# Patient Record
Sex: FEMALE | Race: WHITE | NOT HISPANIC OR LATINO | Employment: FULL TIME | URBAN - METROPOLITAN AREA
[De-identification: names, ages, dates, MRNs, and addresses within clinical notes are randomized per-mention and may not be internally consistent; named-entity substitution may affect disease eponyms.]

---

## 2024-07-15 ENCOUNTER — OFFICE VISIT (OUTPATIENT)
Dept: OBSTETRICS AND GYNECOLOGY | Facility: CLINIC | Age: 44
End: 2024-07-15
Attending: OBSTETRICS & GYNECOLOGY
Payer: OTHER GOVERNMENT

## 2024-07-15 DIAGNOSIS — N94.6 DYSMENORRHEA: Primary | ICD-10-CM

## 2024-07-15 PROCEDURE — 99213 OFFICE O/P EST LOW 20 MIN: CPT | Mod: 95,,, | Performed by: OBSTETRICS & GYNECOLOGY

## 2024-07-15 RX ORDER — IBUPROFEN 800 MG/1
800 TABLET ORAL 2 TIMES DAILY WITH MEALS
Qty: 30 TABLET | Refills: 2 | Status: SHIPPED | OUTPATIENT
Start: 2024-07-15 | End: 2024-10-16 | Stop reason: SDUPTHER

## 2024-07-15 RX ORDER — PROGESTERONE 200 MG/1
200 CAPSULE ORAL NIGHTLY
Qty: 15 CAPSULE | Refills: 11 | Status: ON HOLD | OUTPATIENT
Start: 2024-07-15 | End: 2024-10-23 | Stop reason: HOSPADM

## 2024-07-15 NOTE — PROGRESS NOTES
The patient location is: home  The chief complaint leading to consultation is: painful menses    Visit type: audiovisual    Face to Face time with patient: 20   total minutes of total time spent on the encounter, which includes face to face time and non-face to face time preparing to see the patient (eg, review of tests), Obtaining and/or reviewing separately obtained history, Documenting clinical information in the electronic or other health record, Independently interpreting results (not separately reported) and communicating results to the patient/family/caregiver, or Care coordination (not separately reported).         Each patient to whom he or she provides medical services by telemedicine is:  (1) informed of the relationship between the physician and patient and the respective role of any other health care provider with respect to management of the patient; and (2) notified that he or she may decline to receive medical services by telemedicine and may withdraw from such care at any time.    Notes:     CC:painful menses     HPI:  Pain last 2 weeks of cycle.  Taking nexium and famotidine.  Essentially taking ibuprofen x 2 weeks of the month daily.   History of associated diarrhea, usually resolves after cramping resolves.     ROS:  GENERAL: Feeling well overall. Denies fever or chills.   SKIN: Denies rash or lesions.   HEAD: Denies head injury or headache.   NODES: Denies enlarged lymph nodes.   CHEST: Denies chest pain or shortness of breath.   CARDIOVASCULAR: Denies palpitations or left sided chest pain.   ABDOMEN: No abdominal pain, constipation, diarrhea, nausea, vomiting or rectal bleeding.   URINARY: No dysuria, hematuria, or burning on urination.  REPRODUCTIVE: See HPI.   BREASTS: Denies pain, lumps, or nipple discharge.   HEMATOLOGIC: No easy bruisability or excessive bleeding.   MUSCULOSKELETAL: Denies joint pain or swelling.   NEUROLOGIC: Denies syncope or weakness.   PSYCHIATRIC: Denies depression,  anxiety or mood swings.    PE:   APPEARANCE: Well nourished, well developed, White female in no acute distress.      Diagnosis:  1. Dysmenorrhea        Plan:     Orders Placed This Encounter    progesterone (PROMETRIUM) 200 MG capsule    ibuprofen (ADVIL,MOTRIN) 800 MG tablet           Rima Vann DO

## 2024-10-04 ENCOUNTER — OFFICE VISIT (OUTPATIENT)
Dept: OBSTETRICS AND GYNECOLOGY | Facility: CLINIC | Age: 44
End: 2024-10-04
Attending: OBSTETRICS & GYNECOLOGY
Payer: OTHER GOVERNMENT

## 2024-10-04 VITALS
BODY MASS INDEX: 39.57 KG/M2 | DIASTOLIC BLOOD PRESSURE: 77 MMHG | WEIGHT: 237.81 LBS | SYSTOLIC BLOOD PRESSURE: 112 MMHG

## 2024-10-04 DIAGNOSIS — G47.33 OBSTRUCTIVE SLEEP APNEA OF ADULT: ICD-10-CM

## 2024-10-04 DIAGNOSIS — N94.6 DYSMENORRHEA: Primary | ICD-10-CM

## 2024-10-04 DIAGNOSIS — I10 HYPERTENSION, UNSPECIFIED TYPE: ICD-10-CM

## 2024-10-04 DIAGNOSIS — K21.9 GASTROESOPHAGEAL REFLUX DISEASE, UNSPECIFIED WHETHER ESOPHAGITIS PRESENT: ICD-10-CM

## 2024-10-04 PROCEDURE — 99214 OFFICE O/P EST MOD 30 MIN: CPT | Mod: PBBFAC,PO | Performed by: OBSTETRICS & GYNECOLOGY

## 2024-10-04 PROCEDURE — 99214 OFFICE O/P EST MOD 30 MIN: CPT | Mod: S$PBB,,, | Performed by: OBSTETRICS & GYNECOLOGY

## 2024-10-04 PROCEDURE — 99999 PR PBB SHADOW E&M-EST. PATIENT-LVL IV: CPT | Mod: PBBFAC,,, | Performed by: OBSTETRICS & GYNECOLOGY

## 2024-10-09 ENCOUNTER — PATIENT MESSAGE (OUTPATIENT)
Dept: PSYCHIATRY | Facility: CLINIC | Age: 44
End: 2024-10-09
Payer: OTHER GOVERNMENT

## 2024-10-10 ENCOUNTER — PATIENT MESSAGE (OUTPATIENT)
Dept: SLEEP MEDICINE | Facility: CLINIC | Age: 44
End: 2024-10-10
Payer: OTHER GOVERNMENT

## 2024-10-14 ENCOUNTER — HOSPITAL ENCOUNTER (OUTPATIENT)
Dept: PREADMISSION TESTING | Facility: OTHER | Age: 44
Discharge: HOME | End: 2024-10-14
Attending: OBSTETRICS & GYNECOLOGY
Payer: OTHER GOVERNMENT

## 2024-10-14 ENCOUNTER — ANESTHESIA EVENT (OUTPATIENT)
Dept: SURGERY | Facility: OTHER | Age: 44
End: 2024-10-14
Payer: OTHER GOVERNMENT

## 2024-10-14 VITALS
WEIGHT: 237 LBS | HEART RATE: 80 BPM | BODY MASS INDEX: 39.49 KG/M2 | RESPIRATION RATE: 16 BRPM | SYSTOLIC BLOOD PRESSURE: 117 MMHG | DIASTOLIC BLOOD PRESSURE: 55 MMHG | TEMPERATURE: 98 F | OXYGEN SATURATION: 97 % | HEIGHT: 65 IN

## 2024-10-14 DIAGNOSIS — N94.6 DYSMENORRHEA: ICD-10-CM

## 2024-10-14 LAB
ABO + RH BLD: NORMAL
BLD GP AB SCN CELLS X3 SERPL QL: NORMAL
SPECIMEN OUTDATE: NORMAL

## 2024-10-14 PROCEDURE — 86900 BLOOD TYPING SEROLOGIC ABO: CPT | Performed by: OBSTETRICS & GYNECOLOGY

## 2024-10-14 PROCEDURE — 86901 BLOOD TYPING SEROLOGIC RH(D): CPT | Performed by: OBSTETRICS & GYNECOLOGY

## 2024-10-14 RX ORDER — LIDOCAINE HYDROCHLORIDE 10 MG/ML
0.5 INJECTION, SOLUTION EPIDURAL; INFILTRATION; INTRACAUDAL; PERINEURAL ONCE
OUTPATIENT
Start: 2024-10-14 | End: 2024-10-14

## 2024-10-14 RX ORDER — SODIUM CHLORIDE, SODIUM LACTATE, POTASSIUM CHLORIDE, CALCIUM CHLORIDE 600; 310; 30; 20 MG/100ML; MG/100ML; MG/100ML; MG/100ML
INJECTION, SOLUTION INTRAVENOUS CONTINUOUS
OUTPATIENT
Start: 2024-10-14

## 2024-10-14 NOTE — ANESTHESIA PREPROCEDURE EVALUATION
10/14/2024  Shayna Melchor is a 44 y.o., female.      Pre-op Assessment    I have reviewed the Patient Summary Reports.     I have reviewed the Nursing Notes. I have reviewed the NPO Status.   I have reviewed the Medications.     Review of Systems  Anesthesia Hx:  No previous Anesthesia   History of prior surgery of interest to airway management or planning:          Denies Family Hx of Anesthesia complications.    Denies Personal Hx of Anesthesia complications.                    Social:  Non-Smoker       Hematology/Oncology:  Hematology Normal   Oncology Normal                                   EENT/Dental:  EENT/Dental Normal           Cardiovascular:     Hypertension, well controlled           hyperlipidemia   ECG has been reviewed. Recent stress test and echo WNL in epic                           Pulmonary:    Asthma mild   Sleep Apnea, CPAP                Renal/:  Renal/ Normal                 Hepatic/GI:     GERD                Musculoskeletal:  Arthritis               Neurological:      Headaches                                 Endocrine:  Endocrine Normal          Morbid Obesity / BMI > 40  Dermatological:  Skin Normal    Psych:  Psychiatric History  depression ADHD,PTSD               Physical Exam  General: Cooperative, Alert and Oriented    Airway:  Mallampati: II   Mouth Opening: Normal  Neck ROM: Normal ROM    Dental:  Intact    Anesthesia Plan  Type of Anesthesia, risks & benefits discussed:    Anesthesia Type: Gen ETT  Intra-op Monitoring Plan: Standard ASA Monitors  Post Op Pain Control Plan: multimodal analgesia  Induction:  IV  Airway Plan: Video  Informed Consent: Informed consent signed with the Patient and all parties understand the risks and agree with anesthesia plan.  All questions answered.   ASA Score: 3  Anesthesia Plan Notes: Labs in epic,T and s today    Ready For  Surgery From Anesthesia Perspective.     .

## 2024-10-14 NOTE — DISCHARGE INSTRUCTIONS
Information to Prepare you for your Surgery    PRE-ADMIT TESTING   [address]  [place] [address]  [address]   [phone]  - ORI    Your surgery has been scheduled at [facility]. We are pleased to have the opportunity to serve you. For Further Information please call [phone].    On the day of surgery please report to Registration on the 1st floor of the [facility].    CONTACT YOUR PHYSICIAN'S OFFICE THE DAY PRIOR TO YOUR SURGERY TO OBTAIN YOUR ARRIVAL TIME.     The evening before surgery do not eat anything after 9 p.m. ( this includes hard candy, chewing gum and mints).  You may only have GATORADE, POWERADE AND WATER  from 9 p.m. until you leave your home.   *DRINK AT LEAST 12 OUNCES THE MORNING OF SURGERY    DO NOT DRINK ANY LIQUIDS ON THE WAY TO THE HOSPITAL.      Why does your anesthesiologist allow you to drink Gatorade/Powerade before surgery?  Gatorade/Powerade helps to increase your comfort before surgery and to decrease your nausea after surgery.   The carbohydrates in Gatorade/Powerade help reduce your body's stress response to surgery.  If you are a diabetic-drink only water prior to surgery.    Outpatient Surgery- May allow 2 adults (18 and older)/ Support Persons (1 being the designated ) for all surgical/procedural patients. A breastfeeding mother will be allowed her infant and 2 adult Support Persons. No one under the age of 18 will be allowed in the building.    MEDICATION INSTRUCTIONS: TAKE medications checked off by the Anesthesiologist on your Medication List.  *Please bring blood pressure medication and diabetes medication in their original bottles the day of surgery.    Angiogram Patients: Take medications as instructed by your physician, including aspirin.     Surgery Patients:  If you take ASPIRIN - Your PHYSICIAN/SURGEON will need to inform you IF/OR when you need to stop taking aspirin prior to your surgery.      Starting the week prior to surgery, do not take any medications containing IBUPROFEN or NSAIDS (Advil, Aleve, BC, Celebrex, Goody's, Ketorolac, Meloxicam, Mobic, Motrin, Naproxen, Toradol, etc).  If you are not sure if you should take a medicine please call your surgeon's office.  You may take Tylenol.    Do Not Wear any make-up (especially eye make-up) to surgery. Please remove any false eyelashes or eyelash extensions. If you arrive the day of surgery with makeup/eyelashes on you will be required to remove prior to surgery. (There is a risk of corneal abrasions if eye makeup/eyelash extensions are not removed)    Leave all valuables at home.   Do Not wear any jewelry or watches, including any metal in body piercings. Jewelry must be removed prior to coming to the hospital.  There is a possibility that rings that are unable to be removed may be cut off if they are on the surgical extremity.    Please remove all hair extensions, wigs, clips and any other metal accessories/ ornaments from your hair.  These items may pose a flammable/fire risk in Surgery and must be removed.    Do not shave your surgical area at least 5 days prior to your surgery. The surgical prep will be performed at the hospital according to Infection Control regulations.    Contact Lens must be removed before surgery. Either do not wear the contact lens or bring a case and solution for storage.  Please bring a container for eyeglasses or dentures as required.  Bring any paperwork your physician has provided, such as consent forms,  history and physicals, doctor's orders, etc.   Bring comfortable clothes that are loose fitting to wear upon discharge. Take into consideration the type of surgery being performed.  Maintain your diet as advised per your physician the day prior to surgery.    Adequate rest the night before surgery is advised.   Park in the Parking lot behind the hospital or in the [employer] [facility] across the street from the  parking lot. Parking is complimentary.  If you will be discharged the same day as your procedure, please arrange for a responsible adult to drive you home or to accompany you if traveling by taxi.   YOU WILL NOT BE PERMITTED TO DRIVE OR TO LEAVE THE HOSPITAL ALONE AFTER SURGERY.   If you are being discharged the same day, it is strongly recommended that you arrange for someone to remain with you for the first 24 hrs following your surgery.    The Surgeon will speak to your family/visitor after your surgery regarding the outcome of your surgery and post op care.  The Surgeon may speak to you after your surgery, but there is a possibility you may not remember the details.  Please check with your family members regarding the conversation with the Surgeon.         Bathing Instructions with Hibiclens:    Shower the evening before and morning of your procedure with Chlorhexidine (Hibiclens)  do not use Chlorhexidine on your face or genitals. Do not get in your eyes.  Wash your face with water and your regular face wash/soap  Use your regular shampoo  Apply Chlorhexidine (Hibiclens) directly on your skin or on a wet washcloth and wash gently. When showering: Move away from the shower stream when applying Chlorhexidine (Hibiclens) to avoid rinsing off too soon.  Rinse thoroughly with warm water  Do not dilute Chlorhexidine (Hibiclens)   Dry off as usual, do not use any deodorant, powder, body lotions, perfume, after shave or cologne.     We strongly recommend whoever is bringing you home be present for discharge instructions.  This will ensure a thorough understanding for your post op home care.      ThanksJackie RN

## 2024-10-16 ENCOUNTER — PATIENT MESSAGE (OUTPATIENT)
Dept: OBSTETRICS AND GYNECOLOGY | Facility: CLINIC | Age: 44
End: 2024-10-16
Payer: OTHER GOVERNMENT

## 2024-10-16 DIAGNOSIS — N94.6 DYSMENORRHEA: ICD-10-CM

## 2024-10-16 RX ORDER — IBUPROFEN 800 MG/1
800 TABLET ORAL 2 TIMES DAILY WITH MEALS
Qty: 30 TABLET | Refills: 2 | Status: SHIPPED | OUTPATIENT
Start: 2024-10-16

## 2024-10-16 RX ORDER — OXYCODONE AND ACETAMINOPHEN 5; 325 MG/1; MG/1
1 TABLET ORAL EVERY 4 HOURS PRN
Qty: 20 TABLET | Refills: 0 | Status: SHIPPED | OUTPATIENT
Start: 2024-10-16

## 2024-10-18 ENCOUNTER — OFFICE VISIT (OUTPATIENT)
Dept: CARDIOLOGY | Facility: CLINIC | Age: 44
End: 2024-10-18
Payer: OTHER GOVERNMENT

## 2024-10-18 VITALS
WEIGHT: 242.31 LBS | HEIGHT: 65 IN | BODY MASS INDEX: 40.37 KG/M2 | SYSTOLIC BLOOD PRESSURE: 123 MMHG | DIASTOLIC BLOOD PRESSURE: 79 MMHG | HEART RATE: 75 BPM

## 2024-10-18 DIAGNOSIS — E66.813 CLASS 3 SEVERE OBESITY DUE TO EXCESS CALORIES WITH SERIOUS COMORBIDITY AND BODY MASS INDEX (BMI) OF 40.0 TO 44.9 IN ADULT: ICD-10-CM

## 2024-10-18 DIAGNOSIS — I10 PRIMARY HYPERTENSION: Primary | ICD-10-CM

## 2024-10-18 DIAGNOSIS — E78.2 MIXED HYPERLIPIDEMIA: ICD-10-CM

## 2024-10-18 DIAGNOSIS — E66.01 CLASS 3 SEVERE OBESITY DUE TO EXCESS CALORIES WITH SERIOUS COMORBIDITY AND BODY MASS INDEX (BMI) OF 40.0 TO 44.9 IN ADULT: ICD-10-CM

## 2024-10-18 PROCEDURE — 99214 OFFICE O/P EST MOD 30 MIN: CPT | Mod: PBBFAC | Performed by: INTERNAL MEDICINE

## 2024-10-18 PROCEDURE — 99999 PR PBB SHADOW E&M-EST. PATIENT-LVL IV: CPT | Mod: PBBFAC,,, | Performed by: INTERNAL MEDICINE

## 2024-10-18 RX ORDER — CARVEDILOL 6.25 MG/1
6.25 TABLET ORAL 2 TIMES DAILY WITH MEALS
Qty: 180 TABLET | Refills: 3 | Status: SHIPPED | OUTPATIENT
Start: 2024-10-18 | End: 2025-10-18

## 2024-10-18 NOTE — PROGRESS NOTES
HISTORY:    43 yo F w a h/o hypertension, hyperlipidemia, EHSAN on CPAP, obesity presenting for for 2nd visit with me.    Pt initially evaluated for DICKSON and elevated heart rates with activity over the previous few months. We adjusted her CV/anti-htnsive regimen with improvement in symptoms.    No CP or DICKSON at this time. Heart rates acceptable now.     Back to exercising at home and walking a lot.     The patient denies any previous history of myocardial infarction, coronary artery disease, peripheral arterial disease, stroke, congestive heart failure, or cardiomyopathy. Non-smoker.     Currently tolerates carvedilol 6.25x2, telmisartan 80x1. Bps controlled on current regimen.     Recently moved here from [place]. From [facility].     PHYSICAL EXAM:    Vitals:    10/18/24 1031   BP: 123/79   Pulse: 75       NAD, A+Ox3.  No jvd, no bruit.  RRR nml s1,s2. No murmurs.  CTA B no wheezes or crackles.  No edema.    LABS/STUDIES (imaging reviewed during clinic visit):    October 2024 CBC and CMP unremarkable.  August BNP and troponin normal.  February /HDL 38//.  A1c and TSH normal.    ECG August 2024 sinus rhythm with no Q-waves or ST changes.  Holter September 2024 sinus rhythm with average heart rate of 88.  Multiple episodes of dizziness or shortness of breath corresponded with sinus rhythm.  TST October 2024 6 minutes and 37 seconds on a high ramp protocol.  No evidence of ischemia.    TTE September 2024 normal LV size and function with EF 60 65%.  Normal diastology.  CVP 3.  Venous ultrasound March 2024 no evidence of DVT bilaterally.  Right GSV reflux present.        ASSESSMENT & PLAN:    1. Primary hypertension    2. Mixed hyperlipidemia    3. Class 3 severe obesity due to excess calories with serious comorbidity and body mass index (BMI) of 40.0 to 44.9 in adult          Orders Placed This Encounter    carvediloL (COREG) 6.25 MG tablet       DICKSON and elevated heart rates resolved.      Bps controlled on  current regimen of carvedilol 6.25x2 and telmisartan 80x1.     Mild venous insufficiency. Symptoms managed with compression stockings.     We discussed the importance of diet modifications and instituting an exercise regimen.     Has hysterectomy scheduled for next week. No CV hx. CV testing all normal of late. No CV contraindication to proceeding. No further CV testing required.     Follow up if symptoms worsen or fail to improve.      Tripp Barrera MD

## 2024-10-22 ENCOUNTER — TELEPHONE (OUTPATIENT)
Dept: OBSTETRICS AND GYNECOLOGY | Facility: CLINIC | Age: 44
End: 2024-10-22
Payer: OTHER GOVERNMENT

## 2024-10-22 PROBLEM — N94.6 DYSMENORRHEA: Status: ACTIVE | Noted: 2024-10-22

## 2024-10-22 NOTE — H&P
[facility])  Gynecology  H&P    Patient Name: Shayna Melchor  MRN: [id number]  Admission Date: (Not on file)  Primary Care Provider: Stefanie Baltazar MD   Principal Problem: Dysmenorrhea    Subjective:     Chief Complaint/Reason for Admission: encounter for hysterectomy    History of Present Illness: Shayna Melchor is a 43 yo patient who presents for hysterectomy due to long standing pelvic pain, painful periods and irregular spotting.  She has not seen improvement with NSAIDS or IUD. She would like to proceed with hysterectomy for definitive management.  HTN well controlled on medications.  Regular exercise with walking. Has received clearance from her PCP to proceed with surgery.      Past Medical History:   Diagnosis Date    ADHD (attention deficit hyperactivity disorder)     Depression     Hyperlipidemia     Hypertension     EHSAN (obstructive sleep apnea)     wears cpap    PTSD (post-traumatic stress disorder)      Past Surgical History:   Procedure Laterality Date     SECTION  2015, 2017    SINUS SURGERY  2009    deviated septum    TUBAL LIGATION  2017     Family History       Problem Relation (Age of Onset)    Arthritis Mother, Paternal Grandmother    Asthma Maternal Grandmother    Breast cancer Paternal Aunt, Paternal Aunt, Paternal Grandfather    Cancer Paternal Aunt, Paternal Grandmother    Depression Mother, Father, Sister, Maternal Grandmother, Paternal Grandmother, Brother    Diabetes Mother    Heart disease Paternal Grandfather    Hyperlipidemia Mother    Hypertension Mother    Learning disabilities Sister, Brother    Mental illness Mother, Father, Sister, Brother    Miscarriages / Stillbirths Paternal Grandmother    Vision loss Mother, Father, Paternal Grandmother          Tobacco Use    Smoking status: Never    Smokeless tobacco: Never   Substance and Sexual Activity    Alcohol use: Not Currently     Alcohol/week: 2.0 standard drinks of alcohol      Types: 2 Glasses of wine per week    Drug use: Never    Sexual activity: Yes     Partners: Male     Birth control/protection: I.U.D., See Surgical Hx       No medications prior to admission.       Review of patient's allergies indicates:   Allergen Reactions    Benadryl allergy-sinus Palpitations     tachycardia    Ciprofloxacin Hives, Shortness Of Breath and Other (See Comments)     hives    Diphenhydramine Shortness Of Breath, Palpitations and Other (See Comments)     Other Reaction(s): Other        Fish containing products Other (See Comments), Anaphylaxis, Hives and Shortness Of Breath     White fish    Any type of fish.    Latex, natural rubber Hives    Nickel Hives and Rash       Review of Systems   Constitutional: Negative.    HENT: Negative.     Eyes: Negative.    Respiratory:  Negative for shortness of breath.    Cardiovascular:         Mild venous insufficiency   Gastrointestinal:  Positive for abdominal pain, constipation and diarrhea.   Endocrine: Negative.    Genitourinary:  Positive for menorrhagia, menstrual problem, pelvic pain and vaginal bleeding.   Musculoskeletal: Negative.    Integumentary:  Negative.   Neurological: Negative.    Hematological: Negative.    Psychiatric/Behavioral:  Positive for depression.    Breast: negative.       Objective:     Vital Signs (Most Recent):    Vital Signs (24h Range):           There is no height or weight on file to calculate BMI.    Physical Exam:   Constitutional: She is oriented to person, place, and time. She appears well-developed and well-nourished.    HENT:   Head: Normocephalic and atraumatic.    Eyes: Pupils are equal, round, and reactive to light. EOM are normal.    Neck: No thyromegaly present.    Cardiovascular:       Exam reveals no clubbing and no cyanosis.       Cleared, normal studies, tachycardia improved.      Pulmonary/Chest: Effort normal.        Abdominal: Soft.     Genitourinary:    Vagina and uterus normal.             Musculoskeletal:  Normal range of motion.       Neurological: She is alert and oriented to person, place, and time.    Skin: Skin is warm and dry. No cyanosis. Nails show no clubbing.    Psychiatric: She has a normal mood and affect. Her behavior is normal. Judgment and thought content normal.       Laboratory:  Lab Results   Component Value Date    WBC 11.02 10/03/2024    HGB 12.0 10/03/2024    HCT 35.4 (L) 10/03/2024    MCV 96 10/03/2024     10/03/2024     CMP  Sodium   Date Value Ref Range Status   10/03/2024 139 136 - 145 mmol/L Final     Potassium   Date Value Ref Range Status   10/03/2024 4.2 3.5 - 5.1 mmol/L Final     Chloride   Date Value Ref Range Status   10/03/2024 107 95 - 110 mmol/L Final     CO2   Date Value Ref Range Status   10/03/2024 23 23 - 29 mmol/L Final     Glucose   Date Value Ref Range Status   10/03/2024 114 (H) 70 - 110 mg/dL Final     BUN   Date Value Ref Range Status   10/03/2024 12 6 - 20 mg/dL Final     Creatinine   Date Value Ref Range Status   10/03/2024 0.8 0.5 - 1.4 mg/dL Final     Calcium   Date Value Ref Range Status   10/03/2024 9.7 8.7 - 10.5 mg/dL Final     Total Protein   Date Value Ref Range Status   10/03/2024 7.3 6.0 - 8.4 g/dL Final     Albumin   Date Value Ref Range Status   10/03/2024 3.6 3.5 - 5.2 g/dL Final     Total Bilirubin   Date Value Ref Range Status   10/03/2024 0.4 0.1 - 1.0 mg/dL Final     Comment:     For infants and newborns, interpretation of results should be based  on gestational age, weight and in agreement with clinical  observations.    Premature Infant recommended reference ranges:  Up to 24 hours.............<8.0 mg/dL  Up to 48 hours............<12.0 mg/dL  3-5 days..................<15.0 mg/dL  6-29 days.................<15.0 mg/dL       Alkaline Phosphatase   Date Value Ref Range Status   10/03/2024 81 55 - 135 U/L Final     AST   Date Value Ref Range Status   10/03/2024 9 (L) 10 - 40 U/L Final     ALT   Date Value Ref Range Status   10/03/2024 28 10 - 44  U/L Final     Anion Gap   Date Value Ref Range Status   10/03/2024 9 8 - 16 mmol/L Final     eGFR   Date Value Ref Range Status   10/03/2024 >60.0 >60 mL/min/1.73 m^2 Final           Diagnostic Results:  Stress EKG:    The ECG portion of the study is negative for ischemia.    The patient reported no chest pain during the stress test.    The blood pressure response to stress was normal.    During stress, occasional PACs are noted.    The patient exercised for 6 minutes 37 seconds on a high ramp protocol, achieving a peak heart rate of 136 bpm, which is 80% of the age predicted maximum heart rate.    Baseline ECG: The Baseline ECG reveals sinus rhythm. There is low voltage.    The ECG portion of the study is negative for ischemia.    Stress ECG: There is no ST segment deviation identified during the protocol.      Pelvic u/s  EXAMINATION:  US PELVIS COMP WITH TRANSVAG NON-OB (XPD)     CLINICAL HISTORY:  Displacement of intrauterine contraceptive device, initial encounter     TECHNIQUE:  Transabdominal sonography of the pelvis was performed, followed by transvaginal sonography to better evaluate the uterus and ovaries.     COMPARISON:  None.     FINDINGS:  Uterus:     Size: 7.9 x 3.7 x 5.4 cm     The parenchyma is homogeneous.  Endometrium is normal caliber measuring 0.6 cm.     Right ovary:     Size: 3.7 x 2.6 x 2.2 cm.  Cyst with a reticular pattern of internal echoes measures 2.2 cm.     Left ovary:     Not visualized     Free Fluid:     None.     Impression:     Intrauterine device in good position in the endometrial canal.     Small right ovarian hemorrhagic cyst or follicle.  No follow-up required in this age group.     Assessment/Plan:     Active Diagnoses:    Diagnosis Date Noted POA    PRINCIPAL PROBLEM:  Dysmenorrhea [N94.6] 10/22/2024 Yes      Problems Resolved During this Admission:   Plan for hyst with ovarian preservation unless abnormalities present:  -consents signed and to chart  -pre-op clearance  obtained  -DVT ppx with SCDs    HTN: controlled of 2 medication regimen  -normal cardiac studies/ normal stress EKG  -prior tachycardia resolved    EHSAN:  Resume normal interventions on discharge to home    Rima Vann DO  Gynecology  [facility] ([place])

## 2024-10-22 NOTE — TELEPHONE ENCOUNTER
Pt called about surgery time tomorrow. Informed pt surgery is scheduled for noon, and to arrive at 10am. Pt LISA.

## 2024-10-23 ENCOUNTER — ANESTHESIA (OUTPATIENT)
Dept: SURGERY | Facility: OTHER | Age: 44
End: 2024-10-23
Payer: OTHER GOVERNMENT

## 2024-10-23 ENCOUNTER — HOSPITAL ENCOUNTER (OUTPATIENT)
Facility: OTHER | Age: 44
Discharge: HOME | End: 2024-10-24
Attending: OBSTETRICS & GYNECOLOGY | Admitting: OBSTETRICS & GYNECOLOGY
Payer: OTHER GOVERNMENT

## 2024-10-23 DIAGNOSIS — N94.6 DYSMENORRHEA: ICD-10-CM

## 2024-10-23 DIAGNOSIS — Z90.710 STATUS POST HYSTERECTOMY: Primary | ICD-10-CM

## 2024-10-23 LAB
B-HCG UR QL: NEGATIVE
CTP QC/QA: YES

## 2024-10-23 PROCEDURE — 25000003 PHARM REV CODE 250: Performed by: ANESTHESIOLOGY

## 2024-10-23 PROCEDURE — 36000711: Performed by: OBSTETRICS & GYNECOLOGY

## 2024-10-23 PROCEDURE — 88302 TISSUE EXAM BY PATHOLOGIST: CPT | Mod: 26,,, | Performed by: PATHOLOGY

## 2024-10-23 PROCEDURE — 25000003 PHARM REV CODE 250

## 2024-10-23 PROCEDURE — 11000001 HC ACUTE MED/SURG PRIVATE ROOM

## 2024-10-23 PROCEDURE — 58571 TLH W/T/O 250 G OR LESS: CPT | Mod: ,,, | Performed by: OBSTETRICS & GYNECOLOGY

## 2024-10-23 PROCEDURE — 63600175 PHARM REV CODE 636 W HCPCS: Performed by: NURSE ANESTHETIST, CERTIFIED REGISTERED

## 2024-10-23 PROCEDURE — 36000710: Performed by: OBSTETRICS & GYNECOLOGY

## 2024-10-23 PROCEDURE — 71000033 HC RECOVERY, INTIAL HOUR: Performed by: OBSTETRICS & GYNECOLOGY

## 2024-10-23 PROCEDURE — P9045 ALBUMIN (HUMAN), 5%, 250 ML: HCPCS | Mod: JZ,JG | Performed by: NURSE ANESTHETIST, CERTIFIED REGISTERED

## 2024-10-23 PROCEDURE — 25000003 PHARM REV CODE 250: Performed by: NURSE ANESTHETIST, CERTIFIED REGISTERED

## 2024-10-23 PROCEDURE — 88302 TISSUE EXAM BY PATHOLOGIST: CPT | Performed by: PATHOLOGY

## 2024-10-23 PROCEDURE — 37000008 HC ANESTHESIA 1ST 15 MINUTES: Performed by: OBSTETRICS & GYNECOLOGY

## 2024-10-23 PROCEDURE — 94799 UNLISTED PULMONARY SVC/PX: CPT

## 2024-10-23 PROCEDURE — 88300 SURGICAL PATH GROSS: CPT | Mod: 26,,, | Performed by: PATHOLOGY

## 2024-10-23 PROCEDURE — 63600175 PHARM REV CODE 636 W HCPCS: Mod: JZ,JG | Performed by: ANESTHESIOLOGY

## 2024-10-23 PROCEDURE — 63600175 PHARM REV CODE 636 W HCPCS: Performed by: ANESTHESIOLOGY

## 2024-10-23 PROCEDURE — 25000003 PHARM REV CODE 250: Performed by: OBSTETRICS & GYNECOLOGY

## 2024-10-23 PROCEDURE — 88307 TISSUE EXAM BY PATHOLOGIST: CPT | Mod: 26,,, | Performed by: PATHOLOGY

## 2024-10-23 PROCEDURE — 82962 GLUCOSE BLOOD TEST: CPT | Performed by: OBSTETRICS & GYNECOLOGY

## 2024-10-23 PROCEDURE — 63600175 PHARM REV CODE 636 W HCPCS: Performed by: OBSTETRICS & GYNECOLOGY

## 2024-10-23 PROCEDURE — 88307 TISSUE EXAM BY PATHOLOGIST: CPT | Performed by: PATHOLOGY

## 2024-10-23 PROCEDURE — 71000016 HC POSTOP RECOV ADDL HR: Performed by: OBSTETRICS & GYNECOLOGY

## 2024-10-23 PROCEDURE — 81025 URINE PREGNANCY TEST: CPT | Performed by: ANESTHESIOLOGY

## 2024-10-23 PROCEDURE — 71000015 HC POSTOP RECOV 1ST HR: Performed by: OBSTETRICS & GYNECOLOGY

## 2024-10-23 PROCEDURE — 37000009 HC ANESTHESIA EA ADD 15 MINS: Performed by: OBSTETRICS & GYNECOLOGY

## 2024-10-23 PROCEDURE — 27201423 OPTIME MED/SURG SUP & DEVICES STERILE SUPPLY: Performed by: OBSTETRICS & GYNECOLOGY

## 2024-10-23 PROCEDURE — 63600175 PHARM REV CODE 636 W HCPCS

## 2024-10-23 PROCEDURE — 71000039 HC RECOVERY, EACH ADD'L HOUR: Performed by: OBSTETRICS & GYNECOLOGY

## 2024-10-23 RX ORDER — KETAMINE HCL IN 0.9 % NACL 50 MG/5 ML
SYRINGE (ML) INTRAVENOUS
Status: DISCONTINUED | OUTPATIENT
Start: 2024-10-23 | End: 2024-10-23

## 2024-10-23 RX ORDER — PROCHLORPERAZINE EDISYLATE 5 MG/ML
5 INJECTION INTRAMUSCULAR; INTRAVENOUS EVERY 6 HOURS PRN
Status: DISCONTINUED | OUTPATIENT
Start: 2024-10-23 | End: 2024-10-24 | Stop reason: HOSPADM

## 2024-10-23 RX ORDER — SODIUM CHLORIDE, SODIUM LACTATE, POTASSIUM CHLORIDE, CALCIUM CHLORIDE 600; 310; 30; 20 MG/100ML; MG/100ML; MG/100ML; MG/100ML
INJECTION, SOLUTION INTRAVENOUS CONTINUOUS
Status: DISCONTINUED | OUTPATIENT
Start: 2024-10-23 | End: 2024-10-24

## 2024-10-23 RX ORDER — CEFAZOLIN SODIUM 1 G/3ML
2 INJECTION, POWDER, FOR SOLUTION INTRAMUSCULAR; INTRAVENOUS ONCE
Status: COMPLETED | OUTPATIENT
Start: 2024-10-23 | End: 2024-10-23

## 2024-10-23 RX ORDER — ALBUMIN HUMAN 50 G/1000ML
SOLUTION INTRAVENOUS
Status: DISCONTINUED | OUTPATIENT
Start: 2024-10-23 | End: 2024-10-23

## 2024-10-23 RX ORDER — HYDROMORPHONE HYDROCHLORIDE 1 MG/ML
0.4 INJECTION, SOLUTION INTRAMUSCULAR; INTRAVENOUS; SUBCUTANEOUS
Status: DISCONTINUED | OUTPATIENT
Start: 2024-10-23 | End: 2024-10-24 | Stop reason: HOSPADM

## 2024-10-23 RX ORDER — SODIUM CHLORIDE 0.9 % (FLUSH) 0.9 %
3 SYRINGE (ML) INJECTION
Status: DISCONTINUED | OUTPATIENT
Start: 2024-10-23 | End: 2024-10-23 | Stop reason: HOSPADM

## 2024-10-23 RX ORDER — CYCLOBENZAPRINE HCL 5 MG
5 TABLET ORAL ONCE
Status: COMPLETED | OUTPATIENT
Start: 2024-10-23 | End: 2024-10-23

## 2024-10-23 RX ORDER — CARVEDILOL 6.25 MG/1
6.25 TABLET ORAL 2 TIMES DAILY WITH MEALS
Status: DISCONTINUED | OUTPATIENT
Start: 2024-10-23 | End: 2024-10-24 | Stop reason: HOSPADM

## 2024-10-23 RX ORDER — DOCUSATE SODIUM 100 MG/1
100 CAPSULE, LIQUID FILLED ORAL 2 TIMES DAILY
Status: DISCONTINUED | OUTPATIENT
Start: 2024-10-23 | End: 2024-10-24 | Stop reason: HOSPADM

## 2024-10-23 RX ORDER — ALBUTEROL SULFATE 90 UG/1
2 INHALANT RESPIRATORY (INHALATION) EVERY 4 HOURS PRN
Status: DISCONTINUED | OUTPATIENT
Start: 2024-10-23 | End: 2024-10-24 | Stop reason: HOSPADM

## 2024-10-23 RX ORDER — HYDROMORPHONE HYDROCHLORIDE 2 MG/ML
0.4 INJECTION, SOLUTION INTRAMUSCULAR; INTRAVENOUS; SUBCUTANEOUS EVERY 5 MIN PRN
Status: DISCONTINUED | OUTPATIENT
Start: 2024-10-23 | End: 2024-10-23 | Stop reason: HOSPADM

## 2024-10-23 RX ORDER — KETOROLAC TROMETHAMINE 30 MG/ML
15 INJECTION, SOLUTION INTRAMUSCULAR; INTRAVENOUS EVERY 6 HOURS PRN
Status: DISCONTINUED | OUTPATIENT
Start: 2024-10-23 | End: 2024-10-24

## 2024-10-23 RX ORDER — FAMOTIDINE 20 MG/1
20 TABLET, FILM COATED ORAL
Status: COMPLETED | OUTPATIENT
Start: 2024-10-23 | End: 2024-10-23

## 2024-10-23 RX ORDER — KETOROLAC TROMETHAMINE 30 MG/ML
INJECTION, SOLUTION INTRAMUSCULAR; INTRAVENOUS
Status: DISCONTINUED | OUTPATIENT
Start: 2024-10-23 | End: 2024-10-23

## 2024-10-23 RX ORDER — DEXMEDETOMIDINE HYDROCHLORIDE 100 UG/ML
INJECTION, SOLUTION INTRAVENOUS
Status: DISCONTINUED | OUTPATIENT
Start: 2024-10-23 | End: 2024-10-23

## 2024-10-23 RX ORDER — LIDOCAINE HYDROCHLORIDE 10 MG/ML
0.5 INJECTION, SOLUTION EPIDURAL; INFILTRATION; INTRACAUDAL; PERINEURAL ONCE
Status: DISCONTINUED | OUTPATIENT
Start: 2024-10-23 | End: 2024-10-24

## 2024-10-23 RX ORDER — PHENYLEPHRINE HYDROCHLORIDE 10 MG/ML
INJECTION INTRAVENOUS
Status: DISCONTINUED | OUTPATIENT
Start: 2024-10-23 | End: 2024-10-23

## 2024-10-23 RX ORDER — MEPERIDINE HYDROCHLORIDE 25 MG/ML
12.5 INJECTION INTRAMUSCULAR; INTRAVENOUS; SUBCUTANEOUS ONCE AS NEEDED
Status: DISCONTINUED | OUTPATIENT
Start: 2024-10-23 | End: 2024-10-23 | Stop reason: HOSPADM

## 2024-10-23 RX ORDER — VILAZODONE HYDROCHLORIDE 20 MG/1
20 TABLET ORAL DAILY
Status: DISCONTINUED | OUTPATIENT
Start: 2024-10-24 | End: 2024-10-24 | Stop reason: HOSPADM

## 2024-10-23 RX ORDER — ACETAMINOPHEN 10 MG/ML
1000 INJECTION, SOLUTION INTRAVENOUS ONCE
Status: COMPLETED | OUTPATIENT
Start: 2024-10-23 | End: 2024-10-23

## 2024-10-23 RX ORDER — HYDROMORPHONE HYDROCHLORIDE 2 MG/ML
INJECTION, SOLUTION INTRAMUSCULAR; INTRAVENOUS; SUBCUTANEOUS
Status: DISCONTINUED | OUTPATIENT
Start: 2024-10-23 | End: 2024-10-23

## 2024-10-23 RX ORDER — LIDOCAINE HYDROCHLORIDE 20 MG/ML
INJECTION INTRAVENOUS
Status: DISCONTINUED | OUTPATIENT
Start: 2024-10-23 | End: 2024-10-23

## 2024-10-23 RX ORDER — FENTANYL CITRATE 50 UG/ML
INJECTION, SOLUTION INTRAMUSCULAR; INTRAVENOUS
Status: DISCONTINUED | OUTPATIENT
Start: 2024-10-23 | End: 2024-10-23

## 2024-10-23 RX ORDER — MIDAZOLAM HYDROCHLORIDE 1 MG/ML
INJECTION INTRAMUSCULAR; INTRAVENOUS
Status: DISCONTINUED | OUTPATIENT
Start: 2024-10-23 | End: 2024-10-23

## 2024-10-23 RX ORDER — BUSPIRONE HYDROCHLORIDE 5 MG/1
5 TABLET ORAL NIGHTLY
Status: DISCONTINUED | OUTPATIENT
Start: 2024-10-23 | End: 2024-10-24 | Stop reason: HOSPADM

## 2024-10-23 RX ORDER — GLUCAGON 1 MG
1 KIT INJECTION
Status: DISCONTINUED | OUTPATIENT
Start: 2024-10-23 | End: 2024-10-23 | Stop reason: HOSPADM

## 2024-10-23 RX ORDER — OXYCODONE HYDROCHLORIDE 10 MG/1
10 TABLET ORAL EVERY 4 HOURS PRN
Status: DISCONTINUED | OUTPATIENT
Start: 2024-10-23 | End: 2024-10-24 | Stop reason: HOSPADM

## 2024-10-23 RX ORDER — HYDROMORPHONE HYDROCHLORIDE 2 MG/ML
0.2 INJECTION, SOLUTION INTRAMUSCULAR; INTRAVENOUS; SUBCUTANEOUS
Status: DISCONTINUED | OUTPATIENT
Start: 2024-10-23 | End: 2024-10-24

## 2024-10-23 RX ORDER — SIMETHICONE 80 MG
1 TABLET,CHEWABLE ORAL 3 TIMES DAILY PRN
Status: CANCELLED | OUTPATIENT
Start: 2024-10-23

## 2024-10-23 RX ORDER — OXYCODONE HYDROCHLORIDE 5 MG/1
5 TABLET ORAL EVERY 4 HOURS PRN
Status: DISCONTINUED | OUTPATIENT
Start: 2024-10-23 | End: 2024-10-24

## 2024-10-23 RX ORDER — VECURONIUM BROMIDE 1 MG/ML
INJECTION, POWDER, LYOPHILIZED, FOR SOLUTION INTRAVENOUS
Status: DISCONTINUED | OUTPATIENT
Start: 2024-10-23 | End: 2024-10-23

## 2024-10-23 RX ORDER — MONTELUKAST SODIUM 10 MG/1
10 TABLET ORAL NIGHTLY
Status: DISCONTINUED | OUTPATIENT
Start: 2024-10-23 | End: 2024-10-24 | Stop reason: HOSPADM

## 2024-10-23 RX ORDER — IBUPROFEN 400 MG/1
800 TABLET ORAL EVERY 6 HOURS
Status: DISCONTINUED | OUTPATIENT
Start: 2024-10-25 | End: 2024-10-24 | Stop reason: HOSPADM

## 2024-10-23 RX ORDER — KETOROLAC TROMETHAMINE 30 MG/ML
15 INJECTION, SOLUTION INTRAMUSCULAR; INTRAVENOUS EVERY 6 HOURS
Status: DISCONTINUED | OUTPATIENT
Start: 2024-10-24 | End: 2024-10-24 | Stop reason: HOSPADM

## 2024-10-23 RX ORDER — SODIUM CHLORIDE 9 MG/ML
INJECTION, SOLUTION INTRAVENOUS CONTINUOUS
Status: DISCONTINUED | OUTPATIENT
Start: 2024-10-23 | End: 2024-10-23

## 2024-10-23 RX ORDER — PROPOFOL 10 MG/ML
VIAL (ML) INTRAVENOUS
Status: DISCONTINUED | OUTPATIENT
Start: 2024-10-23 | End: 2024-10-23

## 2024-10-23 RX ORDER — OXYCODONE HYDROCHLORIDE 5 MG/1
5 TABLET ORAL ONCE
Status: COMPLETED | OUTPATIENT
Start: 2024-10-23 | End: 2024-10-23

## 2024-10-23 RX ORDER — ONDANSETRON HYDROCHLORIDE 2 MG/ML
INJECTION, SOLUTION INTRAMUSCULAR; INTRAVENOUS
Status: DISCONTINUED | OUTPATIENT
Start: 2024-10-23 | End: 2024-10-23

## 2024-10-23 RX ORDER — TALC
6 POWDER (GRAM) TOPICAL NIGHTLY PRN
Status: DISCONTINUED | OUTPATIENT
Start: 2024-10-23 | End: 2024-10-24 | Stop reason: HOSPADM

## 2024-10-23 RX ORDER — ACETAMINOPHEN 500 MG
1000 TABLET ORAL
Status: COMPLETED | OUTPATIENT
Start: 2024-10-23 | End: 2024-10-23

## 2024-10-23 RX ORDER — OXYCODONE HYDROCHLORIDE 5 MG/1
5 TABLET ORAL
Status: DISCONTINUED | OUTPATIENT
Start: 2024-10-23 | End: 2024-10-23 | Stop reason: HOSPADM

## 2024-10-23 RX ORDER — MUPIROCIN 20 MG/G
OINTMENT TOPICAL
Status: DISCONTINUED | OUTPATIENT
Start: 2024-10-23 | End: 2024-10-23

## 2024-10-23 RX ORDER — PROCHLORPERAZINE EDISYLATE 5 MG/ML
5 INJECTION INTRAMUSCULAR; INTRAVENOUS EVERY 30 MIN PRN
Status: DISCONTINUED | OUTPATIENT
Start: 2024-10-23 | End: 2024-10-23 | Stop reason: HOSPADM

## 2024-10-23 RX ORDER — CLONAZEPAM 0.5 MG/1
1 TABLET ORAL NIGHTLY
Status: DISCONTINUED | OUTPATIENT
Start: 2024-10-23 | End: 2024-10-24 | Stop reason: HOSPADM

## 2024-10-23 RX ORDER — OXYBUTYNIN CHLORIDE 5 MG/1
5 TABLET ORAL 3 TIMES DAILY
Status: CANCELLED | OUTPATIENT
Start: 2024-10-23

## 2024-10-23 RX ORDER — MUPIROCIN 20 MG/G
OINTMENT TOPICAL 2 TIMES DAILY
Status: DISCONTINUED | OUTPATIENT
Start: 2024-10-23 | End: 2024-10-24 | Stop reason: HOSPADM

## 2024-10-23 RX ORDER — ONDANSETRON HYDROCHLORIDE 2 MG/ML
8 INJECTION, SOLUTION INTRAVENOUS EVERY 6 HOURS PRN
Status: DISCONTINUED | OUTPATIENT
Start: 2024-10-23 | End: 2024-10-24 | Stop reason: HOSPADM

## 2024-10-23 RX ORDER — DEXAMETHASONE SODIUM PHOSPHATE 4 MG/ML
INJECTION, SOLUTION INTRA-ARTICULAR; INTRALESIONAL; INTRAMUSCULAR; INTRAVENOUS; SOFT TISSUE
Status: DISCONTINUED | OUTPATIENT
Start: 2024-10-23 | End: 2024-10-23

## 2024-10-23 RX ORDER — OXYCODONE HYDROCHLORIDE 5 MG/1
5 TABLET ORAL EVERY 4 HOURS PRN
Status: DISCONTINUED | OUTPATIENT
Start: 2024-10-23 | End: 2024-10-24 | Stop reason: HOSPADM

## 2024-10-23 RX ORDER — ROCURONIUM BROMIDE 10 MG/ML
INJECTION, SOLUTION INTRAVENOUS
Status: DISCONTINUED | OUTPATIENT
Start: 2024-10-23 | End: 2024-10-23

## 2024-10-23 RX ORDER — BUSPIRONE HYDROCHLORIDE 5 MG/1
5 TABLET ORAL DAILY
Status: DISCONTINUED | OUTPATIENT
Start: 2024-10-24 | End: 2024-10-23

## 2024-10-23 RX ORDER — VALSARTAN 80 MG/1
320 TABLET ORAL DAILY
Status: DISCONTINUED | OUTPATIENT
Start: 2024-10-24 | End: 2024-10-24 | Stop reason: HOSPADM

## 2024-10-23 RX ORDER — ACETAMINOPHEN 500 MG
1000 TABLET ORAL EVERY 6 HOURS PRN
Status: DISCONTINUED | OUTPATIENT
Start: 2024-10-23 | End: 2024-10-24

## 2024-10-23 RX ORDER — ACETAMINOPHEN 500 MG
1000 TABLET ORAL EVERY 6 HOURS
Status: DISCONTINUED | OUTPATIENT
Start: 2024-10-24 | End: 2024-10-24 | Stop reason: HOSPADM

## 2024-10-23 RX ORDER — ONDANSETRON HYDROCHLORIDE 2 MG/ML
4 INJECTION, SOLUTION INTRAVENOUS EVERY 4 HOURS PRN
Status: DISCONTINUED | OUTPATIENT
Start: 2024-10-23 | End: 2024-10-24

## 2024-10-23 RX ORDER — NALOXONE HCL 0.4 MG/ML
0.02 VIAL (ML) INJECTION
Status: DISCONTINUED | OUTPATIENT
Start: 2024-10-23 | End: 2024-10-24 | Stop reason: HOSPADM

## 2024-10-23 RX ADMIN — FENTANYL CITRATE 100 MCG: 50 INJECTION, SOLUTION INTRAMUSCULAR; INTRAVENOUS at 12:10

## 2024-10-23 RX ADMIN — CYCLOBENZAPRINE HYDROCHLORIDE 5 MG: 5 TABLET, FILM COATED ORAL at 05:10

## 2024-10-23 RX ADMIN — ROCURONIUM BROMIDE 50 MG: 10 SOLUTION INTRAVENOUS at 12:10

## 2024-10-23 RX ADMIN — DEXMEDETOMIDINE HYDROCHLORIDE 4 MCG: 100 INJECTION, SOLUTION, CONCENTRATE INTRAVENOUS at 12:10

## 2024-10-23 RX ADMIN — OXYCODONE HYDROCHLORIDE 5 MG: 5 TABLET ORAL at 06:10

## 2024-10-23 RX ADMIN — ALBUMIN (HUMAN) 100 ML: 12.5 SOLUTION INTRAVENOUS at 02:10

## 2024-10-23 RX ADMIN — KETOROLAC TROMETHAMINE 15 MG: 30 INJECTION, SOLUTION INTRAMUSCULAR; INTRAVENOUS at 11:10

## 2024-10-23 RX ADMIN — MUPIROCIN: 20 OINTMENT TOPICAL at 11:10

## 2024-10-23 RX ADMIN — MIDAZOLAM HYDROCHLORIDE 2 MG: 1 INJECTION, SOLUTION INTRAMUSCULAR; INTRAVENOUS at 12:10

## 2024-10-23 RX ADMIN — ALBUMIN (HUMAN) 50 ML: 12.5 SOLUTION INTRAVENOUS at 02:10

## 2024-10-23 RX ADMIN — ALBUMIN (HUMAN) 50 ML: 12.5 SOLUTION INTRAVENOUS at 01:10

## 2024-10-23 RX ADMIN — PHENYLEPHRINE HYDROCHLORIDE 100 MCG: 10 INJECTION INTRAVENOUS at 01:10

## 2024-10-23 RX ADMIN — BUSPIRONE HYDROCHLORIDE 5 MG: 5 TABLET ORAL at 09:10

## 2024-10-23 RX ADMIN — ALBUMIN (HUMAN) 100 ML: 12.5 SOLUTION INTRAVENOUS at 01:10

## 2024-10-23 RX ADMIN — DEXMEDETOMIDINE HYDROCHLORIDE 4 MCG: 100 INJECTION, SOLUTION, CONCENTRATE INTRAVENOUS at 02:10

## 2024-10-23 RX ADMIN — ACETAMINOPHEN 1000 MG: 500 TABLET, FILM COATED ORAL at 11:10

## 2024-10-23 RX ADMIN — HYDROMORPHONE HYDROCHLORIDE 0.4 MG: 2 INJECTION, SOLUTION INTRAMUSCULAR; INTRAVENOUS; SUBCUTANEOUS at 04:10

## 2024-10-23 RX ADMIN — HYDROMORPHONE HYDROCHLORIDE 0.4 MG: 2 INJECTION INTRAMUSCULAR; INTRAVENOUS; SUBCUTANEOUS at 03:10

## 2024-10-23 RX ADMIN — OXYCODONE HYDROCHLORIDE 5 MG: 5 TABLET ORAL at 03:10

## 2024-10-23 RX ADMIN — SODIUM CHLORIDE, SODIUM LACTATE, POTASSIUM CHLORIDE, AND CALCIUM CHLORIDE: .6; .31; .03; .02 INJECTION, SOLUTION INTRAVENOUS at 12:10

## 2024-10-23 RX ADMIN — CEFAZOLIN 2 G: 330 INJECTION, POWDER, FOR SOLUTION INTRAMUSCULAR; INTRAVENOUS at 12:10

## 2024-10-23 RX ADMIN — SUGAMMADEX 400 MG: 100 INJECTION, SOLUTION INTRAVENOUS at 03:10

## 2024-10-23 RX ADMIN — ACETAMINOPHEN 1000 MG: 10 INJECTION INTRAVENOUS at 04:10

## 2024-10-23 RX ADMIN — MONTELUKAST 10 MG: 10 TABLET, FILM COATED ORAL at 09:10

## 2024-10-23 RX ADMIN — HYDROMORPHONE HYDROCHLORIDE 0.4 MG: 2 INJECTION, SOLUTION INTRAMUSCULAR; INTRAVENOUS; SUBCUTANEOUS at 03:10

## 2024-10-23 RX ADMIN — CLONAZEPAM 1 MG: 0.5 TABLET ORAL at 09:10

## 2024-10-23 RX ADMIN — PHENYLEPHRINE HYDROCHLORIDE 100 MCG: 10 INJECTION INTRAVENOUS at 12:10

## 2024-10-23 RX ADMIN — Medication 10 MG: at 02:10

## 2024-10-23 RX ADMIN — VECURONIUM BROMIDE FOR INJECTION 3 MG: 1 INJECTION, POWDER, LYOPHILIZED, FOR SOLUTION INTRAVENOUS at 02:10

## 2024-10-23 RX ADMIN — ONDANSETRON 4 MG: 2 INJECTION INTRAMUSCULAR; INTRAVENOUS at 02:10

## 2024-10-23 RX ADMIN — CARVEDILOL 6.25 MG: 6.25 TABLET, FILM COATED ORAL at 09:10

## 2024-10-23 RX ADMIN — HYDROMORPHONE HYDROCHLORIDE 0.6 MG: 2 INJECTION INTRAMUSCULAR; INTRAVENOUS; SUBCUTANEOUS at 03:10

## 2024-10-23 RX ADMIN — KETOROLAC TROMETHAMINE 30 MG: 30 INJECTION, SOLUTION INTRAMUSCULAR; INTRAVENOUS at 03:10

## 2024-10-23 RX ADMIN — LIDOCAINE HYDROCHLORIDE 75 MG: 20 INJECTION, SOLUTION INTRAVENOUS at 12:10

## 2024-10-23 RX ADMIN — DOCUSATE SODIUM 100 MG: 100 CAPSULE, LIQUID FILLED ORAL at 09:10

## 2024-10-23 RX ADMIN — VECURONIUM BROMIDE FOR INJECTION 4 MG: 1 INJECTION, POWDER, LYOPHILIZED, FOR SOLUTION INTRAVENOUS at 01:10

## 2024-10-23 RX ADMIN — VECURONIUM BROMIDE FOR INJECTION 3 MG: 1 INJECTION, POWDER, LYOPHILIZED, FOR SOLUTION INTRAVENOUS at 01:10

## 2024-10-23 RX ADMIN — DEXMEDETOMIDINE HYDROCHLORIDE 4 MCG: 100 INJECTION, SOLUTION, CONCENTRATE INTRAVENOUS at 01:10

## 2024-10-23 RX ADMIN — Medication 30 MG: at 12:10

## 2024-10-23 RX ADMIN — MUPIROCIN: 20 OINTMENT TOPICAL at 09:10

## 2024-10-23 RX ADMIN — PROPOFOL 170 MG: 10 INJECTION, EMULSION INTRAVENOUS at 12:10

## 2024-10-23 RX ADMIN — FAMOTIDINE 20 MG: 20 TABLET, FILM COATED ORAL at 11:10

## 2024-10-23 RX ADMIN — GLYCOPYRROLATE 0.2 MG: 0.2 INJECTION, SOLUTION INTRAMUSCULAR; INTRAVITREAL at 12:10

## 2024-10-23 RX ADMIN — DEXAMETHASONE SODIUM PHOSPHATE 8 MG: 4 INJECTION, SOLUTION INTRAMUSCULAR; INTRAVENOUS at 12:10

## 2024-10-23 RX ADMIN — CYCLOBENZAPRINE HYDROCHLORIDE 5 MG: 5 TABLET, FILM COATED ORAL at 06:10

## 2024-10-23 RX ADMIN — HYDROMORPHONE HYDROCHLORIDE 0.4 MG: 0.5 INJECTION, SOLUTION INTRAMUSCULAR; INTRAVENOUS; SUBCUTANEOUS at 09:10

## 2024-10-23 NOTE — DISCHARGE SUMMARY
"Discharge Summary  Gynecology      Admit Date: 10/23/2024    Discharge Date and Time: 10/24/2024     Attending Physician: No att. providers found    Principal Diagnoses: Status post hysterectomy    Active Hospital Problems    Diagnosis  POA    *S/P TLH/BS [Z90.710]  No    Dysmenorrhea [N94.6]  Yes    Asthma in adult, mild intermittent, uncomplicated [J45.20]  Yes    Obstructive sleep apnea of adult [G47.33]  Yes    Major depressive disorder, recurrent, mild [F33.0]  Yes    Class 3 severe obesity due to excess calories with serious comorbidity and body mass index (BMI) of 40.0 to 44.9 in adult [E66.813, E66.01, Z68.41]  Not Applicable      Resolved Hospital Problems   No resolved problems to display.       Procedures: Procedure(s) (LRB):  HYSTERECTOMY, TOTAL, LAPAROSCOPIC (N/A)  SALPINGECTOMY, LAPAROSCOPIC (Right)    Discharged Condition: good    Hospital Course:   Shayna Melchor is a 44 y.o. y.o.  female who presented on 10/23/2024 for above procedures for the treatment of AUB and dysmenorrhea. Patient tolerated procedure. Please see op note for more details. Post-operative course was complicated by acute post-operative pain. Patient  was admitted for scheduled pain medicine. On POD#1 pain was well controlled with PO pain medication.   On day of discharge, patient was urinating, ambulating, and tolerating a regular diet without difficulty. Pain was well controlled on PO medication. She was discharged home on POD#1 in stable condition with instructions to follow up with Dr. Vann in 4-6 weeks.     Consults: None    Significant Diagnostic Studies:  No results for input(s): "WBC", "HGB", "HCT", "MCV", "PLT" in the last 168 hours.     Treatments:   1. Surgery as above    Disposition: Home or Self Care    Patient Instructions:   Discharge Medication List as of 10/24/2024 10:49 AM        CONTINUE these medications which have NOT CHANGED    Details   azelastine (ASTELIN) 137 mcg (0.1 %) nasal spray 2 sprays " (274 mcg total) by Nasal route 2 (two) times daily., Starting Fri 3/8/2024, Until Sat 3/8/2025, Normal      busPIRone (BUSPAR) 5 MG Tab Take 1 tablet (5 mg total) by mouth once daily., Starting Fri 8/9/2024, Until Thu 11/7/2024, Normal      carvediloL (COREG) 6.25 MG tablet Take 1 tablet (6.25 mg total) by mouth 2 (two) times daily with meals., Starting Fri 10/18/2024, Until Sat 10/18/2025, Normal      clonazePAM (KLONOPIN) 1 MG tablet Take 1 tablet (1 mg total) by mouth every evening., Starting Wed 8/28/2024, Normal      esomeprazole (NEXIUM) 40 MG capsule Take 40 mg by mouth before breakfast., Historical Med      famotidine (PEPCID) 20 MG tablet Take 1 tablet (20 mg total) by mouth every evening., Starting Wed 6/5/2024, Until Sat 5/31/2025, Normal      hyoscyamine (LEVSIN) 0.125 mg Subl Historical Med      montelukast (SINGULAIR) 10 mg tablet Take 10 mg by mouth every evening., Historical Med      omega-3s/dha/epa/algal oil (MEGARED ADVANCED OMEGA-3 ALGAE ORAL) Algae based omega 3.  IWi one po daily., Historical Med      ondansetron (ZOFRAN) 8 MG tablet TK 1 T PO BID PRN, Historical Med      telmisartan (MICARDIS) 80 MG Tab Take 1 tablet (80 mg total) by mouth once daily., Starting Tue 8/13/2024, Normal      vilazodone (VIIBRYD) 20 mg Tab Take 1 tablet (20 mg total) by mouth once daily., Starting Fri 8/9/2024, Until Thu 11/7/2024, Normal      albuterol (PROAIR HFA) 90 mcg/actuation inhaler Historical Med      EPINEPHrine (EPIPEN 2-TRINITY) 0.3 mg/0.3 mL AtIn Inject 0.3 mLs (0.3 mg total) into the muscle once. for 1 dose, Starting Fri 3/8/2024, Normal      ibuprofen (ADVIL,MOTRIN) 800 MG tablet Take 1 tablet (800 mg total) by mouth 2 (two) times daily with meals., Starting Wed 10/16/2024, Normal      oxyCODONE-acetaminophen (PERCOCET) 5-325 mg per tablet Take 1 tablet by mouth every 4 (four) hours as needed for Pain., Starting Wed 10/16/2024, Normal           STOP taking these medications       progesterone  (PROMETRIUM) 200 MG capsule Comments:   Reason for Stopping:         levonorgestreL (KYLEENA) 17.5 mcg/24 hrs (5 yrs) 19.5 mg IUD Comments:   Reason for Stopping:               Discharge Procedure Orders   Diet general     Lifting restrictions   Order Comments: No lifting greater than 15 pounds for six weeks.     Other restrictions (specify):   Order Comments: PELVIC REST:  No douching, tampons, or intercourse for 6 weeks.    If prescribed, vaginal estrogen cream may be used during the postoperative period.     DRIVING:  No driving while on narcotics. Driving may be resumed initially with a competent passenger one to two weeks after surgery if no longer taking narcotics.     EXERCISE:  For six weeks your exercise should be limited to walking. You may walk as far as you wish, as long as you increase your level of exertion gradually and avoid slippery surfaces. You may climb stairs as needed to get around, but should not use stair climbing for exercise.     Remove dressing in 24 hours   Order Comments: If you have a bandage on wound, you may remove it the day after dismissal.  If you had steri-strips remove them once they begin to peel off (usually 2 weeks). Keep incision clean and dry.  Inspect the incision daily for signs and symptoms of infection.     Wound care routine (specify)   Order Comments: WOUND CARE:  If you have a band-aid or bandage on your wound, you may remove it the day after dismissal.  If you had steri-strips remove them once they begin to peel off (usually 2 weeks).  If your steri-strips still haven't come off in 2 weeks, please remove them. You may wash the wound with mild soap and water.   You may shower at any time but should avoid immersing any abdominal incisions in water for at least two weeks after surgery or until the wound is completely healed. If given, please shower with Hibiclens soap until bottle is completely finished. Keep your wound clean and dry.  You should observe your incision  for signs of infection which include redness, warmth, drainage or fever.     Call MD for:  temperature >100.4     Call MD for:  persistent nausea and vomiting     Call MD for:  severe uncontrolled pain     Call MD for:  difficulty breathing, headache or visual disturbances     Call MD for:  redness, tenderness, or signs of infection (pain, swelling, redness, odor or green/yellow discharge around incision site)     Call MD for:  hives     Call MD for:   Order Comments: inability to void,urine is ketchup colored or you have large clots, vaginal bleeding is heavier than a period.    VAGINAL DISCHARGE: You may develop a vaginal discharge and intermittent vaginal spotting after surgery and up to 6 weeks postoperatively.  The discharge may have an odor and may change in color but it is normal.  This is due to dissolving stiches.  Contact your surgical team if you develop vaginal or vulvar irritation along with a discharge.  Also contact your surgical team if you have vaginal discharge that smells like urine or stool.    CONSTIPATION REMEDIES: Patients are often constipated after surgery or with use of oral narcotic medicine. You should continue to take the stool softener, Senokot-S during the next six weeks, and consume adequate amounts of water.  If you have not had a bowel movement for 3 days after dismissal, or are uncomfortable and unable to pass stool, please try one or all of the following measures:  1.  Milk of Magnesia - 30 cc by mouth every 12 hours   2.  Dulcolax suppository - One suppository per rectum every 4-6 hours   3.  Metamucil, Fibercon or other bulk former - use as directed  4.  Fleets Enema        PAIN MEDICATIONS:     Take your pain medications as instructed. It is best to take pain medications before your pain becomes severe. This will allow you to take less medication yet have better pain relief. For the first 2 or 3 days it may be helpful to take your pain medications on a regular schedule (e.g.  every 4 to 6 hours). This will help you to keep your pain under better control. You should then begin to take fewer medications each day until you no longer need them. Do not take pain medication on an empty stomach. This may lead to nausea and vomiting.     Activity as tolerated   Order Comments: Return to normal activity slowly as you feel able.  For 6 weeks your exercise should be limited to walking.  You may walk as far as you wish, as long as you increase your level of exertion gradually and avoid slippery surfaces.    If you had a hysterectomy at the surgery do not insert anything in your vagina for 9 weeks.     Shower on day dressing removed (No bath)   Order Comments: You may shower at any time but should avoid immersing any abdominal incisions in water for at least 2 weeks after surgery or until the wound is completely healed.  If given, please shower with Hibaclens soap until bottle is completely finish        Follow-up Information       Rima Vann, DO Follow up in 4 week(s).    Specialty: Obstetrics and Gynecology  Why: Please attend post-operative visit with Dr. Vann in 4-6 weeks  Contact information:  [address] [id number] [phone]                             Luly Cui MD PGY-3  Obstetrics and Gynecology  [facility]

## 2024-10-23 NOTE — OP NOTE
OPERATIVE REPORT     DATE: 10/23/2024       PREOPERATIVE DIAGNOSIS  1. AUB   2. Dysmenorrhea       POSTOPERATIVE DIAGNOSIS  S/p TLH/RS    PROCEDURE:  1. Total Laparoscopic Hysterectomy  2. Right salpingectomy     SURGEON: Rima Vann DO     ASSISTANTS: Luly Cui MD - PGY3     ANESTHESIA: General     COMPLICATIONS: None     EBL: 40 cc     IV FLUIDS: See anesthesia report     URINE OUTPUT: per anesthesia      FINDINGS:   normal uterus, tubal segments bilaterally, minimal scarring of bladder to uterus, easily taken down. Left ovarian simple cyst, drained. Left fallopian segment with scarring to siodewall and IP so could not be removed safely, right tubal segment removed easily.     PROCEDURE IN DETAIL:  Patient was taken to the operating room where general anesthesia was administered and found to be adequate. She was placed in the dorsal lithotomy position using Mando stirrups and prepped and draped in the usual sterile fashion. A surgical timeout was performed with patient's name, date of birth, allergies, and procedure to be performed verbalized. All OR staff were in agreement. Preoperative antibiotics ancef 2g were administered.    Attention was then turned to the vagina. Abreu was placed. Twp right angle retractors were placed in the vagina. The anterior lip of the cervix was grasped with a single tooth tenaculum. The uterus was sounded to approximately 8 cm. A stay suture of 0-Vicryl was placed at 12 o'clock and 6 o'clock on the cervix. A AARON manipulator was placed inside the uterine cavity, and vagina was occluded.      Gloves were changed. A Veress needle was inserted into the umbilicus under tenting of the anterior abdominal wall. Placement into the peritoneal cavity was confirmed via saline drop test. The abdomen was insufflated to 15mm Hg using Carbon dioxide. A 10 mm infraumbilical skin incision was made with the scalpel. A 10 mm trocar was advanced through this incision. Good hemostasis was  noted. The patient was placed in deep Trendelenburg. An 5 mm left lateral skin incision was made with a scalpel and a 5 mm trocar was advanced through this incision under visualization of the camera. A 5 mm right lateral skin incision was made with the scalpel. A 5 mm trocar was advanced through this incision under visualization of the camera. Good hemostasis was noted. Survey of the abdomen and pelvis revealed findings as described above.     Attention was turned to the right round ligament. This was cauterized and transected and dissection was continued anteriorly to create the bladder flap to the midline. The left ureter was identified and noted to be well out of the operative field. The left uteroovarian ligament was then identified, doubly cauterized using the Ligasure, and transected. The posterior leaf of the left broad ligament was serially cauterized and transected and carried down to the level of the uterine vessels. Attention was turned to the right round ligament. It was cauterized and transected and continued anteriorly to finish creating the bladder flap. Bladder was then dissected off anterior uterine wall with blunt traction and cautery. The right ureter was identified and noted to be well out of the operative field. . Good hemostasis was noted. The right uteroovarian ligament was then identified, doubly cauterized using the Ligasure, and transected. The posterior leaf of the right broad ligament was serially cauterized and transected and carried down to the level of the uterine vessels. The uterine vessels were cauterized and transected bilaterally. Posterior colpotomy was created and taken out  anteriorly. Colpotomy was finished releasing uterus.     The uterus and cervix were removed vaginally. A moistened laparotomy sponge within a glove was placed in the vagina for reocclusion. The vaginal cuff was then closed with interrupted sutures of 0 vicryl using the Endostitch. Good hemostasis was noted.  The pelvis was suctioned. Good hemostasis was again noted.    Attention then turned to left fallopian tube remnant. Left fallopian segment was scarred to siodewall and IP so could not be removed safely. Attention then turned to right fallopian tube. Right tube was then transected off using ligasure. Excellent hemostasis noted.     Attention was turned to the anterior abdominal wall. The fascia at the 10 mm port was closed with an interrupted suture of 0 vicryl using a Suture-Eaze device. The abdomen was deflated and all trocars were removed. The skin was closed with 4-0 Monocryl in a subcuticular fashion. Sponge, lap, and needle counts were correct x 2. The patient was taken to the recovery room in stable condition with the mota draining urine.     Luly Cui MD PGY-3  Obstetrics and Gynecology  [facility]

## 2024-10-23 NOTE — INTERVAL H&P NOTE
The patient has been examined and the H&P has been reviewed:    I concur with the findings and no changes have occurred since H&P was written.    Surgery risks, benefits and alternative options discussed and understood by patient/family.          Active Hospital Problems    Diagnosis  POA    *Dysmenorrhea [N94.6]  Yes      Resolved Hospital Problems   No resolved problems to display.

## 2024-10-23 NOTE — INTERVAL H&P NOTE
Shayna Melchor is 44 y.o.  presenting for scheduled TLH/BS.    Temp:  [98.1 °F (36.7 °C)] 98.1 °F (36.7 °C)  Pulse:  [88] 88  Resp:  [18] 18  SpO2:  [98 %] 98 %  BP: (137)/(72) 137/72    General: NAD, alert, oriented, cooperative  HEENT: NCAT, EOM grossly intact  Lungs: Normal WOB  Heart: regular rate  Abdomen: soft, nondistended, nontender, no rebound or guarding    Consents in chart. Pre-operative heparin not indicated. All questions answered and concerns addressed. To OR for planned procedure.      Luly Cui MD PGY-3  Obstetrics and Gynecology  [facility]            Active Hospital Problems    Diagnosis  POA    *Dysmenorrhea [N94.6]  Yes      Resolved Hospital Problems   No resolved problems to display.

## 2024-10-23 NOTE — ANESTHESIA PROCEDURE NOTES
Intubation    Date/Time: 10/23/2024 12:13 PM    Performed by: Audrey Hart CRNA  Authorized by: Justo Marsh MD    Intubation:     Induction:  Intravenous    Intubated:  Postinduction    Mask Ventilation:  Easy mask    Attempts:  1    Attempted By:  CRNA    Method of Intubation:  Video laryngoscopy    Blade:  Patricia 3    Laryngeal View Grade: Grade I - full view of cords      Difficult Airway Encountered?: No      Complications:  None    Airway Device:  Oral endotracheal tube    Airway Device Size:  7.5    Style/Cuff Inflation:  Cuffed    Inflation Amount (mL):  3    Tube secured:  22    Secured at:  The lips    Placement Verified By:  Capnometry    Complicating Factors:  Obesity and oropharyngeal edema or fat    Findings Post-Intubation:  BS equal bilateral

## 2024-10-23 NOTE — TRANSFER OF CARE
"Anesthesia Transfer of Care Note    Patient: Shayna Melchor    Procedure(s) Performed: Procedure(s) (LRB):  HYSTERECTOMY, TOTAL, LAPAROSCOPIC (N/A)  SALPINGECTOMY, LAPAROSCOPIC (Right)    Patient location: PACU    Anesthesia Type: general    Transport from OR: Transported from OR on 2-3 L/min O2 by NC with adequate spontaneous ventilation    Post pain: adequate analgesia    Post assessment: no apparent anesthetic complications    Post vital signs: stable    Level of consciousness: awake and alert    Nausea/Vomiting: no nausea/vomiting    Complications: none    Transfer of care protocol was followed      Last vitals: Visit Vitals  BP (!) 98/57 (BP Location: Right arm, Patient Position: Lying)   Pulse 95   Temp 37 °C (98.6 °F) (Temporal)   Resp 18   Ht 5' 5" (1.651 m)   Wt 107.5 kg (237 lb)   LMP 10/01/2024 (Approximate)   SpO2 100%   Breastfeeding No   BMI 39.44 kg/m²     "

## 2024-10-23 NOTE — BRIEF OP NOTE
[facility] ([place])  Brief Operative Note    SUMMARY     Surgery Date: 10/23/2024     Surgeons and Role:     * Rima Vann, DO - Primary    Assisting Surgeon: None    Pre-op Diagnosis:  Dysmenorrhea [N94.6]    Post-op Diagnosis:  Post-Op Diagnosis Codes:     * Dysmenorrhea [N94.6]    Procedure(s) (LRB):  HYSTERECTOMY, TOTAL, LAPAROSCOPIC (N/A)  SALPINGECTOMY, LAPAROSCOPIC (Right)    Anesthesia: General    Implants:  * No implants in log *    Operative Findings: normal uterus, tubal segments bilaterally, minimal scarring of bladder to uterus, easily taken down.  Left ovarian simple cyst, drained.  Left fallopian segment with scarring to siodewall and IP so could not be removed safely, right tubal segment removed easily.    Estimated Blood Loss: 30 mL    Estimated Blood Loss has been documented.         Specimens:   Specimen (24h ago, onward)       Start     Ordered    10/23/24 1459  Specimen to Pathology, Surgery Gynecology and Obstetrics  Once        Comments: Pre-op Diagnosis: Dysmenorrhea [N94.6]Procedure(s):HYSTERECTOMY, TOTAL, LAPAROSCOPICBILATERAL SALPINGECTOMY, LAPAROSCOPIC Number of specimens: 2Name of specimens: 1. UTERUS CERVIX2. RIGHT FALLOPIAN TUBE     References:    Click here for ordering Quick Tip   Question Answer Comment   Procedure Type: Gynecology and Obstetrics    Specimen Class: Routine/Screening    Release to patient Immediate        10/23/24 1507                    [id number]

## 2024-10-23 NOTE — ANESTHESIA POSTPROCEDURE EVALUATION
Anesthesia Post Evaluation    Patient: Shayna Melchor    Procedure(s) Performed: Procedure(s) (LRB):  HYSTERECTOMY, TOTAL, LAPAROSCOPIC (N/A)  SALPINGECTOMY, LAPAROSCOPIC (Right)    Final Anesthesia Type: general      Patient location during evaluation: PACU  Patient participation: Yes- Able to Participate  Level of consciousness: awake and alert  Post-procedure vital signs: reviewed and stable  Pain management: adequate  Airway patency: patent    PONV status at discharge: No PONV  Anesthetic complications: no      Cardiovascular status: blood pressure returned to baseline  Respiratory status: unassisted  Hydration status: euvolemic  Follow-up not needed.              Vitals Value Taken Time   /71 10/23/24 1632   Temp 37 °C (98.6 °F) 10/23/24 1535   Pulse 93 10/23/24 1646   Resp 16 10/23/24 1630   SpO2 95 % 10/23/24 1646   Vitals shown include unfiled device data.      No case tracking events are documented in the log.      Pain/Panfilo Score: Pain Rating Prior to Med Admin: 8 (10/23/2024  4:21 PM)  Pain Rating Post Med Admin: 7 (10/23/2024  4:35 PM)  Panfilo Score: 8 (10/23/2024  4:35 PM)

## 2024-10-24 VITALS
HEIGHT: 65 IN | DIASTOLIC BLOOD PRESSURE: 83 MMHG | RESPIRATION RATE: 15 BRPM | WEIGHT: 249.81 LBS | BODY MASS INDEX: 41.62 KG/M2 | OXYGEN SATURATION: 98 % | SYSTOLIC BLOOD PRESSURE: 132 MMHG | TEMPERATURE: 99 F | HEART RATE: 77 BPM

## 2024-10-24 LAB — POCT GLUCOSE: 76 MG/DL (ref 70–110)

## 2024-10-24 PROCEDURE — 63600175 PHARM REV CODE 636 W HCPCS

## 2024-10-24 PROCEDURE — 25000003 PHARM REV CODE 250

## 2024-10-24 RX ADMIN — DOCUSATE SODIUM 100 MG: 100 CAPSULE, LIQUID FILLED ORAL at 09:10

## 2024-10-24 RX ADMIN — CARVEDILOL 6.25 MG: 6.25 TABLET, FILM COATED ORAL at 09:10

## 2024-10-24 RX ADMIN — OXYCODONE HYDROCHLORIDE 5 MG: 5 TABLET ORAL at 09:10

## 2024-10-24 RX ADMIN — ACETAMINOPHEN 1000 MG: 500 TABLET, FILM COATED ORAL at 05:10

## 2024-10-24 RX ADMIN — MUPIROCIN: 20 OINTMENT TOPICAL at 09:10

## 2024-10-24 RX ADMIN — KETOROLAC TROMETHAMINE 15 MG: 30 INJECTION, SOLUTION INTRAMUSCULAR; INTRAVENOUS at 05:10

## 2024-10-24 RX ADMIN — OXYCODONE HYDROCHLORIDE 10 MG: 10 TABLET ORAL at 04:10

## 2024-10-24 NOTE — CARE UPDATE
Resident to bedside to assess post operative pain.    Pt reports lower abdominal cramping, 7.5/10 in intensity, not relieved by Oxycodone or flexeril. States that this pain is unlike any pain she experienced with previous  section. Otherwise denies any SOB, headaches, or dizziness. Denies passing gas. Mota catheter not yet removed secondary to patient intolerance of removal.     Temp:  [97.5 °F (36.4 °C)-98.6 °F (37 °C)] 98.1 °F (36.7 °C)  Pulse:  [80-96] 82  Resp:  [16-18] 18  SpO2:  [92 %-100 %] 99 %  BP: ()/(57-81) 133/76    Physical Exam:  General: laying in bed, tearful   Card: RRR  Pulm: breathing comfortably on room air   Abdomen: soft, nontender to palpation. No rebound or guarding on exam. Port sites C/D/I       Plan:  -plan to admit overnight for pain management   -describes pain as cramping like in nature   -PE overall reassuring, with soft abdomen, incision sites C/D/I    -s/p flexaril and oxycodone 5mg without relief   -oxy 10mg ordered PRN (previously oxy 5)   -mota catheter still in place. Will attempt removal overnight per pt tolerance   -simethicone for gas pain    -will continue to monitor patient symptoms     HTN: controlled of 2 medication regimen  -normal cardiac studies/ normal stress EKG  -prior tachycardia resolved     EHSAN:  Resume normal interventions on discharge to home    Judy Ruby MD (Mary)   Obstetrics and Gynecology, PGY1

## 2024-10-24 NOTE — PLAN OF CARE
Case Management Final Discharge Note      Discharge Disposition: Home    New DME ordered / company name: None    Relevant SDOH / Transition of Care Barriers:  None    Primary Caretaker and contact information: Spouse, Driss     Scheduled followup appointment: 11/6/2024 8:15 AM     Referrals placed: NA    Transportation: Spouse         Patient and family educated on discharge services and updated on DC plan. Patient clear to discharge from Case Management Perspective.     [facility] ([address])  Discharge Final Note    Primary Care Provider: Stefanie Baltazar MD    Expected Discharge Date: 10/24/2024    Final Discharge Note (most recent)       Final Note - 10/24/24 1021          Final Note    Assessment Type Final Discharge Note     Anticipated Discharge Disposition Home or Self Care     Hospital Resources/Appts/Education Provided Provided patient/caregiver with written discharge plan information;Appointments scheduled and added to AVS        Post-Acute Status    Post-Acute Authorization Other     Other Status No Post-Acute Service Needs     Discharge Delays None known at this time                   Contact Rima Gurrola DO   Specialty: Obstetrics and Gynecology    [address] [place] [id number]   Phone: [phone]       Next Steps: Follow up in 4 week(s)    Instructions: Please attend post-operative visit with Dr. Vann in 4-6 weeks

## 2024-10-24 NOTE — PLAN OF CARE
Pt lying in bed. Assessment completed; no pain or distress noted. Plan of care discussed with pt, no questions. Tolerating meals and ambulating to void. Call light within reach and bed lowered.     1028  Discharge education given; no questions or concerns. IV removed and intact.

## 2024-10-24 NOTE — PROGRESS NOTES
Progress Note  Gynecology    Admit Date: 10/23/2024  LOS: 2    Reason for Admission:  Status post hysterectomy    SUBJECTIVE:     Shayna Melchor is a 44 y.o.  who is POD#1 from TLH/BS for AUB and dysmenorrhea.   Pt doing well this morning. Pain is well controlled with oxycodone. Patient reports pain subsided at around 3 AM. She has not yet tried eating. She denies nausea or vomiting. She is not yet ambulating. Voiding via mota catheter. Not passing flatus or having bowel movements.    OBJECTIVE:     Vital Signs   Temp:  [97.5 °F (36.4 °C)-100 °F (37.8 °C)] 99.1 °F (37.3 °C)  Pulse:  [] 79  Resp:  [11-18] 16  SpO2:  [92 %-100 %] 97 %  BP: ()/(57-81) 127/70      Intake/Output Summary (Last 24 hours) at 10/24/2024 0619  Last data filed at 10/24/2024 0410  Gross per 24 hour   Intake 1920 ml   Output 1715 ml   Net 205 ml       Physical Exam:  Gen: A&Ox3, NAD  CV: Regular rate  Pulm: Normal respiratory effort  Abd: soft, non-distended, mildly tender to palpation without rebound or guarding  Inc: lateral trocar incisions clean dry and intact with steri strips and band aids in place. Umbilical trocar incision with steri-strips and bandaid in place with moderate shadowing.   Ext: No peripheral edema, SCDs in place   : Mota in place draining clear yellow urine     Laboratory:  Lab Results   Component Value Date    WBC 11.02 10/03/2024    HGB 12.0 10/03/2024    HCT 35.4 (L) 10/03/2024    MCV 96 10/03/2024     10/03/2024         BMP  Lab Results   Component Value Date     10/03/2024    K 4.2 10/03/2024     10/03/2024    CO2 23 10/03/2024    BUN 12 10/03/2024    CREATININE 0.8 10/03/2024    CALCIUM 9.7 10/03/2024    ANIONGAP 9 10/03/2024    EGFRNORACEVR >60.0 10/03/2024     Lab Results   Component Value Date    ALT 28 10/03/2024    AST 9 (L) 10/03/2024    ALKPHOS 81 10/03/2024    BILITOT 0.4 10/03/2024         ASSESSMENT/PLAN:     Active Hospital Problems    Diagnosis  POA    *S/P  [facility]/ [Z90.710]  No    Dysmenorrhea [N94.6]  Yes    Asthma in adult, mild intermittent, uncomplicated [J45.20]  Yes    Obstructive sleep apnea of adult [G47.33]  Yes    Major depressive disorder, recurrent, mild [F33.0]  Yes    Class 3 severe obesity due to excess calories with serious comorbidity and body mass index (BMI) of 40.0 to 44.9 in adult [E66.813, E66.01, Z68.41]  Not Applicable      Resolved Hospital Problems   No resolved problems to display.       Assessment: 44 y.o.  POD#1 from [facility]/ admitted for post-op pain control.    Plan:   1. Post-op   - Routine post-op advances  - Continue PRN pain medications. Required 2 oxy 5 and 1 oxy 10 overnight. Reports feeling better this AM.   - D/C mota and perform active voiding trial. UOP 1cc/kg/hr.  - Bowel function: -flatus/-BM  - Encourage ambulation   - Encourage IS  - H/H as above. EBL 40cc. No post-op CBC ordered.  - Encouraged diet this AM  - Antiemetics prn nausea/vomiting.  - SCDS for DVT prophylaxis    2. HTN  - Continue home meds     3. EHSAN  - Resume CPAP on dc    Dispo: As patient meets appropriate post-op milestones, plan for discharge as appropriate.     Luly Cui MD  Obstetrics and Gynecology

## 2024-10-24 NOTE — OR NURSING
Called resident to notify her patient is still complaining of pain to lower abdomen. 7/10.  Patient is refusing to do voiding trial and is requesting to be admitted to do severe pain.  Resident ok to admit

## 2024-10-24 NOTE — PLAN OF CARE
Problem: Adult Inpatient Plan of Care  Goal: Plan of Care Review  Outcome: Progressing  Goal: Absence of Hospital-Acquired Illness or Injury  Outcome: Progressing  Goal: Optimal Comfort and Wellbeing  Outcome: Progressing     Problem: Infection  Goal: Absence of Infection Signs and Symptoms  Outcome: Progressing     Problem: Fall Injury Risk  Goal: Absence of Fall and Fall-Related Injury  Outcome: Progressing     Problem: Hysterectomy Total or Partial  Goal: Absence of Bleeding  Outcome: Progressing  Goal: Absence of Infection Signs and Symptoms  Outcome: Progressing  Goal: Acceptable Pain Control  Outcome: Progressing  Goal: Nausea and Vomiting Relief  Outcome: Progressing  Goal: Effective Oxygenation and Ventilation  Outcome: Progressing   POC reviewed with pt who verbalized understanding. AAOx4. VSS on RA. Resting between care. Lap sites intact. Pain managed with PRN meds per MAR. Abreu in place. Remains free of falls and injury. Call light in reach and rounds made for safety.

## 2024-10-30 ENCOUNTER — TELEPHONE (OUTPATIENT)
Dept: OBSTETRICS AND GYNECOLOGY | Facility: CLINIC | Age: 44
End: 2024-10-30
Payer: OTHER GOVERNMENT

## 2024-10-30 LAB
FINAL PATHOLOGIC DIAGNOSIS: NORMAL
GROSS: NORMAL
Lab: NORMAL

## 2024-11-01 ENCOUNTER — PATIENT MESSAGE (OUTPATIENT)
Dept: OBSTETRICS AND GYNECOLOGY | Facility: CLINIC | Age: 44
End: 2024-11-01
Payer: OTHER GOVERNMENT

## 2024-11-06 ENCOUNTER — OFFICE VISIT (OUTPATIENT)
Dept: OBSTETRICS AND GYNECOLOGY | Facility: CLINIC | Age: 44
End: 2024-11-06
Attending: OBSTETRICS & GYNECOLOGY
Payer: OTHER GOVERNMENT

## 2024-11-06 VITALS
BODY MASS INDEX: 40 KG/M2 | WEIGHT: 240.06 LBS | DIASTOLIC BLOOD PRESSURE: 82 MMHG | HEIGHT: 65 IN | SYSTOLIC BLOOD PRESSURE: 136 MMHG

## 2024-11-06 DIAGNOSIS — Z90.710 STATUS POST HYSTERECTOMY: Primary | ICD-10-CM

## 2024-11-06 PROCEDURE — 99024 POSTOP FOLLOW-UP VISIT: CPT | Mod: ,,, | Performed by: OBSTETRICS & GYNECOLOGY

## 2024-11-06 PROCEDURE — 99214 OFFICE O/P EST MOD 30 MIN: CPT | Mod: PBBFAC | Performed by: OBSTETRICS & GYNECOLOGY

## 2024-11-06 PROCEDURE — 99999 PR PBB SHADOW E&M-EST. PATIENT-LVL IV: CPT | Mod: PBBFAC,,, | Performed by: OBSTETRICS & GYNECOLOGY

## 2024-11-06 NOTE — PROGRESS NOTES
"CC: Postop visit    Shayna Melchor is a 44 y.o. female  post-op from a [facility]H/ BS on 10/23/24.  Patient is Doing well postoperatively.  Pre-op pain has resolved, doing well with medication dosing changes.  Has had hand surgery since her hyst.    The pathology revealed:   Diagnosis 1. UTERUS AND CERVIX, HYSTERECTOMY:    UTERUS:  - Dyssynchronous endometrium with partial progesterone hormone effect.  - Medical device consistent with intrauterine device is present (for gross examination only).  - No hyperplasia, atypia, or malignancy.    CERVIX:  - Benign cervix with reactive changes.    - No high-grade dysplasia or malignancy.    2. FALLOPIAN TUBE, RIGHT, SALPINGECTOMY:  - Benign fallopian tube and fimbriated end.  - Negative for malignancy.       Past Medical History:   Diagnosis Date    ADHD (attention deficit hyperactivity disorder)     Depression     Hyperlipidemia     Hypertension     EHSAN (obstructive sleep apnea)     wears cpap    PTSD (post-traumatic stress disorder)      Past Surgical History:   Procedure Laterality Date     SECTION  2015, 2017    ENDOSCOPIC CARPAL TUNNEL RELEASE Right 2024    Procedure: RELEASE, CARPAL TUNNEL, ENDOSCOPIC - right;  Surgeon: Jamey Mccord MD;  Location: [facility] OR;  Service: Orthopedics;  Laterality: Right;    LAPAROSCOPIC SALPINGECTOMY Right 10/23/2024    Procedure: SALPINGECTOMY, LAPAROSCOPIC;  Surgeon: Rima Vann DO;  Location: [facility] OR;  Service: OB/GYN;  Laterality: Right;    LAPAROSCOPIC TOTAL HYSTERECTOMY N/A 10/23/2024    Procedure: HYSTERECTOMY, TOTAL, LAPAROSCOPIC;  Surgeon: Rima Vann DO;  Location: [facility] OR;  Service: OB/GYN;  Laterality: N/A;    SINUS SURGERY  2009    deviated septum    TUBAL LIGATION  2017       /82 (BP Location: Left arm, Patient Position: Sitting)   Ht 5' 5" (1.651 m)   Wt 108.9 kg (240 lb 1.3 oz)   LMP 10/01/2024 (Approximate) Comment: speci cup given to patient for dos  BMI " 39.95 kg/m²     ROS:  GENERAL: No fever, chills, fatigability or weight loss.  VULVAR: No pain, no lesions and no itching.  VAGINAL: No relaxation, no itching, no discharge, no abnormal bleeding and no lesions.  ABDOMEN: No abdominal pain. Denies nausea. Denies vomiting. No diarrhea. No constipation  BREAST: Denies pain. No lumps. No discharge.  URINARY: No incontinence, no nocturia, no frequency and no dysuria.  CARDIOVASCULAR: No chest pain. No shortness of breath. No leg cramps.  NEUROLOGICAL: No headaches. No vision changes.    PE:   GEN: AAO x , NAD  ABD: incisions are C/D/I  PELVIC: Normal external female genitalia without lesions. Normal hair distribution. Adequate perineal body, normal urethral meatus. Vagina moist and without lesions or discharge. No significant cystocele or rectocele. Uterus and cervix surgically absent. Vaginal cuff intact and appears normal. Bimanual exam revealed no masses, tenderness or abnormality.        ICD-10-CM ICD-9-CM    1. S/P TLH/BS  Z90.710 V88.01             No follow-ups on file.

## 2024-11-07 ENCOUNTER — OFFICE VISIT (OUTPATIENT)
Dept: ORTHOPEDICS | Facility: CLINIC | Age: 44
End: 2024-11-07
Payer: OTHER GOVERNMENT

## 2024-11-07 ENCOUNTER — PATIENT MESSAGE (OUTPATIENT)
Dept: SLEEP MEDICINE | Facility: CLINIC | Age: 44
End: 2024-11-07
Payer: OTHER GOVERNMENT

## 2024-11-07 ENCOUNTER — PATIENT MESSAGE (OUTPATIENT)
Dept: ORTHOPEDICS | Facility: CLINIC | Age: 44
End: 2024-11-07

## 2024-11-07 DIAGNOSIS — G56.01 CARPAL TUNNEL SYNDROME OF RIGHT WRIST: Primary | ICD-10-CM

## 2024-11-07 DIAGNOSIS — G56.00 CTS (CARPAL TUNNEL SYNDROME): ICD-10-CM

## 2024-11-07 PROCEDURE — 99214 OFFICE O/P EST MOD 30 MIN: CPT | Mod: S$PBB,,, | Performed by: ORTHOPAEDIC SURGERY

## 2024-11-07 PROCEDURE — 99999 PR PBB SHADOW E&M-EST. PATIENT-LVL IV: CPT | Mod: PBBFAC,,, | Performed by: ORTHOPAEDIC SURGERY

## 2024-11-07 PROCEDURE — 99214 OFFICE O/P EST MOD 30 MIN: CPT | Mod: PBBFAC | Performed by: ORTHOPAEDIC SURGERY

## 2024-11-07 RX ORDER — CEFAZOLIN SODIUM 2 G/50ML
2 SOLUTION INTRAVENOUS
OUTPATIENT
Start: 2024-11-07

## 2024-11-07 RX ORDER — MUPIROCIN 20 MG/G
OINTMENT TOPICAL
OUTPATIENT
Start: 2024-11-07

## 2024-11-07 NOTE — H&P (VIEW-ONLY)
[facility]  History & Physical  Orthopedics     SUBJECTIVE:       COVID-19 attestation:  This patient was treated during the COVID-19 pandemic.  This was discussed with the patient, they are aware of our current policies and procedures, were given the option of delaying their visit and or switching to a virtual visit, delaying their surgery when applicable, and they elect to proceed.     Chief Complaint: bilateral CTS (R>L)     Referring Provider: No ref. provider found      History of Present Illness:  Patient is a 44 y.o. right hand dominant female who presents today with complaints of bilateral numbness/tingling for about 2 years. She states it has been worsening over the past 6 months. She has been wearing carpal tunnel braces at night with mild improvement. She takes ibuprofen as needed. She currently has EMG ordered but not yet scheduled.       Interval history November 7, 2024: The patient returns today for re-evaluation.  She reports that her carpal tunnel symptoms are stable from her last visit.  She had a recent EMG returns for those results and re-evaluation today with no new complaints.     The patient is a/an therapist.     Onset of symptoms/DOI was >2y ago.     Symptoms are aggravated by activity, movement, driving, and at night.     Symptoms are alleviated by rest and immobilization.     Symptoms consist of pain and numbness/tingling.     The patient rates their pain as a 2/10.     Attempted treatment(s) and/or interventions include activity modifications, rest, anti-inflammatory medications and immobilization.     The patient denies any fevers, chills, N/V, D/C and presents for evaluation.             Past Medical History:   Diagnosis Date    ADHD (attention deficit hyperactivity disorder)      Depression      Hyperlipidemia      Hypertension      EHSAN (obstructive sleep apnea)       wears cpap    PTSD (post-traumatic stress disorder)              Past Surgical History:    Procedure Laterality Date     SECTION   2015, 2017    LAPAROSCOPIC SALPINGECTOMY Right 10/23/2024     Procedure: SALPINGECTOMY, LAPAROSCOPIC;  Surgeon: Rima Vann DO;  Location: [facility] OR;  Service: OB/GYN;  Laterality: Right;    LAPAROSCOPIC TOTAL HYSTERECTOMY N/A 10/23/2024     Procedure: HYSTERECTOMY, TOTAL, LAPAROSCOPIC;  Surgeon: Rima Vann DO;  Location: [facility] OR;  Service: OB/GYN;  Laterality: N/A;    SINUS SURGERY   2009     deviated septum    TUBAL LIGATION   2017            Review of patient's allergies indicates:   Allergen Reactions    Benadryl allergy-sinus Palpitations       tachycardia    Ciprofloxacin Hives, Shortness Of Breath and Other (See Comments)       hives    Diphenhydramine Shortness Of Breath, Palpitations and Other (See Comments)       Other Reaction(s): Other          Fish containing products Other (See Comments), Anaphylaxis, Hives and Shortness Of Breath       White fish     Any type of fish.    Latex, natural rubber Hives    Nickel Hives and Rash      Social History          Social History Narrative    Not on file             Family History   Problem Relation Name Age of Onset    Arthritis Mother Suki R      Depression Mother Suki R      Diabetes Mother Suki R      Hyperlipidemia Mother Suki R      Hypertension Mother Suki R      Mental illness Mother Suki R      Vision loss Mother Suki R      Depression Father Lanaux R      Mental illness Father Lanaux R      Vision loss Father Lanaux R      Depression Sister Sejal R      Learning disabilities Sister Sejal R      Mental illness Sister Sejal R      Breast cancer Paternal Aunt Jade R      Cancer Paternal Aunt Jade R           breast    Breast cancer Paternal Aunt        Asthma Maternal Grandmother Lesli N      Depression Maternal Grandmother Lesli N      Arthritis Paternal Grandmother Sejal R      Cancer Paternal Grandmother Sejal R           breast     Depression Paternal Grandmother Sejal R      Miscarriages / Stillbirths Paternal Grandmother Sejal R      Vision loss Paternal Grandmother Sejal R      Breast cancer Paternal Grandfather Mariano R      Heart disease Paternal Grandfather Mariano R      Depression Brother Harpreet TURNER      Learning disabilities Brother Harpreet TURNER      Mental illness Brother Harpreet TURNER             Current Medications      Current Outpatient Medications:     albuterol (PROAIR HFA) 90 mcg/actuation inhaler, , Disp: , Rfl:     azelastine (ASTELIN) 137 mcg (0.1 %) nasal spray, 2 sprays (274 mcg total) by Nasal route 2 (two) times daily., Disp: 30 mL, Rfl: 11    busPIRone (BUSPAR) 5 MG Tab, Take 1 tablet (5 mg total) by mouth once daily., Disp: 90 tablet, Rfl: 0    carvediloL (COREG) 6.25 MG tablet, Take 1 tablet (6.25 mg total) by mouth 2 (two) times daily with meals., Disp: 180 tablet, Rfl: 3    clonazePAM (KLONOPIN) 1 MG tablet, Take 1 tablet (1 mg total) by mouth every evening., Disp: 30 tablet, Rfl: 3    EPINEPHrine (EPIPEN 2-TRINITY) 0.3 mg/0.3 mL AtIn, Inject 0.3 mLs (0.3 mg total) into the muscle once. for 1 dose, Disp: 0.3 mL, Rfl: 6    esomeprazole (NEXIUM) 40 MG capsule, Take 40 mg by mouth before breakfast., Disp: , Rfl:     famotidine (PEPCID) 20 MG tablet, Take 1 tablet (20 mg total) by mouth every evening., Disp: 30 tablet, Rfl: 11    hyoscyamine (LEVSIN) 0.125 mg Subl, , Disp: , Rfl:     ibuprofen (ADVIL,MOTRIN) 800 MG tablet, Take 1 tablet (800 mg total) by mouth 2 (two) times daily with meals., Disp: 30 tablet, Rfl: 2    montelukast (SINGULAIR) 10 mg tablet, Take 10 mg by mouth every evening., Disp: , Rfl:     omega-3s/dha/epa/algal oil (MEGARED ADVANCED OMEGA-3 ALGAE ORAL), Algae based omega 3.  IWi one po daily., Disp: , Rfl:     ondansetron (ZOFRAN) 8 MG tablet, TK 1 T PO BID PRN, Disp: , Rfl:     oxyCODONE-acetaminophen (PERCOCET) 5-325 mg per tablet, Take 1 tablet by mouth every 4 (four) hours as needed for Pain.  (Patient not taking: Reported on 11/6/2024), Disp: 20 tablet, Rfl: 0    telmisartan (MICARDIS) 80 MG Tab, Take 1 tablet (80 mg total) by mouth once daily., Disp: 90 tablet, Rfl: 3    vilazodone (VIIBRYD) 20 mg Tab, Take 1 tablet (20 mg total) by mouth once daily., Disp: 30 tablet, Rfl: 2           Review of Systems:  As per HPI otherwise noncontributory     OBJECTIVE:       Vital Signs (Most Recent):  Vitals   There were no vitals filed for this visit.        There is no height or weight on file to calculate BMI.        Physical Exam:  Constitutional: The patient appears well-developed and well-nourished. No distress.   Skin: No lesions appreciated  Head: Normocephalic and atraumatic.   Nose: Nose normal.   Ears: No deformities seen  Eyes: Conjunctivae and EOM are normal.   Neck: No tracheal deviation present.   Cardiovascular: Normal rate and intact distal pulses.    Pulmonary/Chest: Effort normal. No respiratory distress.   Abdominal: There is no guarding.   Neurological: The patient is alert.   Psychiatric: The patient has a normal mood and affect.      Bilateral Hand/Wrist Examination:     Observation/Inspection:  Swelling                       none                  Deformity                     none  Discoloration               none                  Scars                           none                  Atrophy                        none     HAND/WRIST EXAMINATION:  Finkelstein's Test                                Neg  WHAT Test                                         Neg  Snuff box tenderness                          Neg  Preciado's Test                                     Neg  Hook of Hamate Tenderness              Neg  CMC grind                                           Neg  Circumduction test                              Neg     Neurovascular Exam:  Digits WWP, brisk CR < 3s throughout  NVI motor/LTS to M/R/U nerves, radial pulse 2+  Tinel's Test - Carpal Tunnel                Positive  Tinel's Test -  Cubital Tunnel               Neg  Phalen's Test                                      Neg  Median Nerve Compression TestPositive     ROM hand full, painless     ROM wrist full, painless    ROM elbow full, painless     Abdomen not guarded  Respirations nonlabored  Perfusion intact     Diagnostic Results:     Imaging - I independently viewed the patient's imaging as well as the radiology report.  Xrays of the patient's bilateral hands demonstrate no evidence of any acute fractures or dislocations or significant degenerative changes.     EMG - IMPRESSIONS:  There is electrophysiologic evidence of a bilateral sensory median mononeuropathy across the wrist (I.e. Carpal tunnel syndrome).  There is no motor axonal loss.  There is no active denervation.  This is graded as Mild in severity bilaterally, slightly worse on the right relatively.      ASSESSMENT/PLAN:       44 y.o. yo female with bilateral CTS (R>L)  Plan: The patient and I had a thorough discussion today.  We discussed the working diagnosis as well as several other potential alternative diagnoses.  Treatment options were discussed, both conservative and surgical.  Conservative treatment options would include things such as activity modifications, workplace modifications, a period of rest, oral vs topical OTC and prescription anti-inflammatory medications, occupational therapy, splinting/bracing, immobilization, corticosteroid injections, and others.  Surgical options were discussed as well.      At this time, the patient would like to proceed with   A right-sided endoscopic versus open carpal tunnel release on November 13, 2024 which I feel is reasonable.  I will get this set up.          The patient has not responded to adequate non operative treatment at this time and/or non operative treatment is not indicated. Thus, the risks, benefits and alternatives to surgery were discussed with the patient in detail.  Specific risks include but are not limited to  bleeding, infection, vessel and/or nerve damage, pain, numbness, tingling, complex regional pain syndrome, compartment syndrome, failure to return to pre-injury and/or preoperative functional status, scar sensitivity, delayed healing, inability to return to work, pulley injury, tendon injury, bowstringing, partial and/or incomplete relief of symptoms, weakness, persistence of and/or worsening of symptoms, surgical failure, osteomyelitis, amputation, loss of function, stiffness, functional debility, dysfunction, decreased  strength, need for prolonged postoperative rehabilitation, need for further surgery, deep venous thrombosis, pulmonary embolism, arthritis and death.  The patient states an understanding and wishes to proceed with surgery.   All questions were answered.  No guarantees were implied or stated.  Written informed consent was obtained.          Jamey Mccord M.D.     Please be aware that this note has been generated with the assistance of [employer] voice-to-text.  Please excuse any spelling or grammatical errors.     Thank you for choosing Dr. Jamey Mccord for your orthopedic hand and upper extremity care. It is our goal to provide you with exceptional care that will help keep you healthy, active, and get you back in the game.     If you felt that you received exemplary care today, please consider leaving feedback for Dr. Mccord on [employer] at [email or web address].     Please do not hesitate to reach out to us via email, phone, or MyChart with any questions, concerns, or feedback.

## 2024-11-07 NOTE — H&P
[facility]  History & Physical  Orthopedics     SUBJECTIVE:       COVID-19 attestation:  This patient was treated during the COVID-19 pandemic.  This was discussed with the patient, they are aware of our current policies and procedures, were given the option of delaying their visit and or switching to a virtual visit, delaying their surgery when applicable, and they elect to proceed.     Chief Complaint: bilateral CTS (R>L)     Referring Provider: No ref. provider found      History of Present Illness:  Patient is a 44 y.o. right hand dominant female who presents today with complaints of bilateral numbness/tingling for about 2 years. She states it has been worsening over the past 6 months. She has been wearing carpal tunnel braces at night with mild improvement. She takes ibuprofen as needed. She currently has EMG ordered but not yet scheduled.       Interval history November 7, 2024: The patient returns today for re-evaluation.  She reports that her carpal tunnel symptoms are stable from her last visit.  She had a recent EMG returns for those results and re-evaluation today with no new complaints.     The patient is a/an therapist.     Onset of symptoms/DOI was >2y ago.     Symptoms are aggravated by activity, movement, driving, and at night.     Symptoms are alleviated by rest and immobilization.     Symptoms consist of pain and numbness/tingling.     The patient rates their pain as a 2/10.     Attempted treatment(s) and/or interventions include activity modifications, rest, anti-inflammatory medications and immobilization.     The patient denies any fevers, chills, N/V, D/C and presents for evaluation.             Past Medical History:   Diagnosis Date    ADHD (attention deficit hyperactivity disorder)      Depression      Hyperlipidemia      Hypertension      EHSAN (obstructive sleep apnea)       wears cpap    PTSD (post-traumatic stress disorder)              Past Surgical History:    Procedure Laterality Date     SECTION   2015, 2017    LAPAROSCOPIC SALPINGECTOMY Right 10/23/2024     Procedure: SALPINGECTOMY, LAPAROSCOPIC;  Surgeon: Rima Vann DO;  Location: [facility] OR;  Service: OB/GYN;  Laterality: Right;    LAPAROSCOPIC TOTAL HYSTERECTOMY N/A 10/23/2024     Procedure: HYSTERECTOMY, TOTAL, LAPAROSCOPIC;  Surgeon: Rima Vann DO;  Location: [facility] OR;  Service: OB/GYN;  Laterality: N/A;    SINUS SURGERY   2009     deviated septum    TUBAL LIGATION   2017            Review of patient's allergies indicates:   Allergen Reactions    Benadryl allergy-sinus Palpitations       tachycardia    Ciprofloxacin Hives, Shortness Of Breath and Other (See Comments)       hives    Diphenhydramine Shortness Of Breath, Palpitations and Other (See Comments)       Other Reaction(s): Other          Fish containing products Other (See Comments), Anaphylaxis, Hives and Shortness Of Breath       White fish     Any type of fish.    Latex, natural rubber Hives    Nickel Hives and Rash      Social History          Social History Narrative    Not on file             Family History   Problem Relation Name Age of Onset    Arthritis Mother Suki R      Depression Mother Suki R      Diabetes Mother Suki R      Hyperlipidemia Mother Suki R      Hypertension Mother Suki R      Mental illness Mother Suki R      Vision loss Mother Suki R      Depression Father Lanaux R      Mental illness Father Lanaux R      Vision loss Father Lanaux R      Depression Sister Sejal R      Learning disabilities Sister Sejal R      Mental illness Sister Sejal R      Breast cancer Paternal Aunt Jade R      Cancer Paternal Aunt Jade R           breast    Breast cancer Paternal Aunt        Asthma Maternal Grandmother Lesli N      Depression Maternal Grandmother Lesli N      Arthritis Paternal Grandmother Sejal R      Cancer Paternal Grandmother Sejal R           breast     Depression Paternal Grandmother Sejal R      Miscarriages / Stillbirths Paternal Grandmother Sejal R      Vision loss Paternal Grandmother Sejal R      Breast cancer Paternal Grandfather Mariano R      Heart disease Paternal Grandfather Mariano R      Depression Brother Harpreet TURENR      Learning disabilities Brother Harpreet TURNER      Mental illness Brother Harpreet TURNER             Current Medications      Current Outpatient Medications:     albuterol (PROAIR HFA) 90 mcg/actuation inhaler, , Disp: , Rfl:     azelastine (ASTELIN) 137 mcg (0.1 %) nasal spray, 2 sprays (274 mcg total) by Nasal route 2 (two) times daily., Disp: 30 mL, Rfl: 11    busPIRone (BUSPAR) 5 MG Tab, Take 1 tablet (5 mg total) by mouth once daily., Disp: 90 tablet, Rfl: 0    carvediloL (COREG) 6.25 MG tablet, Take 1 tablet (6.25 mg total) by mouth 2 (two) times daily with meals., Disp: 180 tablet, Rfl: 3    clonazePAM (KLONOPIN) 1 MG tablet, Take 1 tablet (1 mg total) by mouth every evening., Disp: 30 tablet, Rfl: 3    EPINEPHrine (EPIPEN 2-TRINITY) 0.3 mg/0.3 mL AtIn, Inject 0.3 mLs (0.3 mg total) into the muscle once. for 1 dose, Disp: 0.3 mL, Rfl: 6    esomeprazole (NEXIUM) 40 MG capsule, Take 40 mg by mouth before breakfast., Disp: , Rfl:     famotidine (PEPCID) 20 MG tablet, Take 1 tablet (20 mg total) by mouth every evening., Disp: 30 tablet, Rfl: 11    hyoscyamine (LEVSIN) 0.125 mg Subl, , Disp: , Rfl:     ibuprofen (ADVIL,MOTRIN) 800 MG tablet, Take 1 tablet (800 mg total) by mouth 2 (two) times daily with meals., Disp: 30 tablet, Rfl: 2    montelukast (SINGULAIR) 10 mg tablet, Take 10 mg by mouth every evening., Disp: , Rfl:     omega-3s/dha/epa/algal oil (MEGARED ADVANCED OMEGA-3 ALGAE ORAL), Algae based omega 3.  IWi one po daily., Disp: , Rfl:     ondansetron (ZOFRAN) 8 MG tablet, TK 1 T PO BID PRN, Disp: , Rfl:     oxyCODONE-acetaminophen (PERCOCET) 5-325 mg per tablet, Take 1 tablet by mouth every 4 (four) hours as needed for Pain.  (Patient not taking: Reported on 11/6/2024), Disp: 20 tablet, Rfl: 0    telmisartan (MICARDIS) 80 MG Tab, Take 1 tablet (80 mg total) by mouth once daily., Disp: 90 tablet, Rfl: 3    vilazodone (VIIBRYD) 20 mg Tab, Take 1 tablet (20 mg total) by mouth once daily., Disp: 30 tablet, Rfl: 2           Review of Systems:  As per HPI otherwise noncontributory     OBJECTIVE:       Vital Signs (Most Recent):  Vitals   There were no vitals filed for this visit.        There is no height or weight on file to calculate BMI.        Physical Exam:  Constitutional: The patient appears well-developed and well-nourished. No distress.   Skin: No lesions appreciated  Head: Normocephalic and atraumatic.   Nose: Nose normal.   Ears: No deformities seen  Eyes: Conjunctivae and EOM are normal.   Neck: No tracheal deviation present.   Cardiovascular: Normal rate and intact distal pulses.    Pulmonary/Chest: Effort normal. No respiratory distress.   Abdominal: There is no guarding.   Neurological: The patient is alert.   Psychiatric: The patient has a normal mood and affect.      Bilateral Hand/Wrist Examination:     Observation/Inspection:  Swelling                       none                  Deformity                     none  Discoloration               none                  Scars                           none                  Atrophy                        none     HAND/WRIST EXAMINATION:  Finkelstein's Test                                Neg  WHAT Test                                         Neg  Snuff box tenderness                          Neg  Preciado's Test                                     Neg  Hook of Hamate Tenderness              Neg  CMC grind                                           Neg  Circumduction test                              Neg     Neurovascular Exam:  Digits WWP, brisk CR < 3s throughout  NVI motor/LTS to M/R/U nerves, radial pulse 2+  Tinel's Test - Carpal Tunnel                Positive  Tinel's Test -  Cubital Tunnel               Neg  Phalen's Test                                      Neg  Median Nerve Compression TestPositive     ROM hand full, painless     ROM wrist full, painless    ROM elbow full, painless     Abdomen not guarded  Respirations nonlabored  Perfusion intact     Diagnostic Results:     Imaging - I independently viewed the patient's imaging as well as the radiology report.  Xrays of the patient's bilateral hands demonstrate no evidence of any acute fractures or dislocations or significant degenerative changes.     EMG - IMPRESSIONS:  There is electrophysiologic evidence of a bilateral sensory median mononeuropathy across the wrist (I.e. Carpal tunnel syndrome).  There is no motor axonal loss.  There is no active denervation.  This is graded as Mild in severity bilaterally, slightly worse on the right relatively.      ASSESSMENT/PLAN:       44 y.o. yo female with bilateral CTS (R>L)  Plan: The patient and I had a thorough discussion today.  We discussed the working diagnosis as well as several other potential alternative diagnoses.  Treatment options were discussed, both conservative and surgical.  Conservative treatment options would include things such as activity modifications, workplace modifications, a period of rest, oral vs topical OTC and prescription anti-inflammatory medications, occupational therapy, splinting/bracing, immobilization, corticosteroid injections, and others.  Surgical options were discussed as well.      At this time, the patient would like to proceed with   A right-sided endoscopic versus open carpal tunnel release on November 13, 2024 which I feel is reasonable.  I will get this set up.          The patient has not responded to adequate non operative treatment at this time and/or non operative treatment is not indicated. Thus, the risks, benefits and alternatives to surgery were discussed with the patient in detail.  Specific risks include but are not limited to  bleeding, infection, vessel and/or nerve damage, pain, numbness, tingling, complex regional pain syndrome, compartment syndrome, failure to return to pre-injury and/or preoperative functional status, scar sensitivity, delayed healing, inability to return to work, pulley injury, tendon injury, bowstringing, partial and/or incomplete relief of symptoms, weakness, persistence of and/or worsening of symptoms, surgical failure, osteomyelitis, amputation, loss of function, stiffness, functional debility, dysfunction, decreased  strength, need for prolonged postoperative rehabilitation, need for further surgery, deep venous thrombosis, pulmonary embolism, arthritis and death.  The patient states an understanding and wishes to proceed with surgery.   All questions were answered.  No guarantees were implied or stated.  Written informed consent was obtained.          Jamey Mccord M.D.     Please be aware that this note has been generated with the assistance of [employer] voice-to-text.  Please excuse any spelling or grammatical errors.     Thank you for choosing Dr. Jamey Mccord for your orthopedic hand and upper extremity care. It is our goal to provide you with exceptional care that will help keep you healthy, active, and get you back in the game.     If you felt that you received exemplary care today, please consider leaving feedback for Dr. Mccord on [employer] at [email or web address].     Please do not hesitate to reach out to us via email, phone, or MyChart with any questions, concerns, or feedback.

## 2024-11-10 PROBLEM — G56.01 CARPAL TUNNEL SYNDROME OF RIGHT WRIST: Status: ACTIVE | Noted: 2024-11-10

## 2024-11-10 RX ORDER — IBUPROFEN 600 MG/1
600 TABLET ORAL EVERY 8 HOURS PRN
Qty: 21 TABLET | Refills: 0 | Status: SHIPPED | OUTPATIENT
Start: 2024-11-10

## 2024-11-10 RX ORDER — TRAMADOL HYDROCHLORIDE 50 MG/1
50 TABLET ORAL EVERY 6 HOURS PRN
Qty: 20 TABLET | Refills: 0 | Status: SHIPPED | OUTPATIENT
Start: 2024-11-10

## 2024-11-10 RX ORDER — ACETAMINOPHEN 500 MG
1000 TABLET ORAL EVERY 8 HOURS PRN
Qty: 42 TABLET | Refills: 0 | Status: SHIPPED | OUTPATIENT
Start: 2024-11-10

## 2024-11-11 ENCOUNTER — ANESTHESIA EVENT (OUTPATIENT)
Dept: SURGERY | Facility: HOSPITAL | Age: 44
End: 2024-11-11
Payer: OTHER GOVERNMENT

## 2024-11-12 ENCOUNTER — TELEPHONE (OUTPATIENT)
Dept: ORTHOPEDICS | Facility: CLINIC | Age: 44
End: 2024-11-12
Payer: OTHER GOVERNMENT

## 2024-11-12 RX ORDER — BUSPIRONE HYDROCHLORIDE 5 MG/1
5 TABLET ORAL DAILY
Qty: 90 TABLET | Refills: 0 | Status: SHIPPED | OUTPATIENT
Start: 2024-11-12 | End: 2025-02-10

## 2024-11-13 ENCOUNTER — ANESTHESIA (OUTPATIENT)
Dept: SURGERY | Facility: HOSPITAL | Age: 44
End: 2024-11-13
Payer: OTHER GOVERNMENT

## 2024-11-13 ENCOUNTER — HOSPITAL ENCOUNTER (OUTPATIENT)
Facility: HOSPITAL | Age: 44
Discharge: HOME | End: 2024-11-13
Attending: ORTHOPAEDIC SURGERY | Admitting: ORTHOPAEDIC SURGERY
Payer: OTHER GOVERNMENT

## 2024-11-13 VITALS
HEART RATE: 80 BPM | BODY MASS INDEX: 39.99 KG/M2 | WEIGHT: 240 LBS | OXYGEN SATURATION: 96 % | TEMPERATURE: 98 F | RESPIRATION RATE: 18 BRPM | DIASTOLIC BLOOD PRESSURE: 80 MMHG | HEIGHT: 65 IN | SYSTOLIC BLOOD PRESSURE: 146 MMHG

## 2024-11-13 DIAGNOSIS — G56.00 CTS (CARPAL TUNNEL SYNDROME): ICD-10-CM

## 2024-11-13 DIAGNOSIS — G56.01 CARPAL TUNNEL SYNDROME OF RIGHT WRIST: Primary | ICD-10-CM

## 2024-11-13 PROCEDURE — 25000003 PHARM REV CODE 250: Performed by: ORTHOPAEDIC SURGERY

## 2024-11-13 PROCEDURE — 63600175 PHARM REV CODE 636 W HCPCS: Performed by: PHYSICIAN ASSISTANT

## 2024-11-13 PROCEDURE — 99900035 HC TECH TIME PER 15 MIN (STAT)

## 2024-11-13 PROCEDURE — 25000003 PHARM REV CODE 250: Performed by: PHYSICIAN ASSISTANT

## 2024-11-13 PROCEDURE — 27201423 OPTIME MED/SURG SUP & DEVICES STERILE SUPPLY: Performed by: ORTHOPAEDIC SURGERY

## 2024-11-13 PROCEDURE — 36000706: Performed by: ORTHOPAEDIC SURGERY

## 2024-11-13 PROCEDURE — 63600175 PHARM REV CODE 636 W HCPCS: Performed by: STUDENT IN AN ORGANIZED HEALTH CARE EDUCATION/TRAINING PROGRAM

## 2024-11-13 PROCEDURE — 64415 NJX AA&/STRD BRCH PLXS IMG: CPT | Performed by: STUDENT IN AN ORGANIZED HEALTH CARE EDUCATION/TRAINING PROGRAM

## 2024-11-13 PROCEDURE — 25000003 PHARM REV CODE 250: Performed by: NURSE ANESTHETIST, CERTIFIED REGISTERED

## 2024-11-13 PROCEDURE — 29848 WRIST ENDOSCOPY/SURGERY: CPT | Mod: RT,,, | Performed by: ORTHOPAEDIC SURGERY

## 2024-11-13 PROCEDURE — 63600175 PHARM REV CODE 636 W HCPCS: Performed by: NURSE ANESTHETIST, CERTIFIED REGISTERED

## 2024-11-13 PROCEDURE — 94761 N-INVAS EAR/PLS OXIMETRY MLT: CPT

## 2024-11-13 PROCEDURE — 37000008 HC ANESTHESIA 1ST 15 MINUTES: Performed by: ORTHOPAEDIC SURGERY

## 2024-11-13 PROCEDURE — 71000033 HC RECOVERY, INTIAL HOUR: Performed by: ORTHOPAEDIC SURGERY

## 2024-11-13 PROCEDURE — 37000009 HC ANESTHESIA EA ADD 15 MINS: Performed by: ORTHOPAEDIC SURGERY

## 2024-11-13 PROCEDURE — 36000707: Performed by: ORTHOPAEDIC SURGERY

## 2024-11-13 PROCEDURE — 71000015 HC POSTOP RECOV 1ST HR: Performed by: ORTHOPAEDIC SURGERY

## 2024-11-13 PROCEDURE — 63600175 PHARM REV CODE 636 W HCPCS: Performed by: ORTHOPAEDIC SURGERY

## 2024-11-13 RX ORDER — ONDANSETRON 4 MG/1
8 TABLET, ORALLY DISINTEGRATING ORAL EVERY 8 HOURS PRN
Status: DISCONTINUED | OUTPATIENT
Start: 2024-11-13 | End: 2024-11-13 | Stop reason: HOSPADM

## 2024-11-13 RX ORDER — LIDOCAINE HYDROCHLORIDE 20 MG/ML
INJECTION INTRAVENOUS
Status: DISCONTINUED | OUTPATIENT
Start: 2024-11-13 | End: 2024-11-13

## 2024-11-13 RX ORDER — MIDAZOLAM HYDROCHLORIDE 1 MG/ML
1 INJECTION, SOLUTION INTRAMUSCULAR; INTRAVENOUS
Status: DISCONTINUED | OUTPATIENT
Start: 2024-11-13 | End: 2024-11-13 | Stop reason: HOSPADM

## 2024-11-13 RX ORDER — MUPIROCIN 20 MG/G
OINTMENT TOPICAL
Status: DISCONTINUED | OUTPATIENT
Start: 2024-11-13 | End: 2024-11-13 | Stop reason: HOSPADM

## 2024-11-13 RX ORDER — ONDANSETRON HYDROCHLORIDE 2 MG/ML
INJECTION, SOLUTION INTRAVENOUS
Status: DISCONTINUED | OUTPATIENT
Start: 2024-11-13 | End: 2024-11-13

## 2024-11-13 RX ORDER — CELECOXIB 200 MG/1
400 CAPSULE ORAL
Status: COMPLETED | OUTPATIENT
Start: 2024-11-13 | End: 2024-11-13

## 2024-11-13 RX ORDER — SODIUM CHLORIDE 0.9 % (FLUSH) 0.9 %
10 SYRINGE (ML) INJECTION
Status: DISCONTINUED | OUTPATIENT
Start: 2024-11-13 | End: 2024-11-13 | Stop reason: HOSPADM

## 2024-11-13 RX ORDER — DEXAMETHASONE SODIUM PHOSPHATE 4 MG/ML
INJECTION, SOLUTION INTRA-ARTICULAR; INTRALESIONAL; INTRAMUSCULAR; INTRAVENOUS; SOFT TISSUE
Status: DISCONTINUED | OUTPATIENT
Start: 2024-11-13 | End: 2024-11-13

## 2024-11-13 RX ORDER — DEXMEDETOMIDINE HYDROCHLORIDE 100 UG/ML
INJECTION, SOLUTION INTRAVENOUS
Status: DISCONTINUED | OUTPATIENT
Start: 2024-11-13 | End: 2024-11-13

## 2024-11-13 RX ORDER — FENTANYL CITRATE 50 UG/ML
100 INJECTION, SOLUTION INTRAMUSCULAR; INTRAVENOUS
Status: DISCONTINUED | OUTPATIENT
Start: 2024-11-13 | End: 2024-11-13 | Stop reason: HOSPADM

## 2024-11-13 RX ORDER — HYDROCODONE BITARTRATE AND ACETAMINOPHEN 5; 325 MG/1; MG/1
1 TABLET ORAL EVERY 4 HOURS PRN
Status: DISCONTINUED | OUTPATIENT
Start: 2024-11-13 | End: 2024-11-13 | Stop reason: HOSPADM

## 2024-11-13 RX ORDER — ACETAMINOPHEN 500 MG
1000 TABLET ORAL
Status: COMPLETED | OUTPATIENT
Start: 2024-11-13 | End: 2024-11-13

## 2024-11-13 RX ORDER — MUPIROCIN 20 MG/G
OINTMENT TOPICAL 2 TIMES DAILY
Status: DISCONTINUED | OUTPATIENT
Start: 2024-11-13 | End: 2024-11-13 | Stop reason: HOSPADM

## 2024-11-13 RX ORDER — CEFAZOLIN 2 G/1
2 INJECTION, POWDER, FOR SOLUTION INTRAMUSCULAR; INTRAVENOUS
Status: DISCONTINUED | OUTPATIENT
Start: 2024-11-13 | End: 2024-11-13 | Stop reason: HOSPADM

## 2024-11-13 RX ORDER — DEXTROMETHORPHAN HYDROBROMIDE, GUAIFENESIN 5; 100 MG/5ML; MG/5ML
650 LIQUID ORAL EVERY 8 HOURS
Status: ON HOLD | COMMUNITY
End: 2024-11-13 | Stop reason: HOSPADM

## 2024-11-13 RX ORDER — FAMOTIDINE 10 MG/ML
INJECTION INTRAVENOUS
Status: DISCONTINUED | OUTPATIENT
Start: 2024-11-13 | End: 2024-11-13

## 2024-11-13 RX ORDER — PROPOFOL 10 MG/ML
VIAL (ML) INTRAVENOUS
Status: DISCONTINUED | OUTPATIENT
Start: 2024-11-13 | End: 2024-11-13

## 2024-11-13 RX ADMIN — PROPOFOL 100 MG: 10 INJECTION, EMULSION INTRAVENOUS at 08:11

## 2024-11-13 RX ADMIN — FENTANYL CITRATE 100 MCG: 50 INJECTION INTRAMUSCULAR; INTRAVENOUS at 07:11

## 2024-11-13 RX ADMIN — MEPIVACAINE HYDROCHLORIDE 30 ML: 15 INJECTION, SOLUTION EPIDURAL; INFILTRATION at 07:11

## 2024-11-13 RX ADMIN — CEFAZOLIN 2 G: 2 INJECTION, POWDER, FOR SOLUTION INTRAMUSCULAR; INTRAVENOUS at 08:11

## 2024-11-13 RX ADMIN — DEXAMETHASONE SODIUM PHOSPHATE 4 MG: 4 INJECTION, SOLUTION INTRAMUSCULAR; INTRAVENOUS at 08:11

## 2024-11-13 RX ADMIN — LIDOCAINE HYDROCHLORIDE 50 MG: 20 INJECTION INTRAVENOUS at 08:11

## 2024-11-13 RX ADMIN — ONDANSETRON 4 MG: 2 INJECTION INTRAMUSCULAR; INTRAVENOUS at 08:11

## 2024-11-13 RX ADMIN — CELECOXIB 400 MG: 200 CAPSULE ORAL at 06:11

## 2024-11-13 RX ADMIN — MIDAZOLAM 2 MG: 1 INJECTION INTRAMUSCULAR; INTRAVENOUS at 07:11

## 2024-11-13 RX ADMIN — DEXMEDETOMIDINE 8 MCG: 100 INJECTION, SOLUTION, CONCENTRATE INTRAVENOUS at 08:11

## 2024-11-13 RX ADMIN — FAMOTIDINE 20 MG: 10 INJECTION, SOLUTION INTRAVENOUS at 08:11

## 2024-11-13 RX ADMIN — PROPOFOL 50 MG: 10 INJECTION, EMULSION INTRAVENOUS at 08:11

## 2024-11-13 RX ADMIN — MUPIROCIN: 20 OINTMENT TOPICAL at 06:11

## 2024-11-13 RX ADMIN — ACETAMINOPHEN 1000 MG: 500 TABLET ORAL at 06:11

## 2024-11-13 NOTE — ANESTHESIA POSTPROCEDURE EVALUATION
Anesthesia Post Evaluation    Patient: Shayna Melchor    Procedure(s) Performed: Procedure(s) (LRB):  RELEASE, CARPAL TUNNEL, ENDOSCOPIC - right (Right)    Final Anesthesia Type: regional      Patient location during evaluation: [facility]  Patient participation: Yes- Able to Participate  Level of consciousness: awake and alert  Post-procedure vital signs: reviewed and stable  Pain management: adequate  Airway patency: patent  EHSAN mitigation strategies: Use of major conduction anesthesia (spinal/epidural) or peripheral nerve block and Multimodal analgesia  PONV status at discharge: No PONV  Anesthetic complications: no      Cardiovascular status: blood pressure returned to baseline  Respiratory status: unassisted, spontaneous ventilation and room air  Hydration status: euvolemic  Follow-up not needed.              Vitals Value Taken Time   /80 11/13/24 0930   Temp 36.7 °C (98 °F) 11/13/24 0930   Pulse 80 11/13/24 0930   Resp 18 11/13/24 0930   SpO2 96 % 11/13/24 0930         Event Time   Out of Recovery 09:34:00         Pain/Panfilo Score: Pain Rating Prior to Med Admin: 1 (11/13/2024  7:21 AM)  Panfilo Score: 10 (11/13/2024  9:04 AM)

## 2024-11-13 NOTE — ANESTHESIA PROCEDURE NOTES
L supraclavicular SS    Patient location during procedure: pre-op   Block not for primary anesthetic.  Reason for block: at surgeon's request and post-op pain management   Post-op Pain Location: L wrist sx   Start time: 11/13/2024 7:22 AM  Timeout: 11/13/2024 7:21 AM   End time: 11/13/2024 7:30 AM    Staffing  Authorizing Provider: Emiliano Tracey MD  Performing Provider: Emiliano Tracey MD    Staffing  Performed by: Emiliano rTacey MD  Authorized by: Emiliano Tracey MD    Preanesthetic Checklist  Completed: patient identified, IV checked, site marked, risks and benefits discussed, surgical consent, monitors and equipment checked, pre-op evaluation and timeout performed  Peripheral Block  Patient position: supine  Prep: ChloraPrep  Patient monitoring: heart rate, cardiac monitor, continuous pulse ox, continuous capnometry and frequent blood pressure checks  Block type: supraclavicular  Laterality: right  Injection technique: single shot  Needle  Needle type: Stimuplex   Needle gauge: 22 G  Needle length: 2 in  Needle localization: anatomical landmarks and ultrasound guidance   -ultrasound image captured on disc.  Assessment  Injection assessment: negative aspiration, negative parasthesia and local visualized surrounding nerve  Paresthesia pain: none  Heart rate change: no  Slow fractionated injection: yes  Pain Tolerance: no complaints and comfortable throughout block  Medications:    Medications: mepivacaine (CARBOCAINE) injection 15 mg/mL (1.5%) - Perineural   30 mL - 11/13/2024 7:30:00 AM    Additional Notes  W/ epi 1:200k  VSS.  [facility] RN monitoring vitals throughout procedure.  Patient tolerated procedure well.

## 2024-11-13 NOTE — INTERVAL H&P NOTE
The patient has been examined and the H&P has been reviewed:    I concur with the findings and no changes have occurred since H&P was written.    Surgery risks, benefits and alternative options discussed and understood by patient/family.    The patient has not responded to adequate non operative treatment at this time and/or non operative treatment is not indicated. Thus, the risks, benefits and alternatives to surgery were discussed with the patient in detail.  Specific risks include but are not limited to bleeding, infection, vessel and/or nerve damage, pain, numbness, tingling, complex regional pain syndrome, compartment syndrome, failure to return to pre-injury and/or preoperative functional status, scar sensitivity, delayed healing, inability to return to work, pulley injury, tendon injury, bowstringing, partial and/or incomplete relief of symptoms, weakness, persistence of and/or worsening of symptoms, surgical failure, osteomyelitis, amputation, loss of function, stiffness, functional debility, dysfunction, decreased  strength, need for prolonged postoperative rehabilitation, need for further surgery, deep venous thrombosis, pulmonary embolism, arthritis and death.  The patient states an understanding and wishes to proceed with surgery.   All questions were answered.  No guarantees were implied or stated.  Written informed consent was obtained.        Active Hospital Problems    Diagnosis  POA    *Carpal tunnel syndrome of right wrist [G56.01]  Yes      Resolved Hospital Problems   No resolved problems to display.

## 2024-11-13 NOTE — PLAN OF CARE
Pre op complete. Waiting on anesthesia consent. Pt's mother at bedside; will lock belongings in locker. All questions addressed at this time and call light in reach.

## 2024-11-13 NOTE — OP NOTE
ORTHOPEDIC SURGERY OPERATIVE NOTE    SERVICE: ORTHOPEDICS     DATE OF PROCEDURE: 11/13/2024     SURGEON: Jamey Mccord M.D.    ASSISTANT: MD Gordo    PREOPERATIVE DIAGNOSIS: Right sided carpal tunnel syndrome    POSTOPERATIVE DIAGNOSIS: Right sided carpal tunnel syndrome    PROCEDURE PERFORMED: Endoscopic Right sided carpal tunnel release, CPT code 68209    ANESTHESIA: Regional/MAC    ESTIMATED BLOOD LOSS: Minimal           SPECIMENS: None    COMPLICATIONS: None              CONDITION: Stable    IV FLUIDS: Crystalloid per anesthesia    IMPLANTS: None    TOURNIQUET TIME: 12 minutes at 250 mmHg    INDICATIONS FOR PROCEDURE: Shayna Melchor is a 44 y.o. female who presented to clinic with findings and workup consistent with carpal tunnel syndrome of the right hand.  The patient had not responded to nonoperative management and wished to proceed with surgical intervention.  The risks, benefits and alternatives to surgery were discussed with the patient at great length and in great detail.  Specific risks discussed include but are not limited to bleeding, infection, vessel damage, nerve damage, pain, numbness, tingling, weakness, stiffness, complex regional pain syndrome, hardware/surgical failure, need for further surgery, DVT, PE, arthritis, scar sensitivity, delayed healing, need for prolonged postoperative rehabilitation, and death amongst others.  The patient stated an understanding of the risks and wished to proceed with surgery.   All questions were answered.  No guarantees were implied or stated.  Informed consent was obtained.    PROCEDURE IN DETAIL: With the patient's participation in the preoperative holding area, the right upper extremity was marked as the surgical site. Preoperative checks were completed and consents were verified.  Regional anesthesia was administered.  The patient was brought to the Operating Room and placed in supine position.  A well-padded nonsterile tourniquet was placed on the  upper arm.  The right arm was then prepped and draped in the usual sterile fashion.  Timeout was called for to verify the correct site, procedure and patient.  All were in agreement and we proceeded.  MAC anesthesia was introduced.  Esmarch was utilized to exsanguinate the arm and tourniquet was insufflated to 250mmHg where it remained for the duration of the procedure.    Next, a small 1 cm transverse incision was made in line with a proximal wrist flexion crease beginning radially at the palmaris longus and extending ulnarly for 1 cm.  Dissection was continued to level of antebrachial fascia.  This was transversely incised in line with the skin incision.  A narrow double skin hook was then utilized to elevate the distal most aspect of the incision to gain access to the carpal tunnel.  A series of small and large dilators were utilized to dilate our planned path with the endoscope.  A synovial elevator was then utilized to free synovium from the undersurface of the transverse carpal ligament.  Washboard effect was confirmed.  At this time, the Arthrex endoscopic carpal tunnel system was introduced into the incision.  Confirmation of placement within the carpal tunnel was confirmed.  The endoscope was advanced the distal aspect of the transverse carpal ligament and the distal fat was visualized.  The median nerve was identified radial to the endoscope and was protected throughout the duration of the procedure.  Excellent visualization of the transverse carpal ligament along its entire length was confirmed with no interposed soft tissue.  I then began my division of the transverse carpal ligament from distally and extending in a proximal direction.  The knife was deployed and complete full-thickness transection of the transverse carpal ligament was performed beginning distally and working my way more proximally.  Complete division of the ligament was performed. Care was taken distally to ensure not to cut past  Kaplans Cardinal Line or injure the superficial palmar arch. The distal fat was visualized at the distalmost aspect of the ligament division. Tension and pressure within the carpal tunnel was dramatically improved and decreased status post ligament division.  The endoscope was then once again advanced more distally and completion of the division was confirmed.  Fat protruded through the divided ligament throughout.  The median nerve was identified along the length of our ligament division and remained intact and uninjured.  The endoscope was then removed from the wound and the antebrachial forearm fascia was divided in line with the ligament division by hand with tenotomy scissors.  The wound was then irrigated with copious amounts of sterile saline.  Skin was closed with 4 0 nylon suture.        Sterile dressings were applied consisting of Xeroform 4 x 4 gauze cast padding and 2 in Ace wrap to the hand.  Tourniquet was deflated and brisk cap refill in [place] throughout the operative upper extremity.  There was no significant bleeding.      DISPOSITION: The patient will be discharged home with followup in approximately 2 weeks for suture removal.  Early active range of motion in the acute postoperative period was discussed and encouraged.  They are to keep the dressing clean, dry, and intact until that time.

## 2024-11-13 NOTE — TRANSFER OF CARE
"Anesthesia Transfer of Care Note    Patient: Shayna Melchor    Procedure(s) Performed: Procedure(s) (LRB):  RELEASE, CARPAL TUNNEL, ENDOSCOPIC - right (Right)    Patient location: PACU    Anesthesia Type: general and regional    Transport from OR: Transported from OR on 100% O2 by closed face mask with adequate spontaneous ventilation    Post pain: adequate analgesia    Post assessment: no apparent anesthetic complications    Post vital signs: stable    Level of consciousness: awake and responds to stimulation    Nausea/Vomiting: no nausea/vomiting    Complications: none    Transfer of care protocol was followed    Last vitals: Visit Vitals  /74 (BP Location: Left arm, Patient Position: Lying)   Pulse 70   Temp 36.6 °C (97.9 °F) (Oral)   Resp 18   Ht 5' 5" (1.651 m)   Wt 108.9 kg (240 lb)   LMP 10/01/2024 (Approximate)   SpO2 97%   Breastfeeding No   BMI 39.94 kg/m²     "

## 2024-11-13 NOTE — ANESTHESIA PREPROCEDURE EVALUATION
[facility] [place]  Pre-operative Anesthetic Evaluation    Shayna Melchor is a 44 y.o. female here for Procedure(s) (LRB):  RELEASE, CARPAL TUNNEL, ENDOSCOPIC - right (Right)    PMHx:  Patient Active Problem List   Diagnosis    ADHD (attention deficit hyperactivity disorder), combined type    Panic disorder    Hypertension    Post-traumatic stress disorder, chronic    Obstructive sleep apnea of adult    Mixed hyperlipidemia    Asthma in adult, mild intermittent, uncomplicated    Migraine headache    Major depressive disorder, recurrent, mild    Esophageal reflux    Degenerative disc disease, lumbar    Functional diarrhea (hx of multiple bouts of cdiff)    Class 3 severe obesity due to excess calories with serious comorbidity and body mass index (BMI) of 40.0 to 44.9 in adult    Decreased range of motion of trunk and back    Decreased strength of trunk and back    Postural imbalance    Dysmenorrhea    S/P TLH/BS    Carpal tunnel syndrome of right wrist       Echocardiogram (if on file):  Results for orders placed during the hospital encounter of 09/25/24    Echo    Interpretation Summary    Left Ventricle: The left ventricle is normal in size. Normal wall thickness. There is concentric remodeling. There is normal systolic function with a visually estimated ejection fraction of 60 - 65%. There is normal diastolic function.    Right Ventricle: The right ventricular cavity size is at the upper limit of normal in size. Wall thickness is normal. Systolic function is normal.    Pulmonary Artery: Pulmonary artery pressure could not be estimated due to a lack of tricuspid valve regurgitation.  However, the RVOT acceleration time is at least 110ms, consistent with normal PA pressures.    IVC/SVC: Normal venous pressure at 3 mmHg.      Allergies:  Review of patient's allergies indicates:   Allergen Reactions    Benadryl allergy-sinus Palpitations     tachycardia    Ciprofloxacin Hives, Shortness Of Breath and Other (See  Comments)     hives    Diphenhydramine Shortness Of Breath, Palpitations and Other (See Comments)     Other Reaction(s): Other        Fish containing products Other (See Comments), Anaphylaxis, Hives and Shortness Of Breath     White fish    Any type of fish.    Latex, natural rubber Hives    Nickel Hives and Rash       Home Medications:  Current Outpatient Medications   Medication Instructions    acetaminophen (TYLENOL) 1,000 mg, Oral, Every 8 hours PRN    acetaminophen (TYLENOL) 650 mg, Every 8 hours    albuterol (PROAIR HFA) 90 mcg/actuation inhaler     azelastine (ASTELIN) 274 mcg, Nasal, 2 times daily    busPIRone (BUSPAR) 5 mg, Oral, Daily    carvediloL (COREG) 6.25 mg, Oral, 2 times daily with meals    clonazePAM (KLONOPIN) 1 mg, Oral, Nightly    EPINEPHrine (EPIPEN 2-TRINITY) 0.3 mg, Intramuscular, Once    esomeprazole (NEXIUM) 40 mg, Before breakfast    famotidine (PEPCID) 20 mg, Oral, Nightly    hyoscyamine (LEVSIN) 0.125 mg Subl     ibuprofen (ADVIL,MOTRIN) 600 mg, Oral, Every 8 hours PRN    montelukast (SINGULAIR) 10 mg, Nightly    omega-3s/dha/epa/algal oil (MEGARED ADVANCED OMEGA-3 ALGAE ORAL) Algae based omega 3.  IWi one po daily.    ondansetron (ZOFRAN) 8 MG tablet TK 1 T PO BID PRN    telmisartan (MICARDIS) 80 mg, Oral, Daily    traMADoL (ULTRAM) 50 mg, Oral, Every 6 hours PRN    vilazodone (VIIBRYD) 20 mg, Oral, Daily       Surgical Hx:  Past Surgical History:   Procedure Laterality Date     SECTION  2015, 2017    LAPAROSCOPIC SALPINGECTOMY Right 10/23/2024    Procedure: SALPINGECTOMY, LAPAROSCOPIC;  Surgeon: Rima Vann DO;  Location: [facility] OR;  Service: OB/GYN;  Laterality: Right;    LAPAROSCOPIC TOTAL HYSTERECTOMY N/A 10/23/2024    Procedure: HYSTERECTOMY, TOTAL, LAPAROSCOPIC;  Surgeon: Rima Vann DO;  Location: [facility] OR;  Service: OB/GYN;  Laterality: N/A;    SINUS SURGERY  2009    deviated septum    TUBAL LIGATION  2017       Tobacco/EtOH:  Social History  "    Tobacco Use    Smoking status: Never    Smokeless tobacco: Never   Substance Use Topics    Alcohol use: Not Currently     Alcohol/week: 2.0 standard drinks of alcohol     Types: 2 Glasses of wine per week       Labs:  CBC: No results for input(s): "WBC", "RBC", "HGB", "HCT", "PLT", "MCV", "MCH", "MCHC" in the last 72 hours.    CMP: No results for input(s): "NA", "K", "CL", "CO2", "BUN", "CREATININE", "GLU", "MG", "PHOS", "CALCIUM", "ALBUMIN", "PROT", "ALKPHOS", "ALT", "AST", "BILITOT" in the last 72 hours.    Coags: No results for input(s): "PT", "INR", "PROTIME", "APTT" in the last 72 hours.    EKG:  Results for orders placed or performed during the hospital encounter of 08/12/24   EKG 12-lead    Collection Time: 08/12/24 10:28 AM   Result Value Ref Range    QRS Duration 72 ms    OHS QTC Calculation 441 ms    Narrative    Test Reason : R07.9,    Vent. Rate : 100 BPM     Atrial Rate : 100 BPM     P-R Int : 174 ms          QRS Dur : 072 ms      QT Int : 342 ms       P-R-T Axes : 025 062 013 degrees     QTc Int : 441 ms    Normal sinus rhythm  Cannot rule out Anterior infarct ,age undetermined  Abnormal ECG    Confirmed by Herbert Tirado MD ([id number]) on 8/12/2024 5:17:24 PM    Referred By: [email or web address]   SELF           Confirmed By:Herbert Tirado MD       Diagnostic Studies (pertinent only):        Pre-op Assessment    I have reviewed the Patient Summary Reports.     I have reviewed the Nursing Notes. I have reviewed the NPO Status.   I have reviewed the Medications.     Review of Systems  Anesthesia Hx:  No problems with previous Anesthesia   History of prior surgery of interest to airway management or planning:            Denies Personal Hx of Anesthesia complications.                    Hematology/Oncology:  Hematology Normal   Oncology Normal                                   EENT/Dental:  EENT/Dental Normal           Cardiovascular:     Hypertension                                    Hypertension       "   Pulmonary:    Asthma    Sleep Apnea   Asthma:    Obstructive Sleep Apnea (EHSAN).           Hepatic/GI:     GERD         Gerd          Musculoskeletal:  Arthritis        Arthritis          Neurological:    Neuromuscular Disease,  Headaches      Dx of Headaches   Arthritis                         Neuromuscular Disease   Endocrine:  Endocrine Normal          Obesity / BMI > 30  Psych:  Psychiatric History                  Physical Exam  General: Well nourished, Cooperative, Alert and Oriented    Airway:  Mallampati: II / I  Mouth Opening: Normal  TM Distance: Normal  Tongue: Normal  Neck ROM: Normal ROM    Dental:  Intact    Chest/Lungs:  Normal Respiratory Rate    Heart:  Rate: Normal        Anesthesia Plan  Type of Anesthesia, risks & benefits discussed:    Anesthesia Type: Regional, Gen Natural Airway  Intra-op Monitoring Plan: Standard ASA Monitors  Post Op Pain Control Plan: multimodal analgesia and IV/PO Opioids PRN  Induction:  IV  Informed Consent: Informed consent signed with the Patient and all parties understand the risks and agree with anesthesia plan.  All questions answered.   ASA Score: 3  Day of Surgery Review of History & Physical: H&P Update referred to the surgeon/provider.    Ready For Surgery From Anesthesia Perspective.     .

## 2024-11-13 NOTE — BRIEF OP NOTE
[facility] - Surgery (Hospital)  Brief Operative Note    Surgery Date: 11/13/2024     Surgeons and Role:     * Jamey Mccord MD - Primary    Assisting Surgeon: None    Pre-op Diagnosis:  Carpal tunnel syndrome of right wrist [G56.01]    Post-op Diagnosis:  Post-Op Diagnosis Codes:     * Carpal tunnel syndrome of right wrist [G56.01]    Procedure(s) (LRB):  RELEASE, CARPAL TUNNEL, ENDOSCOPIC - right (Right)    Anesthesia: Regional    Operative Findings: see op note    Estimated Blood Loss: * No values recorded between 11/13/2024  8:48 AM and 11/13/2024  9:03 AM *         Specimens:   Specimen (24h ago, onward)      None              Discharge Note    OUTCOME: Patient tolerated treatment/procedure well without complication and is now ready for discharge.    DISPOSITION: Home or Self Care    FINAL DIAGNOSIS:  Carpal tunnel syndrome of right wrist    FOLLOWUP: In clinic    DISCHARGE INSTRUCTIONS:    Discharge Procedure Orders   Diet general     Call MD for:  temperature >100.4     Call MD for:  persistent nausea and vomiting     Call MD for:  severe uncontrolled pain     Call MD for:  difficulty breathing, headache or visual disturbances     Call MD for:  redness, tenderness, or signs of infection (pain, swelling, redness, odor or green/yellow discharge around incision site)     Call MD for:  hives     Call MD for:  persistent dizziness or light-headedness     Call MD for:  extreme fatigue     Lifting restrictions   Order Comments: Do not lift more than the weight of a coffee cup with operative extremity until your post-op visit at Dr. Mccord's clinic. Gentle range of motion of fingers encouraged. Can discontinue sling once arm wakes up from anesthesia.     No driving, operating heavy equipment or signing legal documents while taking pain medication.     Leave dressing on - Keep it clean, dry, and intact until clinic visit

## 2024-11-16 ENCOUNTER — PATIENT MESSAGE (OUTPATIENT)
Dept: GASTROENTEROLOGY | Facility: CLINIC | Age: 44
End: 2024-11-16
Payer: OTHER GOVERNMENT

## 2024-11-18 DIAGNOSIS — R10.9 ABDOMINAL CRAMPING: Primary | ICD-10-CM

## 2024-11-18 RX ORDER — HYOSCYAMINE SULFATE 0.12 MG/1
0.12 TABLET SUBLINGUAL EVERY 4 HOURS PRN
Qty: 120 TABLET | Refills: 1 | Status: SHIPPED | OUTPATIENT
Start: 2024-11-18 | End: 2025-02-16

## 2024-11-21 NOTE — PROGRESS NOTES
"The patient location is: [facility]  The chief complaint leading to consultation is: f/u    Visit type: audiovisual    Face to Face time with patient: 16  22 minutes of total time spent on the encounter, which includes face to face time and non-face to face time preparing to see the patient (eg, review of tests), Obtaining and/or reviewing separately obtained history, Documenting clinical information in the electronic or other health record, Independently interpreting results (not separately reported) and communicating results to the patient/family/caregiver, or Care coordination (not separately reported).         Each patient to whom he or she provides medical services by telemedicine is:  (1) informed of the relationship between the physician and patient and the respective role of any other health care provider with respect to management of the patient; and (2) notified that he or she may decline to receive medical services by telemedicine and may withdraw from such care at any time.    Notes:     Outpatient Psychiatry Follow-Up Visit (PA)    11/21/2024    Clinical Status of Patient:  Outpatient (Ambulatory)    Chief Complaint:  Shayna Melchor is a 44 y.o. female who presents today for follow-up of depression and anxiety.  Met with patient.     Current Medications:   Viibryd 20 mg  Buspar 5 mg qhs  Clonazepam 1 mg qhs    Interval History and Content of Current Session:  Patient seen and chart reviewed. Last seen on 8/9/2024    Patient has a psychiatric history of: MDD, anxiety    Pt reports for follow up today after increasing to viibryd 20 mg  She is splitting the tablet in half, for 10 mg qam and 10 mg qhs, reports ASE when taking 20 mg whole.     Patient reports she's been doing "okay" and that the higher dose is "good, I notice a difference."    Her mood has been "pretty good actually".  She denies any depression at this time, reports improvement in tearfulness.   She endorses a sense of dread " "returning to work- has been out post op.    Her anxiety has been "much better".  She continues to endorse some irritation, but improved    She has been home recovering from hysterectomy. She is feeling "really good" post op, resolution of pelvic pain.   She reports feeling better with new BP medicine.     She denies ASE with vybrid when taking 10 mg BID. When taking 20 mg at once, experienced some SOB    She is sleeping okay, notes taking longer time to fall asleep.She continues to take clonazepam 1 mg qhs.   Appetite has been good.     She reports current medications are going well, no med adjustments necessary      Psychotherapy:  Target symptoms: depression, anxiety   Why chosen therapy is appropriate versus another modality: relevant to diagnosis  Outcome monitoring methods: self-report, observation  Therapeutic intervention type: supportive psychotherapy  Topics discussed/themes: symptom recognition  The patient's response to the intervention is accepting. The patient's progress toward treatment goals is good.   Duration of intervention: 10 minutes.    Review of Systems   PSYCHIATRIC: Pertinant items are noted in the narrative.    Past Medication Trials:  trazodone, ambien, ativan, restoril, lunesta, belsomra, and remeron for sleep    Remeron- weight gain  Lexapro- increased anxiety  Zoloft- worked, then stopped  Wellbutirn- worsened anxiety  Cymbalta-   Pristiq-   Paxil- weight gain  Trazodone-   Ambien-   Lorazepam  Tenazepam  Lunesta  Belsomra  States "SNRI- "make me feel like I have the flu"      Past Medical, Family and Social History: The patient's past medical, family and social history have been reviewed and updated as appropriate within the electronic medical record - see encounter notes.    Compliance: yes    Side effects: None    Risk Parameters:  Patient reports no suicidal ideation  Patient reports no homicidal ideation  Patient reports no self-injurious behavior  Patient reports no violent " behavior    Exam (detailed: at least 9 elements; comprehensive: all 15 elements)   Constitutional  Vitals:  Most recent vital signs, dated less than 90 days prior to this appointment, were reviewed.   There were no vitals filed for this visit.     General:  unremarkable, age appropriate, casually dressed, neatly groomed     Musculoskeletal  Muscle Strength/Tone:  not examined   Gait & Station:  virtual     Psychiatric  Speech:  no latency; no press   Mood & Affect:  steady  congruent and appropriate   Thought Process:  normal and logical   Associations:  intact   Thought Content:  normal, no suicidality, no homicidality, delusions, or paranoia   Insight:  intact   Judgement: behavior is adequate to circumstances   Orientation:  grossly intact   Memory: intact for content of interview   Language: grossly intact   Attention Span & Concentration:  able to focus   Fund of Knowledge:  intact and appropriate to age and level of education     Assessment and Diagnosis   Status/Progress: Based on the examination today, the patient's problem(s) is/are improved.  New problems have not been presented today.   Lack of compliance are not complicating management of the primary condition.  There are no active rule-out diagnoses for this patient at this time.     General Impression: Patient is a 44 year old female with a psychiatric history of recurrent, moderate MDD, JULIET presenting after switching from prozac to viibryd 10 mg. Patient reports benefit with current medications, viibryd 20 mg, buspar 5 mg, and clonazepam 1 mg qhs. She is splitting viibryd to 10 mg BID to resolve ASE.     Pt is stable, doing well.     No diagnosis found.        Intervention/Counseling/Treatment Plan   Medication Management: Continue current medications. The risks and benefits of medication were discussed with the patient.  Continue viibryd 10 mg BID mg daily  Continue buspar 5 mg daily  Continue clonazepam 1 mg qhs  Informed pt of the risks of  continuous Benzodiazepine use including tolerance, dependence and withdrawals that may be life threatening upon abrupt cessation. Also advised not to take Benzodiazepines with Opiates or other sedatives and also not to drive or operate heavy machinery while using Benzodiazepines.   Discussed diagnosis, risk and benefits of proposed treatment above vs alternative treatment vs no treatment, and potential side effects of these treatments, and the inherent unpredictability of individual responses to these treatments. The patient expresses understanding and gives informed consent to pursue treatment at this time, believing that the potential benefits outweigh the potential risks. Patient has no other questions. Risks/adverse effects at this time include but are not limited to: GI side effects, sexual dysfunction, activation vs sedation, triggering of suicidal ideation, and serotonin syndrome.   Patient voices understanding and agreement with this plan  Encouraged patient to keep future appointments  Instruct patient to call or message with questions  In the event of an emergency, including suicidal ideation, patient was advised to go to the emergency room      Return to Clinic: 3 months    Amber Barbosa PA-C

## 2024-11-22 ENCOUNTER — OFFICE VISIT (OUTPATIENT)
Dept: PSYCHIATRY | Facility: CLINIC | Age: 44
End: 2024-11-22
Payer: OTHER GOVERNMENT

## 2024-11-22 DIAGNOSIS — F41.1 GENERALIZED ANXIETY DISORDER: Primary | ICD-10-CM

## 2024-11-22 DIAGNOSIS — G47.00 INSOMNIA, UNSPECIFIED TYPE: ICD-10-CM

## 2024-11-22 DIAGNOSIS — F33.41 RECURRENT MAJOR DEPRESSIVE DISORDER, IN PARTIAL REMISSION: ICD-10-CM

## 2024-11-22 RX ORDER — VILAZODONE HYDROCHLORIDE 10 MG/1
10 TABLET ORAL 2 TIMES DAILY
Qty: 60 TABLET | Refills: 2 | Status: SHIPPED | OUTPATIENT
Start: 2024-11-22 | End: 2025-02-20

## 2024-12-03 ENCOUNTER — OFFICE VISIT (OUTPATIENT)
Dept: ORTHOPEDICS | Facility: CLINIC | Age: 44
End: 2024-12-03
Payer: OTHER GOVERNMENT

## 2024-12-03 DIAGNOSIS — Z98.890 POST-OPERATIVE STATE: Primary | ICD-10-CM

## 2024-12-03 DIAGNOSIS — G56.01 CARPAL TUNNEL SYNDROME OF RIGHT WRIST: ICD-10-CM

## 2024-12-03 PROCEDURE — 99214 OFFICE O/P EST MOD 30 MIN: CPT | Mod: PBBFAC

## 2024-12-03 PROCEDURE — 99999 PR PBB SHADOW E&M-EST. PATIENT-LVL IV: CPT | Mod: PBBFAC,,,

## 2024-12-03 NOTE — PROGRESS NOTES
Dr. Mccord is the supervising physician for this encounter/patient    Shayna Melchor presents for post-operative evaluation.  The patient is now approximately 3 weeks s/p right eCTR with Dr Mccord on 11/13/24.  Overall the patient reports doing well, and she notes a 2/10 pain.  She notes her symptoms of numbness and tingling are completely resolved.  The patient is taking ibuprofen for post operative pain control. Patient admits to improving range of motion. She is sore with wrist extension. She denies fevers, chills, night sweats, drainage, erythema, and warmth from the wound.     PE:    AA&O x 4.  NAD  HEENT:  NCAT, sclera nonicteric  Lungs:  Respirations are equal and unlabored.  CV:  2+ bilateral upper and lower extremity pulses.  MSK: The wound is healing well with no signs of erythema or warmth.  There is no drainage.  No clinical signs or symptoms of infection are present.  Sutures discontinued today.  Steri-Strips applied.  She is able to make a full composite fist to the right hand.  Wrist range of motion is mildly restricted due to postoperative stiffness and soreness; especially upon wrist extension.  She is neurovascularly intact to the right upper extremity.    A/P: Status post above, doing well  1) Continue with weight bearing as tolerated.  2) Continue active and passive range of motion exercises. OT orders placed today for aggressive wrist ROM.   3) All sutures removed today. Wound care and signs of infection discussed.  Begin scar massage with Mederma, vitamin-E oil, or cocoa butter today.  4) Follow up in approximately 4-6 weeks for range-of-motion re-evaluation or sooner for any problems.  5) Call with any questions/concerns in the interim       Stefanie Rogel PA-C

## 2024-12-04 ENCOUNTER — OFFICE VISIT (OUTPATIENT)
Dept: OBSTETRICS AND GYNECOLOGY | Facility: CLINIC | Age: 44
End: 2024-12-04
Attending: OBSTETRICS & GYNECOLOGY
Payer: OTHER GOVERNMENT

## 2024-12-04 VITALS
SYSTOLIC BLOOD PRESSURE: 120 MMHG | BODY MASS INDEX: 40.77 KG/M2 | DIASTOLIC BLOOD PRESSURE: 86 MMHG | WEIGHT: 244.69 LBS | HEIGHT: 65 IN

## 2024-12-04 DIAGNOSIS — Z90.710 STATUS POST HYSTERECTOMY: Primary | ICD-10-CM

## 2024-12-04 PROCEDURE — 99214 OFFICE O/P EST MOD 30 MIN: CPT | Mod: PBBFAC | Performed by: OBSTETRICS & GYNECOLOGY

## 2024-12-04 PROCEDURE — 99024 POSTOP FOLLOW-UP VISIT: CPT | Mod: ,,, | Performed by: OBSTETRICS & GYNECOLOGY

## 2024-12-04 PROCEDURE — 99999 PR PBB SHADOW E&M-EST. PATIENT-LVL IV: CPT | Mod: PBBFAC,,, | Performed by: OBSTETRICS & GYNECOLOGY

## 2024-12-05 ENCOUNTER — PATIENT MESSAGE (OUTPATIENT)
Dept: REHABILITATION | Facility: OTHER | Age: 44
End: 2024-12-05
Payer: OTHER GOVERNMENT

## 2024-12-11 NOTE — PLAN OF CARE
[facility] Occupational Therapy  Initial Evaluation     Date: 12/12/2024  Patient: Shayna Melchor  Chart Number: [id number]    Therapy Diagnosis:   1. Right wrist pain        2. Carpal tunnel syndrome of right wrist  Ambulatory referral/consult to Physical/Occupational Therapy      3. Decreased  strength of right hand          Medical Diagnosis: G56.01 (ICD-10-CM) - Carpal tunnel syndrome of right wrist    Referring Physician: Stefanie Rogel PA-C  Physician Orders: Eval and Treat  Note:   3 weeks s/p right eCTR with Dr Mccord on 11/13/24. Please treat and evaluate with right wrist exercises.  Date of Return to MD: 1/16/24    Date of Surgery: 11/13/24  Endoscopic Right sided carpal tunnel release    Evaluation Date: 12/12/2024  Authorization Period: 12/12/24 - 12/31/24  Plan of Care Expiration: 3/6/25  Visit #/ Visits Authorized: 1 of 1  FOTO Completion: Initial eval (12/12/2024)  FOTO #2:  FOTO #3:    Time In: 2:04 pm  Time Out: 2:45 pm  Total Billable Time: 41 min    Precautions: Standard    Subjective     History of Current Condition: Shayna Melchor is a 44 y.o. year old Right hand dominant female who underwent Right endoscopic CTR on 11/13/24.She reports pillar pain and scar tenderness since.  Shayna Melchor is referred to Occupational Therapy for evaluation and treatment. Patient presents today alone.    Falls: none    Involved Side: Right  Dominant Side: Right    Date of Onset: sx 11/13/24  Imaging: NA  Previous Therapy: NA    Pain:  Functional Pain Scale Rating 0-10:   Current: 0/10  At Best: 0/10  At Worst: 4/10    Location: Right volar wrist  Description: tender, achy  Aggravating Factors: wrist ROM, weightbearing  Easing Factors: rest, ibuprofen, heat    Functional Limitations/Social History:  Prior Level of Function: Independent with all ADLs/IADLs    Current Level of Function:   ADLs: remains independent, no difficulty  IADLs: able to perform all tasks but with  difficulty, such as opening jars  Leisure: read, cook, piano  Driving: Yes    Occupation:  pediatric therapist  Working presently: employed  Duties: typing, handwriting    Patient's Goals for Therapy: reduce stiffness, increase ROM    Past Medical History/Physical Systems Review:   Shayna Melchor  has a past medical history of ADHD (attention deficit hyperactivity disorder), Depression, Hyperlipidemia, Hypertension, EHSAN (obstructive sleep apnea), and PTSD (post-traumatic stress disorder).    Shayna Melchor  has a past surgical history that includes  section (2015, 2017); Tubal ligation (2017); Sinus surgery (); Laparoscopic total hysterectomy (N/A, 10/23/2024); Laparoscopic salpingectomy (Right, 10/23/2024); and Endoscopic carpal tunnel release (Right, 2024).    Shayna Rutherford has a current medication list which includes the following prescription(s): acetaminophen, albuterol, azelastine, carvedilol, clonazepam, epinephrine, esomeprazole, famotidine, hyoscyamine, ibuprofen, montelukast, omega-3s/dha/epa/algal oil, ondansetron, telmisartan, and vilazodone.    Review of patient's allergies indicates:   Allergen Reactions    Benadryl allergy-sinus Palpitations     tachycardia    Ciprofloxacin Hives, Shortness Of Breath and Other (See Comments)     hives    Diphenhydramine Shortness Of Breath, Palpitations and Other (See Comments)     Other Reaction(s): Other        Fish containing products Other (See Comments), Anaphylaxis, Hives and Shortness Of Breath     White fish    Any type of fish.    Latex, natural rubber Hives    Nickel Hives and Rash          Objective     Mental status: alert, oriented x3    Observation/Appearance:   Small scar about volar wrist from endoscopic release, thickened    Sensation: Patient reports complete relief of numbness/tingling since surgery      ELBOW, WRIST RANGE OF MOTION:   Measured in degrees of active motion with goniometer    Right  12/12/2024 Left  12/12/2024   Elbow Extension/Flexion WNL WNL   Pronation/Supination WNL WNL   Wrist Extension/Flexion 60/55 70/75   Ulnar/Radial Deviation 30/25 30/30       STRENGTH: (Measured in pounds using a Dynamometer and pinch meter)   Right  12/12/2024 Left  12/12/2024    Setting 2 40, 42, 48 70, 56, 52    Average 43 59   Key 16.5 18   3 Pt 16.7 18.7       Intake Outcome Measure for FOTO Initial Evaluation Survey    Therapist reviewed FOTO scores for Shayna Melchor on 12/12/2024.   FOTO documents entered into [employer] - see Media section.    Intake Score: 61%         Treatment     Treatment Time In: 2:20 pm  Treatment Time Out: 2:45 pm  Total Treatment time separate from Evaluation time: 25 min    Direct contact modalities after being cleared for contraindications: Ultrasound (100%, 3 MHz, 1.0 W/cm2, 8 minutes) with additional 2 minutes of set-up time, used for soft tissue preparation prior to treatment activities applied today to the volar wrist scar.      Olga received the following manual therapy techniques for 5 minutes:   - STM and vibration to volar wrist scar and surrounding area      Olga performed therapeutic exercises for 10 minutes including:  - AROM wrist flex/ext, UD/RD (10 ea)  - PROM comp wrist ext (3 x 30 sec)  - Tabletop weightbearing onto rolled up towel (10 reps, 10 sec hold)      Patient Education and Home Exercises     Patient/Family Education Provided:   - Role of OT, goals for OT, scheduling/cancellations - pt verbalized understanding. Discussed insurance limitations with patient.    Written Home Exercises Provided: yes.  Exercises were reviewed and Olga was able to demonstrate them prior to the end of the session.  Olga demonstrated good  understanding of the education provided. See EMR under Patient Instructions for exercises provided during therapy sessions.     Pt was advised to perform these exercises free of pain, and to stop performing them if pain  occurs.      Assessment     Shayna Melchor is a 44 y.o. female referred to outpatient occupational therapy and presents with a medical diagnosis of G56.01 (ICD-10-CM) - Carpal tunnel syndrome of right wrist. Patient presents with pain, thickened scar and decreased scar mobility, decreased ROM and strength interfering with functional use in daily activities.  Shayna Melchor will benefit from continued skilled OT services to progress with above deficits and facilitate increased functional use of RUE.    Following medical record review it is determined that pt will benefit from occupational therapy services in order to maximize pain free and/or functional use of right wrist. The following goals were discussed with the patient and patient is in agreement with them as to be addressed in the treatment plan. The patient's rehab potential is Good.     Anticipated barriers to occupational therapy: none    Plan of care discussed with patient: Yes    Patient's spiritual, cultural and educational needs considered and patient is agreeable to the plan of care and goals as stated below:     Medical Necessity is demonstrated by the following  Occupational Profile/History  Co-morbidities and personal factors that may impact the plan of care [x] LOW: Brief chart review  [] MODERATE: Expanded chart review   [] HIGH: Extensive chart review    Moderate / High Support Documentation:       Examination  Performance deficits relating to physical, cognitive or psychosocial skills that result in activity limitations and/or participation restrictions  [] LOW: addressing 1-3 Performance deficits  [x] MODERATE: 3-5 Performance deficits  [] HIGH: 5+ Performance deficits (please support below)    Moderate / High Support Documentation:    Physical:  Joint Mobility  Skin Integrity/Scar Formation   Strength  Pain    Cognitive:  No Deficits    Psychosocial:    Habits  Routines     Treatment Options [] LOW: Limited options  [x]  MODERATE: Several options  [] HIGH: Multiple options      Decision Making/ Complexity Score: low         The following goals were discussed with the patient and patient is in agreement with them as to be addressed in the treatment plan.     Long Term Goals (to be met by discharge):  Date Goal Met:     1.) Shayna Melchor will demonstrate significantly improved functional performance from re-assessment as measured by a FOTO Intake score of more than 71.    Goal Status:   In progress    2.) Shayna Melchor will return to near to prior level of function for ADLs and household management reporting independence or modified independence.    Goal Status:   In progress    3. Shayna Melchor will report pain 2 out of 10 at worst to increase functional use of affected hand for work and leisure tasks.    Goal Status:   In progress     Short Term Goals (to be met by 1/15/25):  Date Goal Met:     Shayna Melchor will be independent with home exercise program with written instructions.    Goal Status:   In progress    Shayna Melchor will demonstrate 65 degrees of wrist flex to improve functional performance in ADLs/work/leisure tasks.    Goal Status:   In progress    Shayna Melchor will demonstrate 53 lbs of  strength to improve functional grasp for ADLs/work/leisure tasks.    Goal Status:   In progress    Shayna Melchor will demonstrate the ability to complete ADL/IADL tasks with 4/10 pain.    Goal Status:   In progress       Plan     Pt to be treated by Occupational Therapy 1 times per week for 12 weeks during the certification period from 12/12/2024 to 3/6/25 to achieve the established goals.     Treatment to include: Paraffin, Fluidotherapy, Manual therapy/joint mobilizations, Modalities for pain management, US 3 mhz, Therapeutic exercises/activities., Iontophoresis with 2.0 cc Dexamethasone, Strengthening, Edema Control, Scar Management, Electrical Modalities, and Energy  Conservation, as well as any other treatments deemed necessary based on the patient's needs or progress.       Judith Corbett, OTR/L, CHT

## 2024-12-12 ENCOUNTER — CLINICAL SUPPORT (OUTPATIENT)
Dept: REHABILITATION | Facility: OTHER | Age: 44
End: 2024-12-12
Payer: OTHER GOVERNMENT

## 2024-12-12 DIAGNOSIS — M25.531 RIGHT WRIST PAIN: Primary | ICD-10-CM

## 2024-12-12 DIAGNOSIS — G56.01 CARPAL TUNNEL SYNDROME OF RIGHT WRIST: ICD-10-CM

## 2024-12-12 DIAGNOSIS — R29.898 DECREASED GRIP STRENGTH OF RIGHT HAND: ICD-10-CM

## 2024-12-12 PROCEDURE — 97165 OT EVAL LOW COMPLEX 30 MIN: CPT | Mod: PN

## 2024-12-12 PROCEDURE — 97110 THERAPEUTIC EXERCISES: CPT | Mod: PN

## 2024-12-12 PROCEDURE — 97035 APP MDLTY 1+ULTRASOUND EA 15: CPT | Mod: PN

## 2024-12-12 NOTE — PATIENT INSTRUCTIONS
[facility], OCCUPATIONAL THERAPY  HOME EXERCISE PROGRAM      Complete scar massage, 3-5 minutes, 3 times a day:               Use lotion and apply to scar. Message the scar in all directions with enough pressure to move the skin.      Complete the following exercises for 10 repetitions, 3x/day:       AROM: Wrist Flexion / Extension               Bend your wrist forward and back as far as possible.  Perform 10 reps with fingers straight  Perform 10 reps with fingers closed in a fist          AROM: Wrist Radial / Ulnar Deviation  Position hand flat on the table.  Bend your wrist from side to side as far as possible.  (Sliding side to side like [employer] wipers)      Complete the following stretch for 3 repetitions, hold 30 seconds each time:     Composite wrist extension stretch:  -Hold arm straight out in front of you, elbow extended, and palm up  -Use other hand to bend the wrist back  -Make sure to bend at the wrist, not at the finger tips! Avoid hyperextension of the fingers  -Hold for 30 seconds. Repeat 3x.          Composite Extension (Passive Flexor Stretch)  Sitting with elbows on table and palms together, slowly lower wrists toward table until stretch is felt. Be sure to keep palms together throughout stretch.     Hold for 30 seconds. Repeat 3x.    Therapist: Judith Corbett, ALE/L, CHT     Copyright © [facility]. All rights reserved.

## 2024-12-13 PROBLEM — M25.531 RIGHT WRIST PAIN: Status: ACTIVE | Noted: 2024-12-13

## 2024-12-13 PROBLEM — R29.898 DECREASED GRIP STRENGTH OF RIGHT HAND: Status: ACTIVE | Noted: 2024-12-13

## 2024-12-20 ENCOUNTER — CLINICAL SUPPORT (OUTPATIENT)
Dept: REHABILITATION | Facility: OTHER | Age: 44
End: 2024-12-20
Payer: OTHER GOVERNMENT

## 2024-12-20 DIAGNOSIS — M25.531 RIGHT WRIST PAIN: Primary | ICD-10-CM

## 2024-12-20 DIAGNOSIS — R29.898 DECREASED GRIP STRENGTH OF RIGHT HAND: ICD-10-CM

## 2024-12-20 PROCEDURE — 97035 APP MDLTY 1+ULTRASOUND EA 15: CPT | Mod: PN

## 2024-12-20 PROCEDURE — 97140 MANUAL THERAPY 1/> REGIONS: CPT | Mod: PN

## 2024-12-20 PROCEDURE — 97110 THERAPEUTIC EXERCISES: CPT | Mod: PN

## 2024-12-20 NOTE — PATIENT INSTRUCTIONS
"[facility] OCCUPATIONAL THERAPY  HOME EXERCISE PROGRAM     Complete the following strengthening program 1x/day.        /Squeezes  - Squeeze putty, gripping for a max of 3 - 5 min          Resisted Lumbrical   Bending only at large knuckles, press putty down against thumb. Keep fingertips straight.   - 20 reps        Three Point Pinches  - Roll putty into a log  - Pinch along with the thumb, index and middle fingers.  - Make sure to keep an "O"  - 20-30 pinches      Complete the following 3x/day.        MEDIAN NERVE: Flossing I  With right elbow bent and palm facing up as if holding a tray, head tilted away. Simultaneously straighten arm and tilt head toward involved shoulder.    Perform 20 reps, 3x/day    Copyright © I. All rights reserved.     "

## 2024-12-20 NOTE — PROGRESS NOTES
Occupational Therapy Daily Treatment Note     Date: 12/20/2024  Name: Shayna Melchor  Clinic Number: [id number]    Therapy Diagnosis:   1. Right wrist pain        2. Decreased  strength of right hand          Medical Diagnosis: G56.01 (ICD-10-CM) - Carpal tunnel syndrome of right wrist     Referring Physician: Stefanie Rogel PA-C  Physician Orders: Eval and Treat  Note:   3 weeks s/p right eCTR with Dr Mccord on 11/13/24. Please treat and evaluate with right wrist exercises.  Date of Return to MD: 1/16/24     Date of Surgery: 11/13/24  Endoscopic Right sided carpal tunnel release     Evaluation Date: 12/12/2024  Authorization Period: 12/12/24 - 12/31/24  Plan of Care Expiration: 3/6/25  Visit #/ Visits Authorized: 2 of 2  FOTO Completion: Initial eval (12/12/2024)  FOTO #2:  FOTO #3:     Time In: 9:15 am  Time Out: 9:55 am  Total Billable Time: 40 min     Precautions: Standard      Subjective     History of Current Condition: Shayna Melchor is a 44 y.o. year old Right hand dominant female who underwent Right endoscopic CTR on 11/13/24.She reports pillar pain and scar tenderness since.  Shayna Melchor is referred to Occupational Therapy for evaluation and treatment. Patient presents today alone.    Pt reports: stretches are helping, still feels pillar pain and increased tenderness with using mouse  Shayna Melchor was compliant with home exercise program given last session.   Response to previous treatment: first follow up  Functional change: none - too soon    Pain: 2/10  Location: right wrist    Objective     Observation/Appearance:   Small scar about volar wrist from endoscopic release, thickened     Sensation: Patient reports complete relief of numbness/tingling since surgery        ELBOW, WRIST RANGE OF MOTION:   Measured in degrees of active motion with goniometer    Right  12/12/2024 Left  12/12/2024   Elbow Extension/Flexion WNL WNL   Pronation/Supination WNL WNL   Wrist  Extension/Flexion 60/55 70/75   Ulnar/Radial Deviation 30/25 30/30         STRENGTH: (Measured in pounds using a Dynamometer and pinch meter)    Right  12/12/2024 Left  12/12/2024    Setting 2 40, 42, 48 70, 56, 52    Average 43 59   Key 16.5 18   3 Pt 16.7 18.7         Intake Outcome Measure for FOTO Initial Evaluation Survey     Therapist reviewed FOTO scores for Shayna Melchor on 12/12/2024.   FOTO documents entered into [employer] - see Media section.     Intake Score: 61%           Treatment        Direct contact modalities after being cleared for contraindications: Ultrasound (100%, 3 MHz, 1.0 W/cm2, 8 minutes) with additional 2 minutes of set-up time, used for soft tissue preparation prior to treatment activities applied today to the volar wrist scar.        Olga received the following manual therapy techniques for 10 minutes:   - STM and vibration to volar wrist scar and surrounding area  - Cupping to palm and forearm        Olga performed therapeutic exercises for 20 minutes including:  - Provided with soft sponge for resting wrist during mousing  - AROM wrist flex/ext, UD/RD (10 ea)  - Median nerve flossing (20 reps)  - PROM comp wrist ext (3 x 30 sec)  - Isospheres (3 min)  - Pink putty , presses and pinches (30 ea)  - updated HEP and provided handout  - Tabletop weightbearing onto rolled up towel (10 reps, 10 sec hold) - NT      Home Exercises and Education Provided     Education provided:   - Progress towards goals     Written Home Exercises Provided: yes.  Exercises were reviewed and Olga was able to demonstrate them prior to the end of the session.  Olga demonstrated good  understanding of the HEP provided.   .   See EMR under Patient Instructions for exercises provided prior visit.        Assessment     Olga tolerated treatment tasks and progression of strengthening well. Reported sharp pain at times in wrist. Will progress as tolerated.     Olga is progressing well towards her  goals and there are no updates to goals at this time. Pt prognosis is Good. Pt will continue to benefit from skilled outpatient occupational therapy to address the deficits listed in the problem list on initial evaluation provide pt/family education and to maximize pt's level of independence in the home and community environment.     Pt's spiritual, cultural and educational needs considered and pt agreeable to plan of care and goals.    The following goals were discussed with the patient and patient is in agreement with them as to be addressed in the treatment plan.      Long Term Goals (to be met by discharge):  Date Goal Met:       1.) Shayna Melchor will demonstrate significantly improved functional performance from re-assessment as measured by a FOTO Intake score of more than 71.     Goal Status:   In progress     2.) Shayna Melchor will return to near to prior level of function for ADLs and household management reporting independence or modified independence.     Goal Status:   In progress     3. Shayna Melchor will report pain 2 out of 10 at worst to increase functional use of affected hand for work and leisure tasks.     Goal Status:   In progress      Short Term Goals (to be met by 1/15/25):  Date Goal Met:       Shayna Melchor will be independent with home exercise program with written instructions.     Goal Status:   In progress     Shayna Melchor will demonstrate 65 degrees of wrist flex to improve functional performance in ADLs/work/leisure tasks.     Goal Status:   In progress     Shayna Melchor will demonstrate 53 lbs of  strength to improve functional grasp for ADLs/work/leisure tasks.     Goal Status:   In progress     Shayna Melchor will demonstrate the ability to complete ADL/IADL tasks with 4/10 pain.     Goal Status:   In progress        Plan   Continue skilled occupational therapy with individualized plan of care focusing on increasing functional  independence and participation in meaningful daily activities.    Updates/Grading for next session: progress as tolerated      Judith Corbett, OT

## 2024-12-28 DIAGNOSIS — F41.1 GENERALIZED ANXIETY DISORDER: ICD-10-CM

## 2024-12-28 DIAGNOSIS — G47.00 INSOMNIA, UNSPECIFIED TYPE: Primary | ICD-10-CM

## 2024-12-30 RX ORDER — CLONAZEPAM 1 MG/1
1 TABLET ORAL NIGHTLY
Qty: 30 TABLET | Refills: 3 | Status: SHIPPED | OUTPATIENT
Start: 2024-12-30

## 2025-01-02 ENCOUNTER — PATIENT MESSAGE (OUTPATIENT)
Dept: RESEARCH | Facility: OTHER | Age: 45
End: 2025-01-02

## 2025-01-03 ENCOUNTER — CLINICAL SUPPORT (OUTPATIENT)
Dept: REHABILITATION | Facility: OTHER | Age: 45
End: 2025-01-03
Payer: OTHER GOVERNMENT

## 2025-01-03 DIAGNOSIS — M25.531 RIGHT WRIST PAIN: Primary | ICD-10-CM

## 2025-01-03 DIAGNOSIS — R29.898 DECREASED GRIP STRENGTH OF RIGHT HAND: ICD-10-CM

## 2025-01-03 PROCEDURE — 97140 MANUAL THERAPY 1/> REGIONS: CPT | Mod: PN

## 2025-01-03 PROCEDURE — 97110 THERAPEUTIC EXERCISES: CPT | Mod: PN

## 2025-01-03 PROCEDURE — 97035 APP MDLTY 1+ULTRASOUND EA 15: CPT | Mod: PN

## 2025-01-03 PROCEDURE — 97530 THERAPEUTIC ACTIVITIES: CPT | Mod: PN

## 2025-01-03 NOTE — PROGRESS NOTES
Occupational Therapy Daily Treatment Note     Date: 1/3/2025  Name: Shayna Melchor  Clinic Number: [id number]    Therapy Diagnosis:   1. Right wrist pain        2. Decreased  strength of right hand          Medical Diagnosis: G56.01 (ICD-10-CM) - Carpal tunnel syndrome of right wrist     Referring Physician: Stefanie Rogel PA-C  Physician Orders: Eval and Treat  Note:   3 weeks s/p right eCTR with Dr Mccord on 11/13/24. Please treat and evaluate with right wrist exercises.  Date of Return to MD: 1/16/24     Date of Surgery: 11/13/24  Endoscopic Right sided carpal tunnel release     Evaluation Date: 12/12/2024  Authorization Period: 12/12/24 - 12/31/24  Plan of Care Expiration: 3/6/25  Visit #/ Visits Authorized: 3 of 20  FOTO Completion: Initial eval (12/12/2024)  FOTO #2: 1/3/25  FOTO #3:     Time In: 7:45 am  Time Out: 8:25 am  Total Billable Time: 40 min     Precautions: Standard      Subjective     History of Current Condition: Shayna Melchor is a 44 y.o. year old Right hand dominant female who underwent Right endoscopic CTR on 11/13/24.She reports pillar pain and scar tenderness since.  Shayna Melchor is referred to Occupational Therapy for evaluation and treatment. Patient presents today alone.    Pt reports: still feels sensitive about the scar  Shayna Melchor was compliant with home exercise program given last session.   Response to previous treatment: tolerated well  Functional change: none - too soon    Pain: 2/10  Location: right wrist    Objective     Observation/Appearance:   Small scar about volar wrist from endoscopic release, thickened     Sensation: Patient reports complete relief of numbness/tingling since surgery        ELBOW, WRIST RANGE OF MOTION:   Measured in degrees of active motion with goniometer    Right  12/12/2024 Right  1/3/25 Left  12/12/2024   Elbow Extension/Flexion WNL  WNL   Pronation/Supination WNL  WNL   Wrist Extension/Flexion 60/55 65/60 70/75    Ulnar/Radial Deviation 30/25 WNL 30/30         STRENGTH: (Measured in pounds using a Dynamometer and pinch meter)    Right  12/12/2024 Right  1/3/25 Left  12/12/2024    Setting 2 40, 42, 48 33, 44, 46 70, 56, 52    Average 43 41 59   Key 16.5 16.5 18   3 Pt 16.7 13.5 18.7         Intake Outcome Measure for FOTO Initial Evaluation Survey     Therap[facility].   FOTO documents entered into [employer] - see Media section.     Intake Score: 61%  1/3/25: 71%           Treatment        Direct contact modalities after being cleared for contraindications: Ultrasound (100%, 3 MHz, 1.2 W/cm2, 8 minutes) used for soft tissue preparation prior to treatment activities applied today to the volar wrist scar.        Olga received the following manual therapy techniques for 10 minutes:   - STM and vibration to volar wrist scar and surrounding area       Olga performed therapeutic exercises for 11 minutes including:  - Reassessment objectives and FOTO  - Provided with desensitization stick with 4 different textures and performed blending with 1 min each texture  - AROM wrist flex/ext, UD/RD (10 ea)  - Red flexbar twists, pro/sup (3 x 10)  - Median nerve flossing (20 reps)  - PROM comp wrist ext (3 x 30 sec)  - Pink putty , presses and pinches (30 ea) - NT  - Tabletop weightbearing onto rolled up towel (10 reps, 10 sec hold) - NT      Olga participated in dynamic functional therapeutic activities to improve functional performance for 11 minutes, including:  - Isospheres (3 min) - NT  - Green pin 3 pt and key pinch foam  (1 set ea)  - Resistive gripper black 2nd spring foam  (1 set)      Home Exercises and Education Provided     Education provided:   - Progress towards goals     Written Home Exercises Provided: Patient instructed to cont prior HEP.  Exercises were reviewed and Olga was able to demonstrate them prior to the end of the session.  Olga  demonstrated good  understanding of the HEP provided.   .   See EMR under Patient Instructions for exercises provided prior visit.        Assessment     Olga tolerated treatment tasks and progression of strengthening well, requiring rest breaks and reports of fatigue by end of session. She continues to have sensitivity and altered sensation to light touch, provided with desensitization stick today.  remains weak compared to unaffected side. Will progress as tolerated.     Olga is progressing well towards her goals and there are no updates to goals at this time. Pt prognosis is Good. Pt will continue to benefit from skilled outpatient occupational therapy to address the deficits listed in the problem list on initial evaluation provide pt/family education and to maximize pt's level of independence in the home and community environment.     Pt's spiritual, cultural and educational needs considered and pt agreeable to plan of care and goals.    The following goals were discussed with the patient and patient is in agreement with them as to be addressed in the treatment plan.      Long Term Goals (to be met by discharge):  Date Goal Met:      1/3/25 1.) Shayna Melchor will demonstrate significantly improved functional performance from re-assessment as measured by a FOTO Intake score of more than 71.     Goal Status: Met     2.) Shayna Melchor will return to near to prior level of function for ADLs and household management reporting independence or modified independence.     Goal Status:   In progress     3. Shayna Melchor will report pain 2 out of 10 at worst to increase functional use of affected hand for work and leisure tasks.     Goal Status:   In progress      Short Term Goals (to be met by 1/15/25):  Date Goal Met:      1/3/25 Shayna Melchor will be independent with home exercise program with written instructions.     Goal Status:  Met     Shayna Melchor will demonstrate 65  degrees of wrist flex to improve functional performance in ADLs/work/leisure tasks.     Goal Status:   In progress     Shayna Melchor will demonstrate 53 lbs of  strength to improve functional grasp for ADLs/work/leisure tasks.     Goal Status:   In progress    1/3/25 Shayna Melchor will demonstrate the ability to complete ADL/IADL tasks with 4/10 pain.     Goal Status: Met        Plan   Continue skilled occupational therapy with individualized plan of care focusing on increasing functional independence and participation in meaningful daily activities.    Updates/Grading for next session: progress as tolerated      Judith Corbett, OT           Localized Dermabrasion Text: The patient was draped in routine manner.  Localized dermabrasion using 3 x 17 mm wire brush was performed in routine manner to papillary dermis. This spot dermabrasion is being performed to complete skin cancer reconstruction. It also will eliminate the other sun damaged precancerous cells that are known to be part of the regional effect of a lifetime's worth of sun exposure. This localized dermabrasion is therapeutic and should not be considered cosmetic in any regard. Localized Dermabrasion With Wire Brush Text: The patient was draped in routine manner.  Localized dermabrasion using 3 x 17 mm wire brush was performed in routine manner to papillary dermis. This spot dermabrasion is being performed to complete skin cancer reconstruction. It also will eliminate the other sun damaged precancerous cells that are known to be part of the regional effect of a lifetime's worth of sun exposure. This localized dermabrasion is therapeutic and should not be considered cosmetic in any regard.

## 2025-01-07 ENCOUNTER — CLINICAL SUPPORT (OUTPATIENT)
Dept: REHABILITATION | Facility: OTHER | Age: 45
End: 2025-01-07

## 2025-01-07 DIAGNOSIS — M25.531 RIGHT WRIST PAIN: Primary | ICD-10-CM

## 2025-01-07 DIAGNOSIS — R29.898 DECREASED GRIP STRENGTH OF RIGHT HAND: ICD-10-CM

## 2025-01-07 PROCEDURE — 97140 MANUAL THERAPY 1/> REGIONS: CPT | Mod: PN,CO

## 2025-01-07 PROCEDURE — 97110 THERAPEUTIC EXERCISES: CPT | Mod: PN,CO

## 2025-01-07 PROCEDURE — 97035 APP MDLTY 1+ULTRASOUND EA 15: CPT | Mod: PN,CO

## 2025-01-07 NOTE — PROGRESS NOTES
Occupational Therapy Daily Treatment Note     Date: 1/7/2025  Name: Shayna Melchor  Clinic Number: [id number]    Therapy Diagnosis:   1. Right wrist pain        2. Decreased  strength of right hand            Medical Diagnosis: G56.01 (ICD-10-CM) - Carpal tunnel syndrome of right wrist     Referring Physician: Stefanie Rogel PA-C  Physician Orders: Eval and Treat  Note:   3 weeks s/p right eCTR with Dr Mccord on 11/13/24. Please treat and evaluate with right wrist exercises.  Date of Return to MD: 1/16/24     Date of Surgery: 11/13/24  Endoscopic Right sided carpal tunnel release     Evaluation Date: 12/12/2024  Authorization Period: 12/12/24 - 12/31/24  Plan of Care Expiration: 3/6/25  Visit #/ Visits Authorized: 4 of 20  FOTO Completion: Initial eval (12/12/2024)  FOTO #2: 1/3/25  FOTO #3:     Time In: 8:45 am  Time Out: 9:22 am   Total Billable Time: 37 min     Precautions: Standard      Subjective     History of Current Condition: hSayna Melchor is a 44 y.o. year old Right hand dominant female who underwent Right endoscopic CTR on 11/13/24.She reports pillar pain and scar tenderness since.  Shayna Melchor is referred to Occupational Therapy for evaluation and treatment. Patient presents today alone.    Pt reports: still feels sensitive about the scar; has been using desensitization techniques at home. C/o soreness by end of day   Shayna Melchor was compliant with home exercise program given last session.   Response to previous treatment: tolerated well  Functional change: none - too soon    Pain: 0/10  Location: right wrist    Objective     Observation/Appearance:   Small scar about volar wrist from endoscopic release, thickened     Sensation: Patient reports complete relief of numbness/tingling since surgery        ELBOW, WRIST RANGE OF MOTION:   Measured in degrees of active motion with goniometer    Right  12/12/2024 Right  1/3/25 Left  12/12/2024   Elbow Extension/Flexion  WNL  WNL   Pronation/Supination WNL  WNL   Wrist Extension/Flexion 60/55 65/60 70/75   Ulnar/Radial Deviation 30/25 WNL 30/30         STRENGTH: (Measured in pounds using a Dynamometer and pinch meter)    Right  12/12/2024 Right  1/3/25 Left  12/12/2024    Setting 2 40, 42, 48 33, 44, 46 70, 56, 52    Average 43 41 59   Key 16.5 16.5 18   3 Pt 16.7 13.5 18.7         Intake Outcome Measure for FOTO Initial Evaluation Survey     [facility]   FOTO documents entered into [employer] - see Media section.     Intake Score: 61%  1/3/25: 71%           Treatment        Direct contact modalities after being cleared for contraindications: Ultrasound (100%, 3 MHz, 1.2 W/cm2, 8 minutes) used for soft tissue preparation prior to treatment activities applied today to the volar wrist scar.        Olga received the following manual therapy techniques for 10 minutes:   - STM and vibration to volar wrist scar and surrounding area       Olga performed therapeutic exercises for 14 minutes including:  - AROM wrist flex/ext, UD/RD, #1 (10 ea)  - Red flexbar twists, pro/sup (3 x 10)  - Median nerve flossing (20 reps) - NT   - PROM comp wrist ext (3 x 30 sec)  - Pink putty , presses and pinches (30 ea) - NT  - Tabletop weightbearing onto rolled up towel (10 reps, 10 sec hold) - NT  - Red power web x 20 reps     Olga participated in dynamic functional therapeutic activities to improve functional performance for 5 minutes, including:  - Isospheres (3 min) - NT  - Green pin 3 pt and key pinch foam  (1 set ea)  - Resistive gripper black 2nd spring foam  (1 set)- NT      Home Exercises and Education Provided     Education provided:   - Progress towards goals     Written Home Exercises Provided: Patient instructed to cont prior HEP.  Exercises were reviewed and Olga was able to demonstrate them prior to the end of the session.  Olga demonstrated good   understanding of the HEP provided.   .   See EMR under Patient Instructions for exercises provided prior visit.        Assessment     Olga tolerated treatment tasks and progression of strengthening well.  Reports improved tolerance for activities on this date.  She continues to have sensitivity and altered sensation to light touch. Will progress as tolerated.    Olga is progressing well towards her goals and there are no updates to goals at this time. Pt prognosis is Good. Pt will continue to benefit from skilled outpatient occupational therapy to address the deficits listed in the problem list on initial evaluation provide pt/family education and to maximize pt's level of independence in the home and community environment.     Pt's spiritual, cultural and educational needs considered and pt agreeable to plan of care and goals.    The following goals were discussed with the patient and patient is in agreement with them as to be addressed in the treatment plan.      Long Term Goals (to be met by discharge):  Date Goal Met:      1/3/25 1.) Shayna Melchor will demonstrate significantly improved functional performance from re-assessment as measured by a FOTO Intake score of more than 71.     Goal Status: Met     2.) Shayna Melchor will return to near to prior level of function for ADLs and household management reporting independence or modified independence.     Goal Status:   In progress     3. Shayna Melchor will report pain 2 out of 10 at worst to increase functional use of affected hand for work and leisure tasks.     Goal Status:   In progress      Short Term Goals (to be met by 1/15/25):  Date Goal Met:      1/3/25 Shayna Melchor will be independent with home exercise program with written instructions.     Goal Status:  Met     Shayna Melchor will demonstrate 65 degrees of wrist flex to improve functional performance in ADLs/work/leisure tasks.     Goal Status:   In progress      Shayna Melchor will demonstrate 53 lbs of  strength to improve functional grasp for ADLs/work/leisure tasks.     Goal Status:   In progress    1/3/25 Shayna Melchor will demonstrate the ability to complete ADL/IADL tasks with 4/10 pain.     Goal Status: Met        Plan   Continue skilled occupational therapy with individualized plan of care focusing on increasing functional independence and participation in meaningful daily activities.    Updates/Grading for next session: progress as tolerated      SANTOS Childs

## 2025-01-15 ENCOUNTER — PATIENT MESSAGE (OUTPATIENT)
Dept: INTERNAL MEDICINE | Facility: CLINIC | Age: 45
End: 2025-01-15
Payer: OTHER GOVERNMENT

## 2025-01-15 ENCOUNTER — TELEPHONE (OUTPATIENT)
Dept: ORTHOPEDICS | Facility: CLINIC | Age: 45
End: 2025-01-15
Payer: OTHER GOVERNMENT

## 2025-01-15 RX ORDER — ESOMEPRAZOLE MAGNESIUM 40 MG/1
40 CAPSULE, DELAYED RELEASE ORAL
Qty: 90 CAPSULE | Refills: 3 | Status: SHIPPED | OUTPATIENT
Start: 2025-01-15

## 2025-01-15 RX ORDER — MONTELUKAST SODIUM 10 MG/1
10 TABLET ORAL NIGHTLY
Qty: 90 TABLET | Refills: 3 | Status: SHIPPED | OUTPATIENT
Start: 2025-01-15

## 2025-01-15 NOTE — TELEPHONE ENCOUNTER
No care due was identified.  [facility] Embedded Care Due Messages. Reference number: [id number].   1/15/2025 4:04:27 PM CST

## 2025-01-15 NOTE — TELEPHONE ENCOUNTER
Patient requesting refill on   Esomeprazole 40 mg  Montelukast 10 mg  Pt's  LOV with Stefanie Baltazar MD , 8/13/2024  Medication pending

## 2025-01-15 NOTE — TELEPHONE ENCOUNTER
Spoke w/ pt, She's reschedule for 2/4/2025 to see Stefanie.    ----- Message from Palmer sent at 1/15/2025  9:19 AM CST -----  Regarding: Appt  Contact: pt [phone]  Pt is requesting call back to reschedule post op visit for tomorrow 1/16 due to new employment, please call pt @[phone]

## 2025-01-17 NOTE — PROGRESS NOTES
[facility])  Gynecology  H&P     Patient Name: Shayna Melchor  MRN: [id number]  Admission Date: (Not on file)  Primary Care Provider: Stefanie Baltazar MD   Principal Problem: Dysmenorrhea     Subjective:      Chief Complaint/Reason for Admission: encounter for hysterectomy     History of Present Illness: Shayna Melchor is a 43 yo patient who presents for hysterectomy due to long standing pelvic pain, painful periods and irregular spotting.  She has not seen improvement with NSAIDS or IUD. She would like to proceed with hysterectomy for definitive management.  HTN well controlled on medications.  Regular exercise with walking. Has received clearance from her PCP to proceed with surgery.             Past Medical History:   Diagnosis Date    ADHD (attention deficit hyperactivity disorder)      Depression      Hyperlipidemia      Hypertension      EHSAN (obstructive sleep apnea)       wears cpap    PTSD (post-traumatic stress disorder)              Past Surgical History:   Procedure Laterality Date     SECTION   2015, 2017    SINUS SURGERY   2009     deviated septum    TUBAL LIGATION   2017      Family History         Problem Relation (Age of Onset)     Arthritis Mother, Paternal Grandmother     Asthma Maternal Grandmother     Breast cancer Paternal Aunt, Paternal Aunt, Paternal Grandfather     Cancer Paternal Aunt, Paternal Grandmother     Depression Mother, Father, Sister, Maternal Grandmother, Paternal Grandmother, Brother     Diabetes Mother     Heart disease Paternal Grandfather     Hyperlipidemia Mother     Hypertension Mother     Learning disabilities Sister, Brother     Mental illness Mother, Father, Sister, Brother     Miscarriages / Stillbirths Paternal Grandmother     Vision loss Mother, Father, Paternal Grandmother                   Tobacco Use    Smoking status: Never    Smokeless tobacco: Never   Substance and Sexual Activity    Alcohol use: Not  Currently       Alcohol/week: 2.0 standard drinks of alcohol       Types: 2 Glasses of wine per week    Drug use: Never    Sexual activity: Yes       Partners: Male       Birth control/protection: I.U.D., See Surgical Hx         No medications prior to admission.               Review of patient's allergies indicates:   Allergen Reactions    Benadryl allergy-sinus Palpitations       tachycardia    Ciprofloxacin Hives, Shortness Of Breath and Other (See Comments)       hives    Diphenhydramine Shortness Of Breath, Palpitations and Other (See Comments)       Other Reaction(s): Other          Fish containing products Other (See Comments), Anaphylaxis, Hives and Shortness Of Breath       White fish     Any type of fish.    Latex, natural rubber Hives    Nickel Hives and Rash         Review of Systems   Constitutional: Negative.    HENT: Negative.     Eyes: Negative.    Respiratory:  Negative for shortness of breath.    Cardiovascular:         Mild venous insufficiency   Gastrointestinal:  Positive for abdominal pain, constipation and diarrhea.   Endocrine: Negative.    Genitourinary:  Positive for menorrhagia, menstrual problem, pelvic pain and vaginal bleeding.   Musculoskeletal: Negative.    Integumentary:  Negative.   Neurological: Negative.    Hematological: Negative.    Psychiatric/Behavioral:  Positive for depression.    Breast: negative.       Objective:      Vital Signs (Most Recent): Vital Signs (24h Range):      There is no height or weight on file to calculate BMI.     Physical Exam:   Constitutional: She is oriented to person, place, and time. She appears well-developed and well-nourished.    HENT:   Head: Normocephalic and atraumatic.    Eyes: Pupils are equal, round, and reactive to light. EOM are normal.    Neck: No thyromegaly present.    Cardiovascular:       Exam reveals no clubbing and no cyanosis.       Cleared, normal studies, tachycardia improved.      Pulmonary/Chest: Effort normal.          Abdominal: Soft.     Genitourinary:    Vagina and uterus normal.             Musculoskeletal: Normal range of motion.       Neurological: She is alert and oriented to person, place, and time.    Skin: Skin is warm and dry. No cyanosis. Nails show no clubbing.    Psychiatric: She has a normal mood and affect. Her behavior is normal. Judgment and thought content normal.         Laboratory:        Lab Results   Component Value Date     WBC 11.02 10/03/2024     HGB 12.0 10/03/2024     HCT 35.4 (L) 10/03/2024     MCV 96 10/03/2024      10/03/2024      CMP        Sodium   Date Value Ref Range Status   10/03/2024 139 136 - 145 mmol/L Final            Potassium   Date Value Ref Range Status   10/03/2024 4.2 3.5 - 5.1 mmol/L Final            Chloride   Date Value Ref Range Status   10/03/2024 107 95 - 110 mmol/L Final            CO2   Date Value Ref Range Status   10/03/2024 23 23 - 29 mmol/L Final            Glucose   Date Value Ref Range Status   10/03/2024 114 (H) 70 - 110 mg/dL Final            BUN   Date Value Ref Range Status   10/03/2024 12 6 - 20 mg/dL Final            Creatinine   Date Value Ref Range Status   10/03/2024 0.8 0.5 - 1.4 mg/dL Final            Calcium   Date Value Ref Range Status   10/03/2024 9.7 8.7 - 10.5 mg/dL Final            Total Protein   Date Value Ref Range Status   10/03/2024 7.3 6.0 - 8.4 g/dL Final            Albumin   Date Value Ref Range Status   10/03/2024 3.6 3.5 - 5.2 g/dL Final              Total Bilirubin   Date Value Ref Range Status   10/03/2024 0.4 0.1 - 1.0 mg/dL Final       Comment:       For infants and newborns, interpretation of results should be based  on gestational age, weight and in agreement with clinical  observations.     Premature Infant recommended reference ranges:  Up to 24 hours.............<8.0 mg/dL  Up to 48 hours............<12.0 mg/dL  3-5 days..................<15.0 mg/dL  6-29 days.................<15.0 mg/dL               Alkaline Phosphatase    Date Value Ref Range Status   10/03/2024 81 55 - 135 U/L Final            AST   Date Value Ref Range Status   10/03/2024 9 (L) 10 - 40 U/L Final            ALT   Date Value Ref Range Status   10/03/2024 28 10 - 44 U/L Final            Anion Gap   Date Value Ref Range Status   10/03/2024 9 8 - 16 mmol/L Final            eGFR   Date Value Ref Range Status   10/03/2024 >60.0 >60 mL/min/1.73 m^2 Final               Diagnostic Results:  Stress EKG:    The ECG portion of the study is negative for ischemia.    The patient reported no chest pain during the stress test.    The blood pressure response to stress was normal.    During stress, occasional PACs are noted.    The patient exercised for 6 minutes 37 seconds on a high ramp protocol, achieving a peak heart rate of 136 bpm, which is 80% of the age predicted maximum heart rate.    Baseline ECG: The Baseline ECG reveals sinus rhythm. There is low voltage.    The ECG portion of the study is negative for ischemia.    Stress ECG: There is no ST segment deviation identified during the protocol.        Pelvic u/s  EXAMINATION:  US PELVIS COMP WITH TRANSVAG NON-OB (XPD)     CLINICAL HISTORY:  Displacement of intrauterine contraceptive device, initial encounter     TECHNIQUE:  Transabdominal sonography of the pelvis was performed, followed by transvaginal sonography to better evaluate the uterus and ovaries.     COMPARISON:  None.     FINDINGS:  Uterus:     Size: 7.9 x 3.7 x 5.4 cm     The parenchyma is homogeneous.  Endometrium is normal caliber measuring 0.6 cm.     Right ovary:     Size: 3.7 x 2.6 x 2.2 cm.  Cyst with a reticular pattern of internal echoes measures 2.2 cm.     Left ovary:     Not visualized     Free Fluid:     None.     Impression:     Intrauterine device in good position in the endometrial canal.     Small right ovarian hemorrhagic cyst or follicle.  No follow-up required in this age group.     Assessment/Plan:            Active Diagnoses:     Diagnosis  Date Noted POA    PRINCIPAL PROBLEM:  Dysmenorrhea [N94.6] 10/22/2024 Yes       Problems Resolved During this Admission:   Plan for hyst with ovarian preservation unless abnormalities present:  -consents signed and to chart  -pre-op clearance obtained  -DVT ppx with SCDs     HTN: controlled of 2 medication regimen  -normal cardiac studies/ normal stress EKG  -prior tachycardia resolved     EHSAN:  Resume normal interventions on discharge to home     Rima Vann,   Gynecology  [facility] - [facility] ([place])

## 2025-01-24 ENCOUNTER — HOSPITAL ENCOUNTER (OUTPATIENT)
Dept: RADIOLOGY | Facility: HOSPITAL | Age: 45
Discharge: HOME | End: 2025-01-24
Attending: INTERNAL MEDICINE
Payer: OTHER GOVERNMENT

## 2025-01-24 ENCOUNTER — OFFICE VISIT (OUTPATIENT)
Dept: INTERNAL MEDICINE | Facility: CLINIC | Age: 45
End: 2025-01-24
Payer: OTHER GOVERNMENT

## 2025-01-24 VITALS
WEIGHT: 246.5 LBS | HEIGHT: 65 IN | OXYGEN SATURATION: 99 % | DIASTOLIC BLOOD PRESSURE: 82 MMHG | HEART RATE: 87 BPM | SYSTOLIC BLOOD PRESSURE: 120 MMHG | BODY MASS INDEX: 41.07 KG/M2

## 2025-01-24 DIAGNOSIS — M25.561 ACUTE PAIN OF RIGHT KNEE: Primary | ICD-10-CM

## 2025-01-24 DIAGNOSIS — M25.561 ACUTE PAIN OF RIGHT KNEE: ICD-10-CM

## 2025-01-24 PROCEDURE — 99999 PR PBB SHADOW E&M-EST. PATIENT-LVL V: CPT | Mod: PBBFAC,,, | Performed by: INTERNAL MEDICINE

## 2025-01-24 PROCEDURE — 73560 X-RAY EXAM OF KNEE 1 OR 2: CPT | Mod: TC,LT

## 2025-01-24 PROCEDURE — 99215 OFFICE O/P EST HI 40 MIN: CPT | Mod: PBBFAC,25 | Performed by: INTERNAL MEDICINE

## 2025-01-24 PROCEDURE — 99213 OFFICE O/P EST LOW 20 MIN: CPT | Mod: S$PBB,,, | Performed by: INTERNAL MEDICINE

## 2025-01-24 PROCEDURE — G2211 COMPLEX E/M VISIT ADD ON: HCPCS | Mod: S$PBB,,, | Performed by: INTERNAL MEDICINE

## 2025-01-24 PROCEDURE — 73560 X-RAY EXAM OF KNEE 1 OR 2: CPT | Mod: 26,59,LT, | Performed by: RADIOLOGY

## 2025-01-24 PROCEDURE — 73562 X-RAY EXAM OF KNEE 3: CPT | Mod: 26,RT,, | Performed by: RADIOLOGY

## 2025-01-24 NOTE — PROGRESS NOTES
"Subjective:       Patient ID: Shayna Melchor is a 44 y.o. female.    Chief Complaint: RIGHT KNEE PAIN (For about a month )    HPI  44 y.o. female here for evaluation of right knee pain for a month. Standing okay but when needs to bend knee there is standing and pulling. No obvious injury nor swelling. Ibuprofen and icing which helps but returns shortly after.   Ibuprofen 600mg once per day.   Review of Systems   Constitutional:  Positive for activity change. Negative for unexpected weight change.   HENT:  Negative for hearing loss, rhinorrhea and trouble swallowing.    Eyes:  Negative for discharge and visual disturbance.   Respiratory:  Negative for chest tightness and wheezing.    Cardiovascular:  Negative for chest pain and palpitations.   Gastrointestinal:  Negative for blood in stool, constipation, diarrhea and vomiting.   Endocrine: Negative for polydipsia and polyuria.   Genitourinary:  Negative for difficulty urinating, dysuria, hematuria and menstrual problem.   Musculoskeletal:  Positive for arthralgias. Negative for joint swelling and neck pain.   Neurological:  Negative for weakness and headaches.   Psychiatric/Behavioral:  Negative for confusion and dysphoric mood.        Objective:   /82 (BP Location: Left arm, Patient Position: Sitting)   Pulse 87   Ht 5' 5" (1.651 m)   Wt 111.8 kg (246 lb 7.6 oz)   LMP 10/01/2024 (Approximate) Comment: speci cup given to patient for dos  SpO2 99%   BMI 41.02 kg/m²      Physical Exam  Constitutional:       General: She is not in acute distress.     Appearance: She is well-developed. She is not diaphoretic.   HENT:      Head: Normocephalic and atraumatic.   Cardiovascular:      Rate and Rhythm: Normal rate and regular rhythm.   Pulmonary:      Effort: Pulmonary effort is normal. No respiratory distress.      Breath sounds: No wheezing or rales.   Musculoskeletal:      Comments: Slight swelling right knee, intact ROM, no erythema, negative anterior and " posterior drawer sign   Skin:     General: Skin is warm and dry.   Neurological:      Mental Status: She is alert and oriented to person, place, and time.   Psychiatric:         Behavior: Behavior normal.         Assessment:       1. Acute pain of right knee        Plan:       subacute pain of right knee  -     X-ray Knee Ortho Right; Future; Expected date: 01/24/2025  -     Ambulatory referral to Orthopedics; Future; Expected date: 01/31/2025          Health Maintenance Due   Topic    Mammogram

## 2025-01-25 ENCOUNTER — OFFICE VISIT (OUTPATIENT)
Dept: ORTHOPEDICS | Facility: CLINIC | Age: 45
End: 2025-01-25
Payer: OTHER GOVERNMENT

## 2025-01-25 VITALS — WEIGHT: 246.5 LBS | BODY MASS INDEX: 41.07 KG/M2 | HEIGHT: 65 IN

## 2025-01-25 DIAGNOSIS — M25.561 ACUTE PAIN OF RIGHT KNEE: ICD-10-CM

## 2025-01-25 DIAGNOSIS — M17.11 PRIMARY OSTEOARTHRITIS OF RIGHT KNEE: Primary | ICD-10-CM

## 2025-01-25 PROCEDURE — 99999 PR PBB SHADOW E&M-EST. PATIENT-LVL IV: CPT | Mod: PBBFAC,,, | Performed by: PHYSICIAN ASSISTANT

## 2025-01-25 PROCEDURE — 99214 OFFICE O/P EST MOD 30 MIN: CPT | Mod: S$PBB,,, | Performed by: PHYSICIAN ASSISTANT

## 2025-01-25 PROCEDURE — 99214 OFFICE O/P EST MOD 30 MIN: CPT | Mod: PBBFAC | Performed by: PHYSICIAN ASSISTANT

## 2025-01-25 RX ORDER — MELOXICAM 15 MG/1
15 TABLET ORAL DAILY
Qty: 30 TABLET | Refills: 1 | Status: SHIPPED | OUTPATIENT
Start: 2025-01-25

## 2025-01-25 NOTE — PROGRESS NOTES
SUBJECTIVE:     Chief Complaint & History of Present Illness:  Shayna Melchor is a New patient 44 y.o. female who is seen here today with a complaint of    Chief Complaint   Patient presents with    Right Knee - Pain    . History of Present Illness    - Right knee pain    - Presents with right knee pain ongoing for about a month  - Reports pain at the bottom of her kneecap, describing it as a pulling or tearing sensation when bending the knee, which subsides once the knee is in place  - Occasionally experiences sharp pain when hitting her knee on something  - Feels a pulling sensation rather than sharp pain when bending her knee towards her chest  - Pain improves with ibuprofen, which provides significant relief  - Icing provides temporary relief, but pain returns once ice is removed  - Reports difficulty with walking, which she usually does for exercise, due to knee pain  - Denies any specific incident or injury that may have caused symptom onset  - Has been using ibuprofen for pain relief and icing her knee  - Previously saw her PCP regarding this issue and was prescribed ibuprofen  - Mentions having arthritis in her lower back  - Denies any swelling, clicking, popping, or cracking sounds in the knee    - Ibuprofen: Provides significant pain relief  - Icing: Provides temporary relief while applied       On a scale of 1-10, with 10 being worst pain imaginable, he rates this pain as 3 on good days and 4 on bad days.  she describes the pain as sore.    Review of patient's allergies indicates:   Allergen Reactions    Benadryl allergy-sinus Palpitations     tachycardia    Ciprofloxacin Hives, Shortness Of Breath and Other (See Comments)     hives    Diphenhydramine Shortness Of Breath, Palpitations and Other (See Comments)     Other Reaction(s): Other        Fish containing products Other (See Comments), Anaphylaxis, Hives and Shortness Of Breath     White fish    Any type of fish.    Latex, natural rubber  Hives    Nickel Hives and Rash         Current Outpatient Medications   Medication Sig Dispense Refill    acetaminophen (TYLENOL) 500 MG tablet Take 2 tablets (1,000 mg total) by mouth every 8 (eight) hours as needed for Pain. 42 tablet 0    albuterol (PROAIR HFA) 90 mcg/actuation inhaler       azelastine (ASTELIN) 137 mcg (0.1 %) nasal spray 2 sprays (274 mcg total) by Nasal route 2 (two) times daily. 30 mL 11    carvediloL (COREG) 6.25 MG tablet Take 1 tablet (6.25 mg total) by mouth 2 (two) times daily with meals. 180 tablet 3    clonazePAM (KLONOPIN) 1 MG tablet Take 1 tablet (1 mg total) by mouth every evening. 30 tablet 3    esomeprazole (NEXIUM) 40 MG capsule Take 1 capsule (40 mg total) by mouth before breakfast. 90 capsule 3    famotidine (PEPCID) 20 MG tablet Take 1 tablet (20 mg total) by mouth every evening. 30 tablet 11    hyoscyamine 0.125 mg Subl Place 1 tablet (0.125 mg total) under the tongue every 4 (four) hours as needed (abdominal cramping). 120 tablet 1    ibuprofen (ADVIL,MOTRIN) 600 MG tablet Take 1 tablet (600 mg total) by mouth every 8 (eight) hours as needed for Pain. 21 tablet 0    montelukast (SINGULAIR) 10 mg tablet Take 1 tablet (10 mg total) by mouth every evening. 90 tablet 3    omega-3s/dha/epa/algal oil (MEGARED ADVANCED OMEGA-3 ALGAE ORAL) Algae based omega 3.  IWi one po daily.      ondansetron (ZOFRAN) 8 MG tablet TK 1 T PO BID PRN      telmisartan (MICARDIS) 80 MG Tab Take 1 tablet (80 mg total) by mouth once daily. 90 tablet 3    vilazodone (VIIBRYD) 10 mg Tab tablet Take 1 tablet (10 mg total) by mouth 2 (two) times a day. 60 tablet 2    EPINEPHrine (EPIPEN 2-TRINITY) 0.3 mg/0.3 mL AtIn Inject 0.3 mLs (0.3 mg total) into the muscle once. for 1 dose 0.3 mL 6    meloxicam (MOBIC) 15 MG tablet Take 1 tablet (15 mg total) by mouth once daily. 30 tablet 1     No current facility-administered medications for this visit.       Past Medical History:   Diagnosis Date    ADHD (attention  deficit hyperactivity disorder)     Depression     Hyperlipidemia     Hypertension     EHSAN (obstructive sleep apnea)     wears cpap    PTSD (post-traumatic stress disorder)        Past Surgical History:   Procedure Laterality Date     SECTION  2015, 2017    ENDOSCOPIC CARPAL TUNNEL RELEASE Right 2024    Procedure: RELEASE, CARPAL TUNNEL, ENDOSCOPIC - right;  Surgeon: Jamey Mccodr MD;  Location: [facility] OR;  Service: Orthopedics;  Laterality: Right;    LAPAROSCOPIC SALPINGECTOMY Right 10/23/2024    Procedure: SALPINGECTOMY, LAPAROSCOPIC;  Surgeon: Rima Vann DO;  [place] [facility], [place], [place] by [facility] OR;  Service: OB/GYN;  Laterality: Right;    LAPAROSCOPIC TOTAL HYSTERECTOMY N/A 10/23/2024    Procedure: HYSTERECTOMY, TOTAL, LAPAROSCOPIC;  Surgeon: Rima Vann DO;  Location: [facility], [place], operated by [facility] OR;  Service: OB/GYN;  Laterality: N/A;    SINUS SURGERY  2009    deviated septum    TUBAL LIGATION  2017       Vital Signs (Most Recent)  There were no vitals filed for this visit.        Review of Systems:  ROS:  Constitutional: no fever or chills, positive structure sleep apnea, postural imbalance  Eyes: no visual changes  ENT: no nasal congestion or sore throat  Respiratory: no cough or shortness of breath, positive for asthma  Cardiovascular: no chest pain or palpitations, positive for hypertension  Gastrointestinal: no nausea or vomiting, tolerating diet, positive esophageal reflux functional diarrhea  Genitourinary: no hematuria or dysuria  Integument/Breast: no rash or pruritis  Hematologic/Lymphatic: no easy bruising or lymphadenopathy  Musculoskeletal: no arthralgias or myalgias, decreased range of motion of the trunk and back decreased strength of the trunk and back  Neurological: no seizures or tremors, positive for migraine, degenerative disc disease lumbar spine, carpal tunnel syndrome of the right wrist  Behavioral/Psych: no auditory or visual hallucinations, positive ADHD, panic disorder,  "posttraumatic stress disorder, major depressive disorder  Endocrine: no heat or cold intolerance                OBJECTIVE:     PHYSICAL EXAM:  Height: 5' 5" (165.1 cm) Weight: 111.8 kg (246 lb 7.6 oz), General Appearance: Well nourished, well developed, in no acute distress.  Neurological: Mood & affect are normal.    right  Knee Exam:  Knee Range of Motion:0-120 degrees flexion   Effusion:none  Condition of skin:intact  Location of tenderness:Medial joint line and Patellar tendon   Strength:5 of 5  Stability:  Lachman: stable, LCL: stable, MCL: stable, PCL: stable, and posteromedial (dial): stable  Varus /Valgus stress:  normal  Kimberli:   negative/negative    left  Knee Exam:  Knee Range of Motion:0-120 degrees flexion   Effusion:none  Condition of skin:intact  Location of tenderness:Medial joint line   Strength:5 of 5  Stability:  Lachman: stable, LCL: stable, MCL: stable, PCL: stable, and posteromedial (dial): stable  Varus /Valgus stress:  normal  Kimberli:   negative/negative      Hip Examination:  full painless range of motion, without tenderness    RADIOGRAPHS:  X-rays the knees from previous visit reviewed by me today demonstrate mild arthritic changes throughout both knees right more so than left with medial joint space narrowing sclerotic changes noted in the medial compartments bilaterally right more so than left no osteophytic spurring no fracture dislocation or other bony abnormalities    ASSESSMENT/PLAN:       ICD-10-CM ICD-9-CM   1. Primary osteoarthritis of right knee  M17.11 715.16   2. Acute pain of right knee  M25.561 719.46       Plan: We discussed with the patient at length all the different treatment options available for  the knee including anti-inflammatories, acetaminophen, rest, ice, knee strengthening exercise, occasional cortisone injections for temporary relief, Viscosupplimentation injections, arthroscopic debridement osteotomy, and finally knee arthroplasty.   Assessment & Plan  "   M17.11 Unilateral primary osteoarthritis, right knee  M22.2X1 Patellofemoral disorders, right knee  M54.50 Low back pain, unspecified  Z79.1 Long term (current) use of non-steroidal anti-inflammatories (NSAID)    MEDICATIONS:  - Meloxicam, to be taken daily with food for 7 days.    PROCEDURES:  - Reviewed X-ray results with patient, explaining findings of mild to moderate arthritis and kneecap misalignment.  - Demonstrated leg raise exercise to strengthen medial quadriceps muscle.    PATIENT INSTRUCTIONS:  - Perform straight leg raises with foot pointed towards the wall to strengthen medial quadriceps.  - Do 5-6 repetitions on each leg every morning.  - Apply ice to the knee as needed for pain.       This note was generated with the assistance of ambient listening technology. Verbal consent was obtained by the patient and accompanying visitor(s) for the recording of patient appointment to facilitate this note. I attest to having reviewed and edited the generated note for accuracy, though some syntax or spelling errors may persist. Please contact the author of this note for any clarification.

## 2025-01-29 ENCOUNTER — CLINICAL SUPPORT (OUTPATIENT)
Dept: REHABILITATION | Facility: OTHER | Age: 45
End: 2025-01-29
Attending: OBSTETRICS & GYNECOLOGY
Payer: OTHER GOVERNMENT

## 2025-01-29 DIAGNOSIS — M62.89 PELVIC FLOOR DYSFUNCTION: Primary | ICD-10-CM

## 2025-01-29 DIAGNOSIS — Z90.710 STATUS POST HYSTERECTOMY: ICD-10-CM

## 2025-01-29 PROCEDURE — 97161 PT EVAL LOW COMPLEX 20 MIN: CPT | Mod: PN

## 2025-01-29 PROCEDURE — 97530 THERAPEUTIC ACTIVITIES: CPT | Mod: PN

## 2025-01-29 NOTE — PATIENT INSTRUCTIONS
Manual Release to Pelvic Floor   Wash your hands with soap and water.   Find a place in your home that is safe, quiet, and comfortable   Position yourself in a partially reclined position with your knees bend, such as your back against the head of your bed with pillows behind you for cushioning. The most comfortable and relaxed for the pelvic floor is to allow the legs to drop completely open, with or without support under your knees   - Some people also prefer being seated on the toilet, bent forward with one elbow resting on a new and using the other hand to perform the stretching  - You can also do this in the bath tub with warm water  Use a generous amount of water-based lubricant on your thumb   Begin your session by breathing in and allowing your belly to expand, followed by exhaling, allowing your belly to slowly fall. The act of slowly exhaling helps to naturally relax the pelvic floor muscles  Repeat the deep breathing pattern, and continue to do so steadily and deliberately. Gently bring your thumb to the opening of the vagina and carefully insert it on an exhale  Think of your pelvic floor as a clock, where the clitoris is at 12 oclock and the back passage is at 6 oclock. Aim for 5 oclock or 7 oclock. You should always avoid 11-1 o'clock and 5-7 o'clock as that's where your urethra and bowel sit. We don't want to press on them  Gently sweep your thumb until you encounter a tender point. When you find tender point, gently compress your thumb into the tender point with the same firmness you would use to check a tomato for ripeness. For example, dont press so hard you squish your tomato  Maintain gentle pressure on the tender point and slowly. When you find this position, remain there for 30 sec - 1 min to allow the tender point to fully release. Continue to breathe deeply  Repeat this process 1 or 2 times per day as needed  NOTE: during stretching, go to the point of discomfort and not pain. Do not  hold breath and focus on keeping vaginal muscles fully relaxed

## 2025-01-29 NOTE — PROGRESS NOTES
Outpatient Rehab    Physical Therapy Evaluation    Patient Name: Olga Melchor  MRN: [id number]  YOB: 1980  Today's Date: 2025    Therapy Diagnosis:   Encounter Diagnoses   Name Primary?    Status post hysterectomy     Pelvic floor dysfunction Yes     Physician: Rima Vann,     Physician Orders: Eval and Treat  Medical Diagnosis: Status post hysterectomy [Z90.710]     Visit # / Visits Authorized:     Date of Evaluation:  2025   Insurance Authorization Period: 2024 to 2025  Plan of Care Certification:  2025 to 2025      Time In: 1115   Time Out: 1200  Total Time: 45   Total Billable Time: 15    SUBJECTIVE     History of Present Illness  Olga is a 44 y.o. female who reports to physical therapy with a chief concern of Status Post Hysterectomy. According to the patient's chart, Olga has a past medical history of ADHD (attention deficit hyperactivity disorder), Depression, Hyperlipidemia, Hypertension, EHSAN (obstructive sleep apnea), and PTSD (post-traumatic stress disorder). Olga has a past surgical history that includes  section (2015, 2017); Tubal ligation (2017); Sinus surgery (); Laparoscopic total hysterectomy (N/A, 10/23/2024); Laparoscopic salpingectomy (Right, 10/23/2024); and Endoscopic carpal tunnel release (Right, 2024).           History of Present Condition/Illness: Had a hysterectomy ~3 months ago, which eliminated a lot of her pain. Since then she has only had surgery recovery pain. Patient denies having any issues with her bladder. Patient reports that she has IBS-D, working with a nutritionist to adjust her diet and that has been very beneficial. Hasn't attempted sex since having the hysterectomy, made 1 attempt but didn't feel like she was quite ready. Prior to surgery cervix was always very painful, so she's hopeful that now that she doesn't have one that pain will improve.    OB/GYN History:   Hysterectomy?  "Yes  Sexually active? Yes working towards returning to this    SEXUAL/PELVIC PAIN History  Pelvic Pain with: with intercourse     PAIN:  Location: low back pain   Current: 2/10    BLADDER History  None Reported    BOWEL History  IBS-D managed with a nutritionist    Prior Therapy/Previous treatment included: no prior pelvic floor therapy     Patients goals: "Return to pain free intercourse      Past Medical History/Physical Systems Review:   Medical History: Olga  has a past medical history of ADHD (attention deficit hyperactivity disorder), Depression, Hyperlipidemia, Hypertension, EHSAN (obstructive sleep apnea), and PTSD (post-traumatic stress disorder).     Surgical History: Shayna Melchor  has a past surgical history that includes  section (2015, 2017); Tubal ligation (2017); Sinus surgery (); Laparoscopic total hysterectomy (N/A, 10/23/2024); Laparoscopic salpingectomy (Right, 10/23/2024); and Endoscopic carpal tunnel release (Right, 2024).    Medications: Shayna Rutherford has a current medication list which includes the following prescription(s): acetaminophen, albuterol, azelastine, carvedilol, clonazepam, epinephrine, esomeprazole, famotidine, hyoscyamine, ibuprofen, meloxicam, montelukast, omega-3s/dha/epa/algal oil, ondansetron, telmisartan, and vilazodone.    Allergies:   Review of patient's allergies indicates:   Allergen Reactions    Benadryl allergy-sinus Palpitations     tachycardia    Ciprofloxacin Hives, Shortness Of Breath and Other (See Comments)     hives    Diphenhydramine Shortness Of Breath, Palpitations and Other (See Comments)     Other Reaction(s): Other        Fish containing products Other (See Comments), Anaphylaxis, Hives and Shortness Of Breath     White fish    Any type of fish.    Latex, natural rubber Hives    Nickel Hives and Rash        OBJECTIVE     VAGINAL PELVIC FLOOR EXAM  Exam updated: 2025      External Assessment  Introitus: " WNL  Skin condition: WNL  Scarring: none   Sensation: WNL   Pain: none  Voluntary contraction: visible lift  Voluntary relaxation: visible drop  Bearing down: visible drop     Internal Assessment  Pain: tender areas noted as follows: Superficial transverse perineal some Bulbocavernosus & ischiocavernosus but less   Sensation: able to localized pressure appropriately   Vaginal vault: WNL   Muscle Bulk: WFL   Muscle Power: 4/5  Muscle Endurance: 10 seconds  # Reps To Fatigue: 4      Quality of contraction: slight quivering with holds   Specificity: WNL   Coordination: WNL       Intake Outcome Measure for FOTO Survey    Therapist reviewed FOTO scores for Olga Melchor on 1/29/2025.   FOTO report - see Media section or FOTO account episode details.     Intake Score:  %      Treatment:    Therapeutic Activity Patient participated in dynamic functional therapeutic activities to improve functional performance for 15 minutes. Including: Education as described below.   [x] Education on pelvic floor anatomy, function, and assessment and impact on current level of function   [x] Patient prognosis and Physical therapy plan of care  [x] HEP building/HEP review    PATIENT EDUCATION AND HOME EXERCISES     Education provided: general anatomy/physiology of urinary & bowel system, benefits of treatment, and alternative methods of treatment were discussed with the patient. Additionally, Shayna Krish was provided education on pt prognosis and physical therapy plan of care.     Written Home Exercises provided: yes  Exercises were reviewed, and Olga was able to demonstrate them prior to the end of the session. Olga demonstrated good understanding of the education provided. See EMR under 'Patient Instructions' for exercises and education provided during session.    Olga demonstrated good  understanding of the education & exercises provided.    ASSESSMENT     Assessment & Plan   Assessment  Olga presents with a condition of Low  complexity.   Presentation of Symptoms: Stable  Will Comorbidities Impact Care: No            Prognosis: Good  Assessment Details: Olga is a 44 y.o. female referred to outpatient physical therapy with a medical diagnosis of Status post hysterectomy  and additional complaints of dyspareunia. Pt presents with tenderness to pelvic floor muscles of layer 1 and mildly decreased pelvic floor muscles endurance as evidanced by muscle quivering and ability to only perform 4 reps. Symptoms impair the patient's ability to perform ADLs and functional mobility. Olga will benefit from skilled outpatient physical therapy to address the deficits stated above and in the chart below, provide patient/family education, and to maximize the patient's level of independence.      Plan  From a physical therapy perspective, the patient would benefit from: Skilled Rehab Services    Planned therapy interventions include: Therapeutic exercise, Therapeutic activities, Neuromuscular re-education, and Manual therapy.                          Plan details: Outpatient Physical Therapy 1-2 times weekly for 12 weeks        Goals:   Active       Pain       Patient will report an 80% reduction in pain with intercourse       Start:  01/29/25    Expected End:  04/29/25               Pelvic Floor       Perform 10x10 second Kegels for improved pelvic floor muscle strength & endurance       Start:  01/29/25    Expected End:  04/29/25              Erendira Meneses, PT, DPT

## 2025-02-03 NOTE — PROGRESS NOTES
Dr. Mccord is the supervising physician for this encounter/patient    Shayna Melchor presents for post-operative evaluation.  The patient is now approximately 11 weeks and 6 days s/p right eCTR with Dr Mccord on 11/13/24.  Overall the patient reports doing well, and she notes a 2/10 pain.  She notes her symptoms of numbness and tingling are completely resolved, but she reports she is experiencing mild centralized pillar pain.  The patient is taking ibuprofen for post operative pain control. Patient admits to improving range of motion.  Her right wrist range of motion is significantly improved with occupational therapy, and she reports attended 4 visits with significant improvement.  She presents today for her 2nd postoperative visit with no further complaints.    PE:    AA&O x 4.  NAD  HEENT:  NCAT, sclera nonicteric  Lungs:  Respirations are equal and unlabored.  CV:  2+ bilateral upper and lower extremity pulses.  MSK: The wound is healing well with no signs of erythema or warmth.  There is no drainage.  No clinical signs or symptoms of infection are present.  She is able to make a full composite fist to the right hand.  She is able to oppose the thumb to the small finger without pain.  Wrist range of motion full to the right wrist.  She is neurovascularly intact to the right upper extremity.    A/P: Status post above, doing well  1) Continue with weight bearing as tolerated.  2) Continue active and passive range of motion exercises.  Continue OT.  3) Continue scar massage with Mederma, vitamin-E oil, or cocoa butter today.  4) Follow up as needed/to discuss contralateral eCTR or sooner for any problems  5) Call with any questions/concerns in the interim       Stefanie Rogel PA-C

## 2025-02-04 ENCOUNTER — OFFICE VISIT (OUTPATIENT)
Dept: ORTHOPEDICS | Facility: CLINIC | Age: 45
End: 2025-02-04
Payer: OTHER GOVERNMENT

## 2025-02-04 DIAGNOSIS — Z98.890 POST-OPERATIVE STATE: Primary | ICD-10-CM

## 2025-02-04 DIAGNOSIS — G56.01 CARPAL TUNNEL SYNDROME OF RIGHT WRIST: ICD-10-CM

## 2025-02-04 PROCEDURE — 99024 POSTOP FOLLOW-UP VISIT: CPT | Mod: ,,,

## 2025-02-04 PROCEDURE — 99213 OFFICE O/P EST LOW 20 MIN: CPT | Mod: PBBFAC

## 2025-02-04 PROCEDURE — 99999 PR PBB SHADOW E&M-EST. PATIENT-LVL III: CPT | Mod: PBBFAC,,,

## 2025-02-06 ENCOUNTER — OFFICE VISIT (OUTPATIENT)
Dept: PSYCHIATRY | Facility: CLINIC | Age: 45
End: 2025-02-06
Payer: OTHER GOVERNMENT

## 2025-02-06 DIAGNOSIS — F33.1 MODERATE EPISODE OF RECURRENT MAJOR DEPRESSIVE DISORDER: ICD-10-CM

## 2025-02-06 DIAGNOSIS — G47.00 INSOMNIA, UNSPECIFIED TYPE: ICD-10-CM

## 2025-02-06 DIAGNOSIS — F41.1 GENERALIZED ANXIETY DISORDER: Primary | ICD-10-CM

## 2025-02-06 PROCEDURE — 98006 SYNCH AUDIO-VIDEO EST MOD 30: CPT | Mod: 95,,, | Performed by: PHYSICIAN ASSISTANT

## 2025-02-06 RX ORDER — VILAZODONE HYDROCHLORIDE 10 MG/1
10 TABLET ORAL 2 TIMES DAILY
Qty: 60 TABLET | Refills: 2 | Status: SHIPPED | OUTPATIENT
Start: 2025-02-06 | End: 2025-05-07

## 2025-02-06 NOTE — PROGRESS NOTES
"The patient location is: parked car, [facility]  The chief complaint leading to consultation is: f/u    Visit type: audiovisual    Face to Face time with patient: 16  22 minutes of total time spent on the encounter, which includes face to face time and non-face to face time preparing to see the patient (eg, review of tests), Obtaining and/or reviewing separately obtained history, Documenting clinical information in the electronic or other health record, Independently interpreting results (not separately reported) and communicating results to the patient/family/caregiver, or Care coordination (not separately reported).         Each patient to whom he or she provides medical services by telemedicine is:  (1) informed of the relationship between the physician and patient and the respective role of any other health care provider with respect to management of the patient; and (2) notified that he or she may decline to receive medical services by telemedicine and may withdraw from such care at any time.    Notes:     Outpatient Psychiatry Follow-Up Visit (PA)    02/06/2025    Clinical Status of Patient:  Outpatient (Ambulatory)    Chief Complaint:  Shayna Melchor is a 44 y.o. female who presents today for follow-up of depression and anxiety.  Met with patient.     Current Medications:   Viibryd 10 mg BID  Clonazepam 1 mg qhs    Interval History and Content of Current Session:  Patient seen and chart reviewed. Last seen on 11/22/2024    Patient has a psychiatric history of: MDD, anxiety    Pt reports for follow up today stating she's been "okay."    She has started a new job, has been helpful to decrease stress.   She reports its still new, still decompressing from old job.     She reports anxiety has been "much less". She reports decreased daily anxiety, less overwhelmed/panic.     She continues to endorse some depression, feels "evita flat', reports decreased/interest. She notes this has been getting better " "last few weeks.   She reports decreased tearfulness, sadness, less feelings of dread.     She remains splitting the viibryd tablet in half, for 10 mg qam and 10 mg qhs, reports ASE when taking 20 mg whole.   She denies ASE with vybrid when taking 10 mg BID.    She is in OT and PT, has been going well.   She has recently found her therapist, starts next week    She is sleeping okay. She continues to take clonazepam 1 mg qhs.     She reports current medications are going well, no med adjustments necessary    No SI/HI/self harm    Psychotherapy:  Target symptoms: depression, anxiety   Why chosen therapy is appropriate versus another modality: relevant to diagnosis  Outcome monitoring methods: self-report, observation  Therapeutic intervention type: supportive psychotherapy  Topics discussed/themes: symptom recognition  The patient's response to the intervention is accepting. The patient's progress toward treatment goals is good.   Duration of intervention: 7 minutes.    Review of Systems   PSYCHIATRIC: Pertinant items are noted in the narrative.    Past Medication Trials:  trazodone, ambien, ativan, restoril, lunesta, belsomra, and remeron for sleep    Remeron- weight gain  Lexapro- increased anxiety  Zoloft- worked, then stopped  Wellbutirn- worsened anxiety  Cymbalta-   Pristiq-   Paxil- weight gain  Trazodone-   Ambien-   Lorazepam  Tenazepam  Lunesta  Belsomra  States "SNRI- "make me feel like I have the flu"      Past Medical, Family and Social History: The patient's past medical, family and social history have been reviewed and updated as appropriate within the electronic medical record - see encounter notes.    Compliance: yes    Side effects: None    Risk Parameters:  Patient reports no suicidal ideation  Patient reports no homicidal ideation  Patient reports no self-injurious behavior  Patient reports no violent behavior    Exam (detailed: at least 9 elements; comprehensive: all 15 elements) "   Constitutional  Vitals:  Most recent vital signs, dated less than 90 days prior to this appointment, were reviewed.   There were no vitals filed for this visit.     General:  unremarkable, age appropriate, casually dressed, neatly groomed     Musculoskeletal  Muscle Strength/Tone:  not examined   Gait & Station:  virtual     Psychiatric  Speech:  no latency; no press   Mood & Affect:  steady  congruent and appropriate   Thought Process:  normal and logical   Associations:  intact   Thought Content:  normal, no suicidality, no homicidality, delusions, or paranoia   Insight:  intact   Judgement: behavior is adequate to circumstances   Orientation:  grossly intact   Memory: intact for content of interview   Language: grossly intact   Attention Span & Concentration:  able to focus   Fund of Knowledge:  intact and appropriate to age and level of education     Assessment and Diagnosis   Status/Progress: Based on the examination today, the patient's problem(s) is/are improved.  New problems have not been presented today.   Lack of compliance are not complicating management of the primary condition.  There are no active rule-out diagnoses for this patient at this time.     General Impression: Patient is a 44 year old female with a psychiatric history of recurrent, moderate MDD, JULIET presenting after switching from prozac to viibryd 10 mg. Patient reports benefit with current medications, viibryd 10 mg BID, and clonazepam 1 mg qhs. She reports less ASE with viibryd 10 mg BID vs 20 mg daily    Pt is stable, doing well.       ICD-10-CM ICD-9-CM    1. Generalized anxiety disorder  F41.1 300.02       2. Moderate episode of recurrent major depressive disorder  F33.1 296.32       3. Insomnia, unspecified type  G47.00 780.52               Intervention/Counseling/Treatment Plan   Medication Management: Continue current medications. The risks and benefits of medication were discussed with the patient.  Continue viibryd 10 mg BID mg  daily  Continue clonazepam 1 mg qhs  Informed pt of the risks of continuous Benzodiazepine use including tolerance, dependence and withdrawals that may be life threatening upon abrupt cessation. Also advised not to take Benzodiazepines with Opiates or other sedatives and also not to drive or operate heavy machinery while using Benzodiazepines.   Discussed diagnosis, risk and benefits of proposed treatment above vs alternative treatment vs no treatment, and potential side effects of these treatments, and the inherent unpredictability of individual responses to these treatments. The patient expresses understanding and gives informed consent to pursue treatment at this time, believing that the potential benefits outweigh the potential risks. Patient has no other questions. Risks/adverse effects at this time include but are not limited to: GI side effects, sexual dysfunction, activation vs sedation, triggering of suicidal ideation, and serotonin syndrome.   Patient voices understanding and agreement with this plan  Encouraged patient to keep future appointments  Instruct patient to call or message with questions  In the event of an emergency, including suicidal ideation, patient was advised to go to the emergency room      Return to Clinic: 3 months    Amber Barbosa PA-C

## 2025-02-20 ENCOUNTER — CLINICAL SUPPORT (OUTPATIENT)
Dept: REHABILITATION | Facility: OTHER | Age: 45
End: 2025-02-20
Payer: OTHER GOVERNMENT

## 2025-02-20 DIAGNOSIS — M62.89 PELVIC FLOOR DYSFUNCTION: Primary | ICD-10-CM

## 2025-02-20 DIAGNOSIS — R29.898 DECREASED GRIP STRENGTH OF RIGHT HAND: ICD-10-CM

## 2025-02-20 DIAGNOSIS — M25.531 RIGHT WRIST PAIN: Primary | ICD-10-CM

## 2025-02-20 PROCEDURE — 97110 THERAPEUTIC EXERCISES: CPT | Mod: PN

## 2025-02-20 PROCEDURE — 97140 MANUAL THERAPY 1/> REGIONS: CPT | Mod: PN

## 2025-02-20 PROCEDURE — 97530 THERAPEUTIC ACTIVITIES: CPT | Mod: PN

## 2025-02-20 PROCEDURE — 97035 APP MDLTY 1+ULTRASOUND EA 15: CPT | Mod: PN

## 2025-02-20 PROCEDURE — 97112 NEUROMUSCULAR REEDUCATION: CPT | Mod: PN

## 2025-02-20 NOTE — PATIENT INSTRUCTIONS
Try to incorporate a little bit of movement throughout your work days stand up briefly every hour try a little side to side twist, side bend wiggle a bit just try to do a little of what feels good. Set a little alarm on your phone or watch if need.        Seated butterfly

## 2025-02-20 NOTE — PROGRESS NOTES
Outpatient Rehab  Physical Therapy Visit    Patient Name: Shayna Melchor  MRN: [id number]  YOB: 1980  Today's Date: 2/20/2025    Therapy Diagnosis:   Encounter Diagnosis   Name Primary?    Pelvic floor dysfunction Yes     Referring Provider: Rima Vann DO    Visit # / Visits Authorized:  2 / 12  Date of Evaluation:  1/29/2025   Insurance Authorization Period: 12/4/2024 to 12/31/2025  Plan of Care Certification:  1/29/2025 to 4/29/2025     Cancelled Visits: -  No Show Visits: -    Time In: 1:15    Time Out: 200  Total Time: 45 minutes  Total Billable Time: 45 minutes    Precautions: standard    --------------------------------------------------------  Subjective     Olga reports Having more more back pain recently, especially painful on the inner thigh she's been flared-up for the last 2 weeks and that sitting at her desk as worsened symptoms.     Noticed that doing doing her manual release, in the shower was better for her at her shower chair than trying laying down in bed & she did a good job of being consistent with that.    Notices that her pain is worse when she is seated more.    She was compliant with home exercise program.  Response to previous treatment: no adverse effect  Functional change: none reported    Pain: adductor pain no number reported    --------------------------------------------------------  Objective    VAGINAL PELVIC FLOOR EXAM  Exam updated: 1/29/2025        External Assessment  Introitus: WNL  Skin condition: WNL  Scarring: none   Sensation: WNL   Pain: none  Voluntary contraction: visible lift  Voluntary relaxation: visible drop  Bearing down: visible drop         Internal Assessment  Pain: tender areas noted as follows: Superficial transverse perineal some Bulbocavernosus & ischiocavernosus but less   Sensation: able to localized pressure appropriately   Vaginal vault: WNL   Muscle Bulk: WFL   Muscle Power: 4/5  Muscle Endurance: 10 seconds  # Reps To  Fatigue: 4                Quality of contraction: slight quivering with holds   Specificity: WNL   Coordination: WNL       Treatment:    Olga received the following interventions during the treatment session:   Pt consents to pelvic floor muscle assessment and treatment.      Therapeutic exercises   [x]Seated butterfly (supine a little back discomfort)    Manual Therapy   [x]soft tissue mobilization of bilateral adductors and cupping of bilateral adductors    Neuromuscular Re-education   [x]standing rocks with proprioceptive ball cue at posterior pelvic floor for down-training     Therapeutic activities  [x] Patient education - pt prognosis, PT plan of care, pelvic floor anatomy & function, pelvic floor muscle assessment, and relationship between pelvic floor dysfunction & lumbar spine/hip/pelvic girdle dysfunction, posture & movement considerations for WF  [x] Pelvic floor assessment -   [x] HEP building/HEP review      Patient's spiritual, cultural, and educational needs considered and patient agreeable to plan of care and goals.     --------------------------------------------------------  Assessment & Plan   Assessment:  patient arriving to todays visit reporting increased left side adductor pain, which was treated during today's session with manual therapy as well as gentle mobility work with good patient response.     Olga is progressing well towards her goals.     Exercises & education were reviewed, and Olga was able to demonstrate understanding prior to the end of the session, as needed. See 'Patient Instructions' for exercises/instructions provided.    Pt prognosis: good    Plan:  Patient will be continually assessed and progressed as appropriate to meet goals and restore function.       Goals:   Active       Pain       Patient will report an 80% reduction in pain with intercourse (Progressing)       Start:  01/29/25    Expected End:  04/29/25               Pelvic Floor       Perform 10x10 second  Viktor for improved pelvic floor muscle strength & endurance (Progressing)       Start:  01/29/25    Expected End:  04/29/25              DAX PadillaT

## 2025-02-20 NOTE — PROGRESS NOTES
Occupational Therapy Daily Treatment Note     Date: 2/20/2025  Name: Shayna Melchor  Clinic Number: [id number]    Therapy Diagnosis:   1. Right wrist pain        2. Decreased  strength of right hand            Medical Diagnosis: G56.01 (ICD-10-CM) - Carpal tunnel syndrome of right wrist     Referring Physician: Stefanie Rogel PA-C  Physician Orders: Eval and Treat  Note:   3 weeks s/p right eCTR with Dr Mccord on 11/13/24. Please treat and evaluate with right wrist exercises.  Date of Return to MD: 1/16/24     Date of Surgery: 11/13/24  Endoscopic Right sided carpal tunnel release     Evaluation Date: 12/12/2024  Authorization Period: 12/12/24 - 12/31/24  Plan of Care Expiration: 3/6/25  Visit #/ Visits Authorized: 5 of 20  FOTO Completion: Initial eval (12/12/2024)  FOTO #2: 1/3/25  FOTO #3:     Time In: 2:05 pm  Time Out: 2:48 pm   Total Billable Time: 43 min     Precautions: Standard      Subjective     History of Current Condition: Shayna Melchor is a 44 y.o. year old Right hand dominant female who underwent Right endoscopic CTR on 11/13/24.She reports pillar pain and scar tenderness since.  Shayna Melchor is referred to Occupational Therapy for evaluation and treatment. Patient presents today alone.    Pt reports:  sensitive at times about the scar, feelings of weakness, difficulty with lifting heavy and twisting  Shayna Melchor was compliant with home exercise program given last session.   Response to previous treatment: tolerated well  Functional change: none - too soon    Pain: 0/10  Location: right wrist    Objective     Observation/Appearance:   Small scar about volar wrist from endoscopic release, thickened     Sensation: Patient reports complete relief of numbness/tingling since surgery        ELBOW, WRIST RANGE OF MOTION:   Measured in degrees of active motion with goniometer    Right  12/12/2024 Right  1/3/25 Left  12/12/2024   Elbow Extension/Flexion WNL  WNL    Pronation/Supination WNL  WNL   Wrist Extension/Flexion 60/55 65/60 70/75   Ulnar/Radial Deviation 30/25 WNL 30/30         STRENGTH: (Measured in pounds using a Dynamometer and pinch meter)    Right  12/12/2024 Right  1/3/25 Right  2/20/25 Left  12/12/2024    Setting 2 40, 42, 48 33, 44, 46 57, 48, 53 70, 56, 52    Average 43 41 53 59   Key 16.5 16.5 18 18   3 Pt 16.7 13.5 17.5 18.7         Intake Outcome Measure for FOTO Initial Evaluation Survey     Therapist reviewed FOTO scores for Shayna Melchor on 12/12/2024.   FOTO documents entered into [employer] - see Media section.     Intake Score: 61%  1/3/25: 71%           Treatment        Direct contact modalities after being cleared for contraindications: Ultrasound (100%, 3 MHz, 1.2 W/cm2, 8 minutes) used for soft tissue preparation prior to treatment activities applied today to the volar wrist scar.        Olga received the following manual therapy techniques for 10 minutes:   - IASTM and vibration to volar wrist scar and surrounding area       Olga performed therapeutic exercises for 14 minutes including:  - Reassessment /pinch  - Green flexbar rolls (1 min)  - Wrist curls 3 ways, 3# (3 x 10 ea)  - Pro/sup, 3# dowel (30+ reps)  - Median nerve flossing (20 reps)  - PROM comp wrist ext (3 x 30 sec)  - Pink putty , presses and pinches (30 ea) - NT  - Tabletop weightbearing onto rolled up towel (10 reps, 10 sec hold) - NT  - Red power web x 20 reps - NT      Olga participated in dynamic functional therapeutic activities to improve functional performance for 9 minutes, including:  - Isospheres (3 min) - NT  - Flexbar oscillations (2 x 1 min)  - Green pin 3 pt and key pinch foam  (1 set ea)  - Resistive gripper black 2nd spring foam  (1 set)      Home Exercises and Education Provided     Education provided:   - Progress towards goals     Written Home Exercises Provided: Patient instructed to cont prior HEP.  Exercises were reviewed  and Olga was able to demonstrate them prior to the end of the session.  Olga demonstrated good  understanding of the HEP provided.   .   See EMR under Patient Instructions for exercises provided prior visit.        Assessment     Olga tolerated treatment tasks and progression of strengthening well with fatigue by end of session. Overall improvement in  and pinch strength however she reports difficulty with heavy functional tasks. Will progress as tolerated.    Olga is progressing well towards her goals and there are no updates to goals at this time. Pt prognosis is Good. Pt will continue to benefit from skilled outpatient occupational therapy to address the deficits listed in the problem list on initial evaluation provide pt/family education and to maximize pt's level of independence in the home and community environment.     Pt's spiritual, cultural and educational needs considered and pt agreeable to plan of care and goals.    The following goals were discussed with the patient and patient is in agreement with them as to be addressed in the treatment plan.      Long Term Goals (to be met by discharge):  Date Goal Met:      1/3/25 1.) Shayna Melchor will demonstrate significantly improved functional performance from re-assessment as measured by a FOTO Intake score of more than 71.     Goal Status: Met     2.) Shayna Melchor will return to near to prior level of function for ADLs and household management reporting independence or modified independence.     Goal Status:   In progress    2/20/25 3. Shayna Melchor will report pain 2 out of 10 at worst to increase functional use of affected hand for work and leisure tasks.     Goal Status:  Met      Short Term Goals (to be met by 1/15/25):  Date Goal Met:      1/3/25 Shayna Melchor will be independent with home exercise program with written instructions.     Goal Status:  Met     Shayna Melchor will demonstrate 65 degrees of wrist  flex to improve functional performance in ADLs/work/leisure tasks.     Goal Status:   In progress    2/20/25 Shayna Melchor will demonstrate 53 lbs of  strength to improve functional grasp for ADLs/work/leisure tasks.     Goal Status: Met    1/3/25 Shayna Melchor will demonstrate the ability to complete ADL/IADL tasks with 4/10 pain.     Goal Status: Met        Plan   Continue skilled occupational therapy with individualized plan of care focusing on increasing functional independence and participation in meaningful daily activities.    Updates/Grading for next session: progress as tolerated      Judith Corbett, OT

## 2025-02-21 DIAGNOSIS — R10.9 ABDOMINAL CRAMPING: ICD-10-CM

## 2025-02-24 RX ORDER — HYOSCYAMINE SULFATE 0.12 MG/1
0.12 TABLET SUBLINGUAL EVERY 4 HOURS PRN
Qty: 120 TABLET | Refills: 1 | Status: SHIPPED | OUTPATIENT
Start: 2025-02-24 | End: 2025-05-25

## 2025-02-26 ENCOUNTER — PATIENT MESSAGE (OUTPATIENT)
Dept: REHABILITATION | Facility: OTHER | Age: 45
End: 2025-02-26
Payer: OTHER GOVERNMENT

## 2025-02-27 ENCOUNTER — CLINICAL SUPPORT (OUTPATIENT)
Dept: REHABILITATION | Facility: OTHER | Age: 45
End: 2025-02-27
Payer: OTHER GOVERNMENT

## 2025-02-27 DIAGNOSIS — M62.89 PELVIC FLOOR DYSFUNCTION: Primary | ICD-10-CM

## 2025-02-27 DIAGNOSIS — M25.531 RIGHT WRIST PAIN: Primary | ICD-10-CM

## 2025-02-27 DIAGNOSIS — R29.898 DECREASED GRIP STRENGTH OF RIGHT HAND: ICD-10-CM

## 2025-02-27 PROCEDURE — 97018 PARAFFIN BATH THERAPY: CPT | Mod: PN

## 2025-02-27 PROCEDURE — 97530 THERAPEUTIC ACTIVITIES: CPT | Mod: PN

## 2025-02-27 PROCEDURE — 97110 THERAPEUTIC EXERCISES: CPT | Mod: PN

## 2025-02-27 NOTE — PROGRESS NOTES
Outpatient Rehab  Physical Therapy Visit    Patient Name: Shayna Melchor  MRN: [id number]  YOB: 1980  Today's Date: 2/27/2025    Therapy Diagnosis:   Encounter Diagnosis   Name Primary?    Pelvic floor dysfunction Yes       Referring Provider: Rima Vann DO    Visit # / Visits Authorized:  3 / 12  Date of Evaluation:  1/29/2025   Insurance Authorization Period: 12/4/2024 to 12/31/2025  Plan of Care Certification:  1/29/2025 to 4/29/2025     Cancelled Visits: -  No Show Visits: -    Time In:  905  Time Out: 950  Total Time: 45 minutes  Total Billable Time: 45 minutes    Precautions: standard    --------------------------------------------------------  Subjective     Olga reports she got a new office chair and has been focusing on moving a bit more with reminders throughout the day. She felt like her inner thighs felt better after we worked on the last session. Her arthritis flared a little in her right knee this week and it feels slightly swollen. Continue to perform manual release in the shower and has felt good with that technique.    Reports not trying intercourse yet but that is her main priority to get back to.    When back is irritated the pain radiates down the left thigh.      She was compliant with home exercise program.  Response to previous treatment: no adverse effect  Functional change: none reported    Pain: adductor pain no number reported    --------------------------------------------------------  Objective    VAGINAL PELVIC FLOOR EXAM  Exam updated: 1/29/2025        External Assessment  Introitus: WNL  Skin condition: WNL  Scarring: none   Sensation: WNL   Pain: none  Voluntary contraction: visible lift  Voluntary relaxation: visible drop  Bearing down: visible drop         Internal Assessment  Pain: tender areas noted as follows: Superficial transverse perineal some Bulbocavernosus & ischiocavernosus but less   Sensation: able to localized pressure appropriately    Vaginal vault: WNL   Muscle Bulk: WFL   Muscle Power: 4/5  Muscle Endurance: 10 seconds  # Reps To Fatigue: 4                Quality of contraction: slight quivering with holds   Specificity: WNL   Coordination: WNL       Treatment:    Olga received the following interventions during the treatment session:   Pt consents to pelvic floor muscle assessment and treatment.      Therapeutic exercises   []Seated butterfly (supine a little back discomfort)  [x]Supine windshield wipers  [x]Double knees to chest  [x]Piriformis stretch  [x]Single knee to chest with hip in IR    Manual Therapy   []soft tissue mobilization of bilateral adductors and cupping of bilateral adductors    Neuromuscular Re-education   []standing rocks with proprioceptive ball cue at posterior pelvic floor for down-training     Therapeutic activities  [x] Patient education - pt prognosis, PT plan of care, pelvic floor anatomy & function, pelvic floor muscle assessment, and relationship between pelvic floor dysfunction & lumbar spine/hip/pelvic girdle dysfunction, posture & movement considerations for WF  [x] Pelvic floor assessment -   [x] HEP building/HEP review  [x]Desk mobility exercises: seated cat cow, alternating twists(triggered mild back discomfort)      Patient's spiritual, cultural, and educational needs considered and patient agreeable to plan of care and goals.     --------------------------------------------------------  Assessment & Plan   Assessment:  Today's session focused on mat based core, hip & pelvic floor mobility work as well as chair based mobility to help with patient's pain throughout the day. Patient educated on intersection of pain, and ways to continue working on improvements at home.     Olga is progressing well towards her goals.     Exercises & education were reviewed, and Olga was able to demonstrate understanding prior to the end of the session, as needed. See 'Patient Instructions' for exercises/instructions  provided.    Pt prognosis: good    Plan:  Patient will be continually assessed and progressed as appropriate to meet goals and restore function.     Goals:   Active       Pain       Patient will report an 80% reduction in pain with intercourse (Progressing)       Start:  01/29/25    Expected End:  04/29/25               Pelvic Floor       Perform 10x10 second Kegels for improved pelvic floor muscle strength & endurance (Progressing)       Start:  01/29/25    Expected End:  04/29/25                Erendira Meneses DPT

## 2025-02-27 NOTE — PROGRESS NOTES
Occupational Therapy Daily Discharge Note     Date: 2/27/2025  Name: Shayna Melchor  Clinic Number: [id number]    Therapy Diagnosis:   1. Right wrist pain        2. Decreased  strength of right hand              Medical Diagnosis: G56.01 (ICD-10-CM) - Carpal tunnel syndrome of right wrist     Referring Physician: Stefanie Rogel PA-C  Physician Orders: Eval and Treat  Note:   3 weeks s/p right eCTR with Dr Mccord on 11/13/24. Please treat and evaluate with right wrist exercises.  Date of Return to MD: 1/16/24     Date of Surgery: 11/13/24  Endoscopic Right sided carpal tunnel release     Evaluation Date: 12/12/2024  Authorization Period: 12/12/24 - 12/31/24  Plan of Care Expiration: 3/6/25  Visit #/ Visits Authorized: 6 of 20  FOTO Completion: Initial eval (12/12/2024)  FOTO #2: 1/3/25  FOTO #3:     Time In: 10:17 am  Time Out: 10:55 am    Total Billable Time: 38 min     Precautions: Standard      Subjective     History of Current Condition: Shayna Melchor is a 44 y.o. year old Right hand dominant female who underwent Right endoscopic CTR on 11/13/24.She reports pillar pain and scar tenderness since.  Shayna Melchor is referred to Occupational Therapy for evaluation and treatment. Patient presents today alone.    Pt reports:  Feels she is ready for discharge. No reports of pain. Mild difficulty with functional tasks I.e. pouring water out of heavy pot     Shayna Melchor was compliant with home exercise program given last session.   Response to previous treatment: tolerated well  Functional change: able to do functional tasks without difficulty     Pain: 0/10  Location: right wrist    Objective     Observation/Appearance:   Small scar about volar wrist from endoscopic release, thickened     Sensation: Patient reports complete relief of numbness/tingling since surgery        ELBOW, WRIST RANGE OF MOTION:   Measured in degrees of active motion with goniometer    Right  12/12/2024  Right  1/3/25 Left  12/12/2024   Elbow Extension/Flexion WNL  WNL   Pronation/Supination WNL  WNL   Wrist Extension/Flexion 60/55 65/60 70/75   Ulnar/Radial Deviation 30/25 WNL 30/30         STRENGTH: (Measured in pounds using a Dynamometer and pinch meter)    Right  12/12/2024 Right  1/3/25 Right  2/20/25 Left  12/12/2024    Setting 2 40, 42, 48 33, 44, 46 57, 48, 53 70, 56, 52    Average 43 41 53 59   Key 16.5 16.5 18 18   3 Pt 16.7 13.5 17.5 18.7         Intake Outcome Measure for FOTO Initial Evaluation Survey     Therapist reviewed FOTO scores for Shayna Melchor on 12/12/2024.   FOTO documents entered into Monitoring Division - see Media section.     Intake Score: 61%  1/3/25: 71%           Treatment        Direct contact modalities after being cleared for contraindications: Ultrasound (100%, 3 MHz, 1.2 W/cm2, 6 minutes) used for soft tissue preparation prior to treatment activities applied today to the volar wrist scar.   - Paraffin applied to right hand for increased tissue elasticity, blood circulation, and pain management (10 mins)      Olga received the following manual therapy techniques for 5 minutes:   - IASTM and vibration to volar wrist scar and surrounding area       Olga performed therapeutic exercises for 9 minutes including:  - Green flexbar rolls (1 min) - NT performing at home   - Wrist curls 3 ways, 3# (3 x 10 ea)  - Pro/sup, 3# dowel (30+ reps)  - Median nerve flossing (20 reps)  - PROM comp wrist ext (3 x 30 sec)  - Green putty , - NT  - Tabletop weightbearing onto rolled up towel (10 reps, 10 sec hold) - NT  - Red power web x 20 reps - NT      Olga participated in dynamic functional therapeutic activities to improve functional performance for 8 minutes, including:  - Isospheres (3 min) - NT  - Flexbar oscillations (2 x 1 min)  - Green pin 3 pt and key pinch foam  (1 set ea)  - Resistive gripper black 2nd spring foam  (1 set)      Home Exercises and Education Provided      Education provided:   - Progress towards goals     Written Home Exercises Provided: Patient instructed to cont prior HEP.  Exercises were reviewed and Olga was able to demonstrate them prior to the end of the session.  Olga demonstrated good  understanding of the HEP provided.   .   See EMR under Patient Instructions for exercises provided prior visit.        Assessment     Olga tolerated treatment tasks  and progression of strengthening well. Fatigued by end of session.  Reports she is ready for discharge and endorses compliance and independence with HEP. She will continue strengthening at home.   Pt and supervising OT in agreement.   All STG met. 2/3 LTG met.     Olga is progressing well towards her goals and there are no updates to goals at this time. Pt prognosis is Good.   Pt's spiritual, cultural and educational needs considered and pt agreeable to plan of care and goals.    The following goals were discussed with the patient and patient is in agreement with them as to be addressed in the treatment plan.      Long Term Goals (to be met by discharge):  Date Goal Met:      1/3/25 1.) Shayna Melchor will demonstrate significantly improved functional performance from re-assessment as measured by a FOTO Intake score of more than 71.     Goal Status: Met     2.) Shayna Melchor will return to near to prior level of function for ADLs and household management reporting independence or modified independence.     Goal Status:   In progress    2/20/25 3. Shayna Melchor will report pain 2 out of 10 at worst to increase functional use of affected hand for work and leisure tasks.     Goal Status:  Met      Short Term Goals (to be met by 1/15/25):  Date Goal Met:      1/3/25 Shayna Melchor will be independent with home exercise program with written instructions.     Goal Status:  Met     Shayna Melchor will demonstrate 65 degrees of wrist flex to improve functional performance in  ADLs/work/leisure tasks.     Goal Status:   In progress    2/20/25 Shayna Melchor will demonstrate 53 lbs of  strength to improve functional grasp for ADLs/work/leisure tasks.     Goal Status: Met    1/3/25 Shayna Melchor will demonstrate the ability to complete ADL/IADL tasks with 4/10 pain.     Goal Status: Met        Plan   Pt is safe for discharge. Pt and supervising OT in agreement.     SANTOS Childs

## 2025-03-21 ENCOUNTER — CLINICAL SUPPORT (OUTPATIENT)
Dept: REHABILITATION | Facility: OTHER | Age: 45
End: 2025-03-21
Payer: OTHER GOVERNMENT

## 2025-03-21 DIAGNOSIS — M62.89 PELVIC FLOOR DYSFUNCTION: Primary | ICD-10-CM

## 2025-03-21 PROCEDURE — 97112 NEUROMUSCULAR REEDUCATION: CPT | Mod: PN

## 2025-03-21 PROCEDURE — 97530 THERAPEUTIC ACTIVITIES: CPT | Mod: PN

## 2025-03-21 PROCEDURE — 97110 THERAPEUTIC EXERCISES: CPT | Mod: PN

## 2025-03-21 PROCEDURE — 97140 MANUAL THERAPY 1/> REGIONS: CPT | Mod: PN

## 2025-03-21 NOTE — PROGRESS NOTES
Outpatient Rehab  Physical Therapy Visit    Patient Name: Shayna Melchor  MRN: [id number]  YOB: 1980  Today's Date: 3/21/2025    Therapy Diagnosis:   Encounter Diagnosis   Name Primary?    Pelvic floor dysfunction Yes         Referring Provider: Rima Vann DO    Visit # / Visits Authorized:  4 / 12  Date of Evaluation:  1/29/2025   Insurance Authorization Period: 12/4/2024 to 12/31/2025  Plan of Care Certification:  1/29/2025 to 4/29/2025     Cancelled Visits: -  No Show Visits: -    Time In:  335  Time Out: 415  Total Time: 40 minutes  Total Billable Time: 40 minutes    Precautions: standard    --------------------------------------------------------  Subjective     Olga reports she had sex a few times without issues.     Notices that cat cow & twisting motions flared-up her back pain a little bit.     Notices that getting up fully away from her desk has been helpful.    Reports that she still has some pain in the inner thigh/groin, into the SI & low back that at times radiates down the leg.      She was compliant with home exercise program.  Response to previous treatment: no adverse effect  Functional change: none reported    Pain: adductor pain no number reported    --------------------------------------------------------  Objective    VAGINAL PELVIC FLOOR EXAM  Exam updated: 1/29/2025        External Assessment  Introitus: WNL  Skin condition: WNL  Scarring: none   Sensation: WNL   Pain: none  Voluntary contraction: visible lift  Voluntary relaxation: visible drop  Bearing down: visible drop         Internal Assessment  Pain: tender areas noted as follows: Superficial transverse perineal some Bulbocavernosus & ischiocavernosus but less   Sensation: able to localized pressure appropriately   Vaginal vault: WNL   Muscle Bulk: WFL   Muscle Power: 4/5  Muscle Endurance: 10 seconds  # Reps To Fatigue: 4                Quality of contraction: slight quivering with holds   Specificity: WNL    Coordination: WNL       Treatment:    Olga received the following interventions during the treatment session:   Pt consents to pelvic floor muscle assessment and treatment.      Therapeutic exercises   []Seated butterfly (supine a little back discomfort)  [x]Supine supported butterfly  []Supine windshield wipers  []Double knees to chest  [x]Piriformis stretch slight rock  []Open books arm across chest  []Single knee to chest with hip in IR    Manual Therapy   []soft tissue mobilization of bilateral adductors and cupping of bilateral adductors  [x]Modified bro- mob for SI  [x]Soft tissue mobilization left hip & posterior thigh      Neuromuscular Re-education   []standing rocks with proprioceptive ball cue at posterior pelvic floor for down-training   [x]Wall press with pelvic tilt + transverse abdominis + Diaphragmatic Breathing       Therapeutic activities  [x] Patient education - pt prognosis, PT plan of care, pelvic floor anatomy & function, pelvic floor muscle assessment, and relationship between pelvic floor dysfunction & lumbar spine/hip/pelvic girdle dysfunction, posture & movement considerations for WFH  [x] Pelvic floor assessment -   [x] HEP building/HEP review  []Desk mobility exercises: seated cat cow, alternating twists(triggered mild back discomfort)  [x]Kiwi education for managing dyspareunia      Patient's spiritual, cultural, and educational needs considered and patient agreeable to plan of care and goals.     --------------------------------------------------------  Assessment & Plan   Assessment:  patient reports that she was able to sex multiple times without pain which was a significant improvement for her. She reports still having some hip, back and groin pain, which was addressed using manual techniques during today's session. Patient reporting that modified bro- mob was particularly helpful. Followed up with mobility and work to improve muscular recruitment to reduce pain.     Olga  is progressing well towards her goals.     Exercises & education were reviewed, and Olga was able to demonstrate understanding prior to the end of the session, as needed. See 'Patient Instructions' for exercises/instructions provided.    Pt prognosis: good    Plan:  Patient will be continually assessed and progressed as appropriate to meet goals and restore function.     Goals:   Active       Pelvic Floor       Perform 10x10 second Kegels for improved pelvic floor muscle strength & endurance (Progressing)       Start:  01/29/25    Expected End:  04/29/25              Resolved       Pain       Patient will report an 80% reduction in pain with intercourse (Met)       Start:  01/29/25    Expected End:  04/29/25    Resolved:  03/21/25             Erendira Meneses DPT

## 2025-03-21 NOTE — PATIENT INSTRUCTIONS
Wall push with pelvic Tilt    Exhale press feet into the wall as you feel lower belly engage and pelvis slightly tilt  Inhale to reset

## 2025-03-27 ENCOUNTER — CLINICAL SUPPORT (OUTPATIENT)
Dept: REHABILITATION | Facility: OTHER | Age: 45
End: 2025-03-27
Payer: OTHER GOVERNMENT

## 2025-03-27 DIAGNOSIS — M62.89 PELVIC FLOOR DYSFUNCTION: Primary | ICD-10-CM

## 2025-03-27 PROCEDURE — 97530 THERAPEUTIC ACTIVITIES: CPT | Mod: PN

## 2025-03-27 PROCEDURE — 97112 NEUROMUSCULAR REEDUCATION: CPT | Mod: PN

## 2025-03-27 NOTE — PROGRESS NOTES
Outpatient Rehab  Physical Therapy Visit  Discharge    Patient Name: Shayna Melchor  MRN: [id number]  YOB: 1980  Today's Date: 3/27/2025    Therapy Diagnosis:   Encounter Diagnosis   Name Primary?    Pelvic floor dysfunction Yes       Referring Provider: Rima Vann DO    Visit # / Visits Authorized:  4 / 12  Date of Evaluation:  1/29/2025   Insurance Authorization Period: 12/4/2024 to 12/31/2025  Plan of Care Certification:  1/29/2025 to 4/29/2025     Cancelled Visits: -  No Show Visits: -    Time In:  12:30  Time Out: 103  Total Time: 33 minutes  Total Billable Time: 33 minutes    Precautions: standard    --------------------------------------------------------  Subjective     Olga reports still having some pain in the inner thigh and into the back starts when she wakes up and then progressively worsens until 10am. However she feels like overall pelvic floor related symptoms have resolved.      She was compliant with home exercise program.  Response to previous treatment: no adverse effect  Functional change: none reported    Pain: adductor pain no number reported    --------------------------------------------------------  Objective    VAGINAL PELVIC FLOOR EXAM  Exam updated: 1/29/2025        External Assessment  Introitus: WNL  Skin condition: WNL  Scarring: none   Sensation: WNL   Pain: none  Voluntary contraction: visible lift  Voluntary relaxation: visible drop  Bearing down: visible drop         Internal Assessment  Pain: tender areas noted as follows: none reported   Sensation: able to localized pressure appropriately   Vaginal vault: WNL   Muscle Bulk: WFL   Muscle Power: 4/5  Muscle Endurance: 10 seconds  # Reps To Fatigue: 10               Quality of contraction: good consistent contraction with good drop back to baseline  Specificity: WNL   Coordination: WNL       Treatment:    Olga received the following interventions during the treatment session:   Pt consents to pelvic  floor muscle assessment and treatment.      Therapeutic exercises   []Seated butterfly (supine a little back discomfort)  []Supine supported butterfly  []Supine windshield wipers  []Double knees to chest  []Piriformis stretch slight rock  []Open books arm across chest  []Single knee to chest with hip in IR    Manual Therapy   []soft tissue mobilization of bilateral adductors and cupping of bilateral adductors  []Modified bro- mob for SI  []Soft tissue mobilization left hip & posterior thigh      Neuromuscular Re-education   []standing rocks with proprioceptive ball cue at posterior pelvic floor for down-training   []Wall press with pelvic tilt + transverse abdominis + Diaphragmatic Breathing   [x]Endurance hold Kegels      Therapeutic activities  [x] Patient education - pt prognosis, PT plan of care, pelvic floor anatomy & function, pelvic floor muscle assessment, and relationship between pelvic floor dysfunction & lumbar spine/hip/pelvic girdle dysfunction, posture & movement considerations for [facility]  [x] Pelvic floor assessment - good improvements  [x] HEP building/HEP review  []Desk mobility exercises: seated cat cow, alternating twists(triggered mild back discomfort)  []Kiwi education for managing dyspareunia      Patient's spiritual, cultural, and educational needs considered and patient agreeable to plan of care and goals.     --------------------------------------------------------  Assessment & Plan   Assessment:  Olga has made good progress with participation in the POC towards discharge.  Olga has met the majority of her goals and is independent with her home program.  At this time Olga no longer requires skilled intervention and is appropriate for discharge.   Today's session focused on a reassessment of pelvic floor function with no areas of tenderness or tension found & good 4/5 pelvic floor muscles strength with improved endurance of 10x10 seconds with improved coordination.    Discharge reason:  Patient is now asymptomatic and Patient has met all of his/her goals    Discharge FOTO Score: 0    Goals:   Resolved       Pain       Patient will report an 80% reduction in pain with intercourse (Met)       Start:  01/29/25    Expected End:  04/29/25    Resolved:  03/21/25            Pelvic Floor       Perform 10x10 second Kegels for improved pelvic floor muscle strength & endurance (Met)       Start:  01/29/25    Expected End:  04/29/25    Resolved:  03/27/25               Erendira Meneses DPT

## 2025-03-30 ENCOUNTER — PATIENT MESSAGE (OUTPATIENT)
Dept: INTERNAL MEDICINE | Facility: CLINIC | Age: 45
End: 2025-03-30
Payer: OTHER GOVERNMENT

## 2025-04-04 ENCOUNTER — HOSPITAL ENCOUNTER (OUTPATIENT)
Dept: RADIOLOGY | Facility: HOSPITAL | Age: 45
Discharge: HOME | End: 2025-04-04
Attending: PHYSICIAN ASSISTANT
Payer: OTHER GOVERNMENT

## 2025-04-04 ENCOUNTER — OFFICE VISIT (OUTPATIENT)
Dept: ORTHOPEDICS | Facility: CLINIC | Age: 45
End: 2025-04-04
Payer: OTHER GOVERNMENT

## 2025-04-04 VITALS — WEIGHT: 246.5 LBS | HEIGHT: 65 IN | BODY MASS INDEX: 41.07 KG/M2

## 2025-04-04 DIAGNOSIS — M54.16 LUMBAR RADICULOPATHY: ICD-10-CM

## 2025-04-04 DIAGNOSIS — M25.562 LEFT KNEE PAIN, UNSPECIFIED CHRONICITY: ICD-10-CM

## 2025-04-04 DIAGNOSIS — M51.369 DEGENERATION OF INTERVERTEBRAL DISC OF LUMBAR REGION, UNSPECIFIED WHETHER PAIN PRESENT: ICD-10-CM

## 2025-04-04 DIAGNOSIS — M17.11 PRIMARY OSTEOARTHRITIS OF RIGHT KNEE: Primary | ICD-10-CM

## 2025-04-04 DIAGNOSIS — M51.369 DEGENERATION OF INTERVERTEBRAL DISC OF LUMBAR REGION, UNSPECIFIED WHETHER PAIN PRESENT: Primary | ICD-10-CM

## 2025-04-04 PROCEDURE — 73562 X-RAY EXAM OF KNEE 3: CPT | Mod: TC,LT

## 2025-04-04 PROCEDURE — 99214 OFFICE O/P EST MOD 30 MIN: CPT | Mod: PBBFAC,25 | Performed by: PHYSICIAN ASSISTANT

## 2025-04-04 PROCEDURE — 73562 X-RAY EXAM OF KNEE 3: CPT | Mod: 26,LT,, | Performed by: RADIOLOGY

## 2025-04-04 PROCEDURE — 99999 PR PBB SHADOW E&M-EST. PATIENT-LVL IV: CPT | Mod: PBBFAC,,, | Performed by: PHYSICIAN ASSISTANT

## 2025-04-04 PROCEDURE — 72110 X-RAY EXAM L-2 SPINE 4/>VWS: CPT | Mod: 26,,, | Performed by: RADIOLOGY

## 2025-04-04 PROCEDURE — 73560 X-RAY EXAM OF KNEE 1 OR 2: CPT | Mod: 26,59,RT, | Performed by: RADIOLOGY

## 2025-04-04 PROCEDURE — 72110 X-RAY EXAM L-2 SPINE 4/>VWS: CPT | Mod: TC

## 2025-04-04 RX ORDER — METHOCARBAMOL 500 MG/1
500 TABLET, FILM COATED ORAL 4 TIMES DAILY
Qty: 40 TABLET | Refills: 0 | Status: SHIPPED | OUTPATIENT
Start: 2025-04-04

## 2025-04-04 NOTE — PROGRESS NOTES
SUBJECTIVE:     Chief Complaint & History of Present Illness:  Shayna Melchor is a Established patient 45 y.o. female who is seen here today with a complaint of    Chief Complaint   Patient presents with    Left Knee - Pain    Right Knee - Pain    Lower Back - Pain    . History of Present Illness    - Knee pain and lower back pain    - Presents for follow-up of knee and lower back pain  - Knee pain associated with frequent popping in and out of the joint  - Burning sensation in the knee has resolved since performing prescribed exercises  - Lower back pain located in SI joints and lower area, radiating down one side  - Back pain present throughout life, worsened during pregnancy and persisted thereafter  - History of mild scoliosis diagnosed in teenage years  - Pain in both knee and back improves with meloxicam use  - Feels fine when taking meloxicam as prescribed (7 days on, then a break)  - Pain returns the next day when off medication  - Sitting for prolonged periods at work exacerbates back pain  - Occasionally experiences acute back pain  - Recalls joint separation during pregnancy, described as extremely painful  - Expresses concern about frequent use of oral pain medications, specifically potential liver damage from regular ibuprofen use  - Denies any formal medical diagnoses    - Straight leg raise exercises: Helped alleviate the burning sensation in the knee  - Cortisone shot in the knee: Provided temporary relief  - Pelvic floor therapy: Completed    - Works at a desk job  - Sits in a chair all day long in front of a desk  - Tries to get up and move around periodically       On a scale of 1-10, with 10 being worst pain imaginable, he rates this pain as 3 on good days and 7 on bad days.  she describes the pain as sore and achy.    Review of patient's allergies indicates:   Allergen Reactions    Benadryl allergy-sinus Palpitations     tachycardia    Ciprofloxacin Hives, Shortness Of Breath and Other  (See Comments)     hives    Diphenhydramine Shortness Of Breath, Palpitations and Other (See Comments)     Other Reaction(s): Other        Fish containing products Other (See Comments), Anaphylaxis, Hives and Shortness Of Breath     White fish    Any type of fish.    Latex, natural rubber Hives    Nickel Hives and Rash         Current Medications[1]    Past Medical History:   Diagnosis Date    ADHD (attention deficit hyperactivity disorder)     Depression     Hyperlipidemia     Hypertension     EHSAN (obstructive sleep apnea)     wears cpap    PTSD (post-traumatic stress disorder)        Past Surgical History:   Procedure Laterality Date     SECTION  2015, 2017    ENDOSCOPIC CARPAL TUNNEL RELEASE Right 2024    Procedure: RELEASE, CARPAL TUNNEL, ENDOSCOPIC - right;  Surgeon: Jamey Mccord MD;  Location: [facility] OR;  Service: Orthopedics;  Laterality: Right;    LAPAROSCOPIC SALPINGECTOMY Right 10/23/2024    Procedure: SALPINGECTOMY, LAPAROSCOPIC;  Surgeon: Rima Vann DO;  Location: [facility], [place], operated by [facility] OR;  Service: OB/GYN;  Laterality: Right;    LAPAROSCOPIC TOTAL HYSTERECTOMY N/A 10/23/2024    Procedure: HYSTERECTOMY, TOTAL, LAPAROSCOPIC;  Surgeon: Rima Vann DO;  Location: [facility], [place], operated by [facility] OR;  Service: OB/GYN;  Laterality: N/A;    SINUS SURGERY  2009    deviated septum    TUBAL LIGATION  2017       Vital Signs (Most Recent)  There were no vitals filed for this visit.        Review of Systems:  ROS:  Constitutional: no fever or chills, positive structure sleep apnea, postural imbalance  Eyes: no visual changes  ENT: no nasal congestion or sore throat  Respiratory: no cough or shortness of breath, positive for asthma  Cardiovascular: no chest pain or palpitations, positive for hypertension  Gastrointestinal: no nausea or vomiting, tolerating diet, positive esophageal reflux functional diarrhea  Genitourinary: no hematuria or dysuria  Integument/Breast: no rash or pruritis  Hematologic/Lymphatic: no  "easy bruising or lymphadenopathy  Musculoskeletal: no arthralgias or myalgias, decreased range of motion of the trunk and back decreased strength of the trunk and back  Neurological: no seizures or tremors, positive for migraine, degenerative disc disease lumbar spine, carpal tunnel syndrome of the right wrist  Behavioral/Psych: no auditory or visual hallucinations, positive ADHD, panic disorder, posttraumatic stress disorder, major depressive disorder  Endocrine: no heat or cold intolerance              OBJECTIVE:     PHYSICAL EXAM:  Height: 5' 5" (165.1 cm) Weight: 111.8 kg (246 lb 7.6 oz), General Appearance: Well nourished, well developed, in no acute distress.  Neurological: Mood & affect are normal.     right  Knee Exam:  Knee Range of Motion:0-120 degrees flexion   Effusion:none  Condition of skin:intact  Location of tenderness:Medial joint line and Patellar tendon   Strength:5 of 5  Stability:  Lachman: stable, LCL: stable, MCL: stable, PCL: stable, and posteromedial (dial): stable  Varus /Valgus stress:  normal  Kimberli:   negative/negative     left  Knee Exam:  Knee Range of Motion:0-120 degrees flexion   Effusion:none  Condition of skin:intact  Location of tenderness:Medial joint line   Strength:5 of 5  Stability:  Lachman: stable, LCL: stable, MCL: stable, PCL: stable, and posteromedial (dial): stable  Varus /Valgus stress:  normal  Kimberli:   negative/negative        Hip Examination:  full painless range of motion, without tenderness     RADIOGRAPHS:  X-rays the knees from previous visit reviewed by me today demonstrate mild arthritic changes throughout both knees right more so than left with medial joint space narrowing sclerotic changes noted in the medial compartments bilaterally right more so than left no osteophytic spurring no fracture dislocation or other bony abnormalities    ASSESSMENT/PLAN:       ICD-10-CM ICD-9-CM   1. Primary osteoarthritis of right knee  M17.11 715.16   2. Lumbar " radiculopathy  M54.16 724.4       Plan: We discussed with the patient at length all the different treatment options available for  the knee including anti-inflammatories, acetaminophen, rest, ice, knee strengthening exercise, occasional cortisone injections for temporary relief, Viscosupplimentation injections, arthroscopic debridement osteotomy, and finally knee arthroplasty.   Assessment & Plan    M17.0 Bilateral primary osteoarthritis of knee  M54.50 Low back pain, unspecified  M51.34 Other intervertebral disc degeneration, thoracic region  M25.78 Osteophyte, vertebrae  M53.3 Sacrococcygeal disorders, not elsewhere classified  Z56.6 Other physical and mental strain related to work    MEDICATIONS:  - Muscle relaxers (non-drowsy type) for back pain. Take for a day or two to quiet symptoms.    PROCEDURES:  - # Procedures  - Gel shots for knee recommended.  - Procedure involves 3 shots, 1 per week for 3 weeks.  - Improvement typically starts after the 2nd week.  - Insurance approval required before scheduling.  - Knee injection procedure typically less painful than anticipated.    FOLLOW UP:  - Follow up next week for gel shot procedure.              [1]   Current Outpatient Medications   Medication Sig Dispense Refill    acetaminophen (TYLENOL) 500 MG tablet Take 2 tablets (1,000 mg total) by mouth every 8 (eight) hours as needed for Pain. 42 tablet 0    albuterol (PROAIR HFA) 90 mcg/actuation inhaler       azelastine (ASTELIN) 137 mcg (0.1 %) nasal spray 2 sprays (274 mcg total) by Nasal route 2 (two) times daily. 30 mL 11    carvediloL (COREG) 6.25 MG tablet Take 1 tablet (6.25 mg total) by mouth 2 (two) times daily with meals. 180 tablet 3    clonazePAM (KLONOPIN) 1 MG tablet Take 1 tablet (1 mg total) by mouth every evening. 30 tablet 3    EPINEPHrine (EPIPEN 2-TRINITY) 0.3 mg/0.3 mL AtIn Inject 0.3 mLs (0.3 mg total) into the muscle once. for 1 dose 0.3 mL 6    esomeprazole (NEXIUM) 40 MG capsule Take 1 capsule (40  mg total) by mouth before breakfast. 90 capsule 3    famotidine (PEPCID) 20 MG tablet Take 1 tablet (20 mg total) by mouth every evening. 30 tablet 11    hyoscyamine 0.125 mg Subl Place 1 tablet (0.125 mg total) under the tongue every 4 (four) hours as needed (abdominal cramping). 120 tablet 1    ibuprofen (ADVIL,MOTRIN) 600 MG tablet Take 1 tablet (600 mg total) by mouth every 8 (eight) hours as needed for Pain. 21 tablet 0    meloxicam (MOBIC) 15 MG tablet Take 1 tablet (15 mg total) by mouth once daily. 30 tablet 1    methocarbamoL (ROBAXIN) 500 MG Tab Take 1 tablet (500 mg total) by mouth 4 (four) times daily. 40 tablet 0    montelukast (SINGULAIR) 10 mg tablet Take 1 tablet (10 mg total) by mouth every evening. 90 tablet 3    omega-3s/dha/epa/algal oil (MEGARED ADVANCED OMEGA-3 ALGAE ORAL) Algae based omega 3.  IWi one po daily.      ondansetron (ZOFRAN) 8 MG tablet TK 1 T PO BID PRN      telmisartan (MICARDIS) 80 MG Tab Take 1 tablet (80 mg total) by mouth once daily. 90 tablet 3    vilazodone (VIIBRYD) 10 mg Tab tablet Take 1 tablet (10 mg total) by mouth 2 (two) times a day. 60 tablet 2     Current Facility-Administered Medications   Medication Dose Route Frequency Provider Last Rate Last Admin    sodium hyaluronate (EUFLEXXA) 10 mg/mL(mw 2.4 -3.6 million) injection 20 mg  20 mg Intra-articular Weekly Castro Han PA-C

## 2025-04-09 ENCOUNTER — OFFICE VISIT (OUTPATIENT)
Dept: INTERNAL MEDICINE | Facility: CLINIC | Age: 45
End: 2025-04-09
Payer: OTHER GOVERNMENT

## 2025-04-09 DIAGNOSIS — J45.20 ASTHMA IN ADULT, MILD INTERMITTENT, UNCOMPLICATED: Primary | ICD-10-CM

## 2025-04-09 PROCEDURE — 98004 SYNCH AUDIO-VIDEO EST SF 10: CPT | Mod: 95,,, | Performed by: PHYSICIAN ASSISTANT

## 2025-04-09 RX ORDER — ALBUTEROL SULFATE 90 UG/1
2 INHALANT RESPIRATORY (INHALATION) EVERY 4 HOURS PRN
Qty: 36 G | Refills: 3 | Status: SHIPPED | OUTPATIENT
Start: 2025-04-09

## 2025-04-09 NOTE — PROGRESS NOTES
Subjective:       Patient ID: Shayna Melchor is a 45 y.o. female.        Chief Complaint: Follow-up      The patient location is: her home  The chief complaint leading to consultation is: needs inhaler refilled    Visit type: audiovisual    Face to Face time with patient: 7 minutes  10 minutes of total time spent on the encounter, which includes face to face time and non-face to face time preparing to see the patient (eg, review of tests), Obtaining and/or reviewing separately obtained history, Documenting clinical information in the electronic or other health record, Independently interpreting results (not separately reported) and communicating results to the patient/family/caregiver, or Care coordination (not separately reported).         Each patient to whom he or she provides medical services by telemedicine is:  (1) informed of the relationship between the physician and patient and the respective role of any other health care provider with respect to management of the patient; and (2) notified that he or she may decline to receive medical services by telemedicine and may withdraw from such care at any time.    Notes:   Allergic to pollen, oak  Had asthma attack 2 weeks ago  Realized she needed a new inhaler    Typically allergy or exercise induced    Rare use of albuterol           Review of Systems   Constitutional:  Positive for activity change. Negative for chills, diaphoresis, fatigue, fever and unexpected weight change.   HENT:  Positive for rhinorrhea. Negative for congestion, hearing loss, sore throat and trouble swallowing.    Eyes:  Positive for discharge. Negative for visual disturbance.   Respiratory:  Positive for wheezing. Negative for cough, chest tightness and shortness of breath.    Cardiovascular:  Negative for chest pain, palpitations and leg swelling.   Gastrointestinal:  Negative for abdominal pain, blood in stool, constipation, diarrhea, nausea and vomiting.   Endocrine: Negative for  "polydipsia and polyuria.   Genitourinary:  Negative for difficulty urinating, dysuria, frequency, hematuria, menstrual problem and urgency.   Musculoskeletal:  Positive for joint swelling. Negative for arthralgias, back pain and neck pain.   Skin:  Negative for rash.   Neurological:  Negative for dizziness, syncope, weakness and headaches.   Psychiatric/Behavioral:  Negative for confusion, dysphoric mood and sleep disturbance. The patient is not nervous/anxious.        Objective:      Physical Exam  Pulmonary:      Effort: Pulmonary effort is normal.   Neurological:      General: No focal deficit present.      Mental Status: She is alert.   Psychiatric:         Mood and Affect: Mood normal.         Assessment:       1. Asthma in adult, mild intermittent, uncomplicated        Plan:       Shayna Peralta" was seen today for follow-up.    Diagnoses and all orders for this visit:    Asthma in adult, mild intermittent, uncomplicated  -     albuterol (PROAIR HFA) 90 mcg/actuation inhaler; Inhale 2 puffs into the lungs every 4 (four) hours as needed for Wheezing. Rescue    Refilled inhaler  Uses rarely    Pt has been given instructions populated from patient instructions database and has verbalized understanding of the after visit summary and information contained wherein.    Follow up with a primary care provider. May go to ER for acute shortness of breath, lightheadedness, fever, or any other emergent complaints or changes in condition.    "This note will be shared with the patient"    Future Appointments   Date Time Provider Department Center   4/17/2025  9:30 AM Castro Han PA-C [facility]THO [address]   4/17/2025 11:00 AM [id number]   4/25/2025 11:00 AM Castro Han PA-C [facility] [address]   5/1/2025  8:30 AM Amber Barbosa PA-C [facility] [address]   5/16/2025 11:30 AM Stefanie Baltazar MD [facility] IM [address] [facility]   6/4/2025 11:30 AM Chip Jose PA-C " [facility] SLEEP [facility]   7/30/2025  2:45 PM Rosaura Lance MD [facility]

## 2025-04-11 ENCOUNTER — OFFICE VISIT (OUTPATIENT)
Dept: ORTHOPEDICS | Facility: CLINIC | Age: 45
End: 2025-04-11
Payer: OTHER GOVERNMENT

## 2025-04-11 VITALS — WEIGHT: 244.69 LBS | BODY MASS INDEX: 40.77 KG/M2 | HEIGHT: 65 IN

## 2025-04-11 DIAGNOSIS — M17.0 PRIMARY OSTEOARTHRITIS OF BOTH KNEES: Primary | ICD-10-CM

## 2025-04-11 PROCEDURE — 99213 OFFICE O/P EST LOW 20 MIN: CPT | Mod: PBBFAC | Performed by: PHYSICIAN ASSISTANT

## 2025-04-11 PROCEDURE — 99999 PR PBB SHADOW E&M-EST. PATIENT-LVL III: CPT | Mod: PBBFAC,,, | Performed by: PHYSICIAN ASSISTANT

## 2025-04-11 NOTE — PROGRESS NOTES
Shayna Melchor is a 45 y.o. year old her here today for her 1st Euflexxa injection for degenerative changes of her bilateral knee . she was last seen and treated in the clinic on 4/4/2025. There has been no significant change in her medical status since her last visit. No Fever, chills, malaise, or unexplained weight change.      Allergies, Medications, past medical and surgical history were reviewed .    Examination of the knee demonstrates  No evidence of edema, erythema , echymosis strength and range of motion are unchanged from previous visit.    The risks, benefits, pros, cons, and potential side effects of the procedure were discussed with the patient in detail all questions were answered.  The patient is comfortable and willing to proceed with the procedure. Verbal consent was obtained and the proper joint was identified by the patient and provider.     The injection site was identified and the skin was prepared with a betadine solution. The  bilateral knee  joint was injected with 2 ml of Euflexxa solution under sterile technique. Shayna Melchor tolerated the procedure well, she was advised to rest the knee today, ice and elevation. I may take 3 -6 weeks following the last injection to get the full benefit of the medication.  I will see her back in 1 week. Sooner if he has any problems or concerns.           .     ICD-10-CM ICD-9-CM   1. Primary osteoarthritis of both knees  M17.0 715.16

## 2025-04-14 ENCOUNTER — PATIENT MESSAGE (OUTPATIENT)
Dept: ORTHOPEDICS | Facility: CLINIC | Age: 45
End: 2025-04-14
Payer: OTHER GOVERNMENT

## 2025-04-17 ENCOUNTER — OFFICE VISIT (OUTPATIENT)
Dept: ORTHOPEDICS | Facility: CLINIC | Age: 45
End: 2025-04-17
Payer: OTHER GOVERNMENT

## 2025-04-17 ENCOUNTER — HOSPITAL ENCOUNTER (OUTPATIENT)
Dept: RADIOLOGY | Facility: HOSPITAL | Age: 45
Discharge: HOME | End: 2025-04-17
Attending: INTERNAL MEDICINE
Payer: OTHER GOVERNMENT

## 2025-04-17 DIAGNOSIS — M17.0 PRIMARY OSTEOARTHRITIS OF BOTH KNEES: Primary | ICD-10-CM

## 2025-04-17 DIAGNOSIS — Z12.31 ENCOUNTER FOR SCREENING MAMMOGRAM FOR BREAST CANCER: ICD-10-CM

## 2025-04-17 PROCEDURE — 99213 OFFICE O/P EST LOW 20 MIN: CPT | Mod: PBBFAC | Performed by: PHYSICIAN ASSISTANT

## 2025-04-17 PROCEDURE — 99999 PR PBB SHADOW E&M-EST. PATIENT-LVL III: CPT | Mod: PBBFAC,,, | Performed by: PHYSICIAN ASSISTANT

## 2025-04-17 PROCEDURE — 99999PBSHW PR PBB SHADOW TECHNICAL ONLY FILED TO HB: Mod: JZ,PBBFAC,,

## 2025-04-17 PROCEDURE — 20610 DRAIN/INJ JOINT/BURSA W/O US: CPT | Mod: 50,PBBFAC | Performed by: PHYSICIAN ASSISTANT

## 2025-04-17 PROCEDURE — 99499 UNLISTED E&M SERVICE: CPT | Mod: S$PBB,,, | Performed by: PHYSICIAN ASSISTANT

## 2025-04-17 PROCEDURE — 20610 DRAIN/INJ JOINT/BURSA W/O US: CPT | Mod: 50,S$PBB,, | Performed by: PHYSICIAN ASSISTANT

## 2025-04-17 PROCEDURE — 77067 SCR MAMMO BI INCL CAD: CPT | Mod: TC

## 2025-04-17 RX ADMIN — Medication 40 MG: at 09:04

## 2025-04-17 NOTE — PROGRESS NOTES
Shayna Melchor is a 45 y.o. year old her here today for her 2nd Euflexxa injection for degenerative changes of her bilateral knee . she was last seen and treated in the clinic on 4/11/2025. There has been no significant change in her medical status since her last visit. No Fever, chills, malaise, or unexplained weight change.      Allergies, Medications, past medical and surgical history were reviewed .    Examination of the knee demonstrates  No evidence of edema, erythema , echymosis strength and range of motion are unchanged from previous visit.    The risks, benefits, pros, cons, and potential side effects of the procedure were discussed with the patient in detail all questions were answered.  The patient is comfortable and willing to proceed with the procedure. Verbal consent was obtained and the proper joint was identified by the patient and provider.     The injection site was identified and the skin was prepared with a betadine solution. The  bilateral knee  joint was injected with 2 ml of Euflexxa solution under sterile technique. Shayna Melchor tolerated the procedure well, she was advised to rest the knee today, ice and elevation. I may take 3 -6 weeks following the last injection to get the full benefit of the medication.  I will see her back in 1 week. Sooner if he has any problems or concerns.           .     ICD-10-CM ICD-9-CM   1. Primary osteoarthritis of both knees  M17.0 715.16

## 2025-04-22 ENCOUNTER — RESULTS FOLLOW-UP (OUTPATIENT)
Dept: INTERNAL MEDICINE | Facility: CLINIC | Age: 45
End: 2025-04-22

## 2025-04-25 ENCOUNTER — OFFICE VISIT (OUTPATIENT)
Dept: ORTHOPEDICS | Facility: CLINIC | Age: 45
End: 2025-04-25
Payer: OTHER GOVERNMENT

## 2025-04-25 VITALS — WEIGHT: 244.69 LBS | HEIGHT: 65 IN | BODY MASS INDEX: 40.77 KG/M2

## 2025-04-25 DIAGNOSIS — M17.0 PRIMARY OSTEOARTHRITIS OF BOTH KNEES: Primary | ICD-10-CM

## 2025-04-25 PROCEDURE — 99213 OFFICE O/P EST LOW 20 MIN: CPT | Mod: PBBFAC | Performed by: PHYSICIAN ASSISTANT

## 2025-04-25 PROCEDURE — 99999 PR PBB SHADOW E&M-EST. PATIENT-LVL III: CPT | Mod: PBBFAC,,, | Performed by: PHYSICIAN ASSISTANT

## 2025-04-25 RX ADMIN — Medication 40 MG: at 10:04

## 2025-04-25 NOTE — PROGRESS NOTES
Shayna Melchor is a 45 y.o. year old her here today for her 3rd Euflexxa injection for degenerative changes of her bilateral knee . she was last seen and treated in the clinic on 4/17/2025. There has been no significant change in her medical status since her last visit. No Fever, chills, malaise, or unexplained weight change.      Allergies, Medications, past medical and surgical history were reviewed .    Examination of the knee demonstrates  No evidence of edema, erythema , echymosis strength and range of motion are unchanged from previous visit.    The risks, benefits, pros, cons, and potential side effects of the procedure were discussed with the patient in detail all questions were answered.  The patient is comfortable and willing to proceed with the procedure. Verbal consent was obtained and the proper joint was identified by the patient and provider.     The injection site was identified and the skin was prepared with a chlorhexidine solution. The  bilateral knee  joint was injected with 2 ml of Euflexxa solution under sterile technique. Shayna Melchor tolerated the procedure well, she was advised to rest the knee today, ice and elevation. I may take 3 -6 weeks following the last injection to get the full benefit of the medication.  I will see her back in 6 months. Sooner if he has any problems or concerns.           .     ICD-10-CM ICD-9-CM   1. Primary osteoarthritis of both knees  M17.0 715.16

## 2025-04-25 NOTE — PROGRESS NOTES
----- Message from Lucy Cao sent at 9/7/2023  8:52 AM CDT -----  Patient is 15 weeks pregnant and is spotting. Please contact patient at [phone].         DATE: 2025  PATIENT: Shayna Melchor    Supervising Physician: Harpreet Newman M.D.    CHIEF COMPLAINT: back pain    HISTORY:  Shayna Melchor is a 45 y.o. female here for initial evaluation of low back and intermittent left posterior leg pain (Back - 2, Leg - 0).  The pain in the left low back is what bothers her most.  The pain has been present for years. The patient describes the pain as dull and aching.  The pain is worse with sitting and improved by medications. There is associated numbness and tingling. There is no subjective weakness. Prior treatments have included the healthy back program, mobic, robaxin, and advil, but no RUBEN or surgery.  The patient denies myelopathic symptoms such as handwriting changes or difficulty with buttons/coins/keys. Denies perineal paresthesias, bowel/bladder dysfunction.    PAST MEDICAL/SURGICAL HISTORY:  Past Medical History:   Diagnosis Date    ADHD (attention deficit hyperactivity disorder)     Depression     Hyperlipidemia     Hypertension     EHSAN (obstructive sleep apnea)     wears cpap    PTSD (post-traumatic stress disorder)      Past Surgical History:   Procedure Laterality Date     SECTION  2015, 2017    ENDOSCOPIC CARPAL TUNNEL RELEASE Right 2024    Procedure: RELEASE, CARPAL TUNNEL, ENDOSCOPIC - right;  Surgeon: Jamey Mccord MD;  Location: [facility] OR;  Service: Orthopedics;  Laterality: Right;    LAPAROSCOPIC SALPINGECTOMY Right 10/23/2024    Procedure: SALPINGECTOMY, LAPAROSCOPIC;  Surgeon: Rima Vann DO;  Location: [facility] OR;  Service: OB/GYN;  Laterality: Right;    LAPAROSCOPIC TOTAL HYSTERECTOMY N/A 10/23/2024    Procedure: HYSTERECTOMY, TOTAL, LAPAROSCOPIC;  Surgeon: Rima Vann DO;  Location: [facility] OR;  Service: OB/GYN;  Laterality: N/A;    SINUS SURGERY  2009    deviated septum    TUBAL LIGATION  2017       Medications:   Medications Ordered Prior to Encounter[1]    Social History: Social  "History[2]    REVIEW OF SYSTEMS:  Constitution: Negative. Negative for chills, fever and night sweats.   Cardiovascular: Negative for chest pain and syncope.   Respiratory: Negative for cough and shortness of breath.   Gastrointestinal: See HPI. Negative for nausea/vomiting. Negative for abdominal pain.  Genitourinary: See HPI. Negative for discoloration or dysuria.  Skin: Negative for dry skin, itching and rash.   Hematologic/Lymphatic: Negative for bleeding problem. Does not bruise/bleed easily.   Musculoskeletal: Negative for falls and muscle weakness.   Neurological: See HPI. No seizures.   Endocrine: Negative for polydipsia, polyphagia and polyuria.   Allergic/Immunologic: Negative for hives and persistent infections.     EXAM:  Ht 5' 5" (1.651 m)   Wt 112.2 kg (247 lb 5.7 oz)   LMP 10/01/2024 (Approximate) Comment: speci cup given to patient for dos  BMI 41.16 kg/m²     General: The patient is a 45 y.o. female in no apparent distress, the patient is oriented to person, place and time.  Psych: Normal mood and affect  HEENT: Vision grossly intact, hearing intact to the spoken word.  Lungs: Respirations unlabored.  Gait: Normal station and gait, no difficulty with toe or heel walk.   Skin: Dorsal lumbar skin negative for rashes, lesions, hairy patches and surgical scars. There is mild lumbar tenderness to palpation.  Range of motion: Lumbar range of motion is acceptable.  Spinal Balance: Global saggital and coronal spinal balance acceptable, not significant for scoliosis and kyphosis.  Musculoskeletal: No pain with the range of motion of the bilateral hips. No trochanteric tenderness to palpation.  Vascular: Bilateral lower extremities warm and well perfused, dorsalis pedis pulses 2+ bilaterally.  Neurological: Normal strength and tone in all major motor groups in the bilateral lower extremities. Normal sensation to light touch in the L2-S1 dermatomes bilaterally.  Deep tendon reflexes symmetric 2+ in the " "bilateral lower extremities.  Negative Babinski bilaterally. Straight leg raise negative bilaterally.    IMAGING:   Today I personally reviewed AP, Lat and Flex/Ex  upright L-spine films that demonstrate mild curve. Mild DDD. Hip discrepancy.      Body mass index is 41.16 kg/m².    Hemoglobin A1C   Date Value Ref Range Status   02/29/2024 5.2 4.0 - 5.6 % Final     Comment:     ADA Screening Guidelines:  5.7-6.4%  Consistent with prediabetes  >or=6.5%  Consistent with diabetes    High levels of fetal hemoglobin interfere with the HbA1C  assay. Heterozygous hemoglobin variants (HbS, HgC, etc)do  not significantly interfere with this assay.   However, presence of multiple variants may affect accuracy.             ASSESSMENT/PLAN:    Shayna Peralta" was seen today for low-back pain and back pain.    Diagnoses and all orders for this visit:    Degeneration of intervertebral disc of lumbar region with discogenic back pain and lower extremity pain  -     Ambulatory referral/consult to [facility]; Future  -     meloxicam (MOBIC) 15 MG tablet; Take 1 tablet (15 mg total) by mouth once daily.  -     methocarbamoL (ROBAXIN) 500 MG Tab; Take 1-3 tablets (500-1,500 mg total) by mouth 4 (four) times daily.      Today we discussed at length all of the different treatment options including anti-inflammatories, acetaminophen, rest, ice, heat, physical therapy including strengthening and stretching exercises, home exercises, ROM, aerobic conditioning, aqua therapy, other modalities including ultrasound, massage, and dry needling, epidural steroid injections and finally surgical intervention.      Healthy back program referral placed. Follow up in 3 months          [1]   Current Outpatient Medications on File Prior to Visit   Medication Sig Dispense Refill    albuterol (PROAIR HFA) 90 mcg/actuation inhaler Inhale 2 puffs into the lungs every 4 (four) hours as needed for Wheezing. Rescue 36 g 3    carvediloL (COREG) " 6.25 MG tablet Take 1 tablet (6.25 mg total) by mouth 2 (two) times daily with meals. 180 tablet 3    clonazePAM (KLONOPIN) 1 MG tablet Take 1 tablet (1 mg total) by mouth every evening. 30 tablet 3    esomeprazole (NEXIUM) 40 MG capsule Take 1 capsule (40 mg total) by mouth before breakfast. 90 capsule 3    famotidine (PEPCID) 20 MG tablet Take 1 tablet (20 mg total) by mouth every evening. 30 tablet 11    FLUoxetine 10 MG capsule Take 1 capsule (10 mg) once daily for one week prior to menstrual cycle for PMDD 30 capsule 0    hyoscyamine 0.125 mg Subl Place 1 tablet (0.125 mg total) under the tongue every 4 (four) hours as needed (abdominal cramping). 120 tablet 1    montelukast (SINGULAIR) 10 mg tablet Take 1 tablet (10 mg total) by mouth every evening. 90 tablet 3    omega-3s/dha/epa/algal oil (MEGARED ADVANCED OMEGA-3 ALGAE ORAL) Algae based omega 3.  IWi one po daily.      ondansetron (ZOFRAN) 8 MG tablet TK 1 T PO BID PRN      telmisartan (MICARDIS) 80 MG Tab Take 1 tablet (80 mg total) by mouth once daily. 90 tablet 3    vilazodone (VIIBRYD) 10 mg Tab tablet Take 1 tablet (10 mg total) by mouth 2 (two) times a day. 60 tablet 2    [DISCONTINUED] meloxicam (MOBIC) 15 MG tablet Take 1 tablet (15 mg total) by mouth once daily. 30 tablet 1    [DISCONTINUED] methocarbamoL (ROBAXIN) 500 MG Tab Take 1 tablet (500 mg total) by mouth 4 (four) times daily. 40 tablet 0    azelastine (ASTELIN) 137 mcg (0.1 %) nasal spray 2 sprays (274 mcg total) by Nasal route 2 (two) times daily. 30 mL 11    EPINEPHrine (EPIPEN 2-TRINITY) 0.3 mg/0.3 mL AtIn Inject 0.3 mLs (0.3 mg total) into the muscle once. for 1 dose 0.3 mL 6     No current facility-administered medications on file prior to visit.   [2]   Social History  Socioeconomic History    Marital status:    Occupational History    Occupation: therapist     Comment:  mental health   Tobacco Use    Smoking status: Never    Smokeless tobacco: Never   Substance and  Sexual Activity    Alcohol use: Not Currently     Alcohol/week: 2.0 standard drinks of alcohol     Types: 2 Glasses of wine per week    Drug use: Never    Sexual activity: Yes     Partners: Male     Birth control/protection: See Surgical Hx     Social Drivers of Health     Financial Resource Strain: Low Risk  (4/2/2025)    Overall Financial Resource Strain (CARDIA)     Difficulty of Paying Living Expenses: Not very hard   Food Insecurity: No Food Insecurity (4/2/2025)    Hunger Vital Sign     Worried About Running Out of Food in the Last Year: Never true     Ran Out of Food in the Last Year: Never true   Transportation Needs: No Transportation Needs (4/2/2025)    PRAPARE - Transportation     Lack of Transportation (Medical): No     Lack of Transportation (Non-Medical): No   Physical Activity: Inactive (4/2/2025)    Exercise Vital Sign     Days of Exercise per Week: 0 days     Minutes of Exercise per Session: 0 min   Stress: Stress Concern Present (4/2/2025)    [employer] [place] [employer] - Occupational Stress Questionnaire     Feeling of Stress : To some extent   Housing Stability: Low Risk  (4/2/2025)    Housing Stability Vital Sign     Unable to Pay for Housing in the Last Year: No     Number of Times Moved in the Last Year: 1     Homeless in the Last Year: No

## 2025-04-29 NOTE — PROGRESS NOTES
"The patient location is: [place], [place]  The chief complaint leading to consultation is: f/u    Visit type: audiovisual    Face to Face time with patient: 20  25 minutes of total time spent on the encounter, which includes face to face time and non-face to face time preparing to see the patient (eg, review of tests), Obtaining and/or reviewing separately obtained history, Documenting clinical information in the electronic or other health record, Independently interpreting results (not separately reported) and communicating results to the patient/family/caregiver, or Care coordination (not separately reported).         Each patient to whom he or she provides medical services by telemedicine is:  (1) informed of the relationship between the physician and patient and the respective role of any other health care provider with respect to management of the patient; and (2) notified that he or she may decline to receive medical services by telemedicine and may withdraw from such care at any time.    Notes:     Outpatient Psychiatry Follow-Up Visit (PA)    04/29/2025    Clinical Status of Patient:  Outpatient (Ambulatory)    Chief Complaint:  Shayna Melchor is a 45 y.o. female who presents today for follow-up of depression and anxiety.  Met with patient.     Current Medications:   Viibryd 10 mg BID  Clonazepam 1 mg qhs    Interval History and Content of Current Session:  Patient seen and chart reviewed. Last seen on 2/6/2025    Patient has a psychiatric history of: MDD, anxiety    Pt reports for follow up today stating she's been "good."    She continues to "adjust to new job", "far less work and far less stress."    She reports anxiety has been well managed, decreased since leaving last job.   She denies any recent panic/panic attacks.    Her mood has been "okay". She admits to feeling down, identifies WTF as being difficult for her due to isolation.   She continues to endorse some depression with low " "interest, but denies tearfulness, sadness, less feelings of dread.   She has identified depression fluctuates with hormonal cycles. She reports irritability, depressed mood, "rage depression" the week prior to what would be a cycle, (pt post hysterectomy), once reaches week of what would be period, "notable difference."     She reports knee pain is "much better" after injections from ortho.     She remains splitting the viibryd tablet in half, for 10 mg qam and 10 mg qhs, reports ASE when taking 20 mg whole.   She denies ASE with vybrid when taking 10 mg BID.    She is sleeping "okay." She continues to take clonazepam 1 mg qhs.     She reports current medications are going well, no med adjustments necessary    No SI/HI/self harm    Reviewed treatment for PMDD. Pt has taken prozac in past but denied significant benefit. She denied any ASE.     Psychotherapy:  Target symptoms: depression, anxiety   Why chosen therapy is appropriate versus another modality: relevant to diagnosis  Outcome monitoring methods: self-report, observation  Therapeutic intervention type: supportive psychotherapy  Topics discussed/themes: symptom recognition  The patient's response to the intervention is accepting. The patient's progress toward treatment goals is good.   Duration of intervention: 7 minutes.    Review of Systems   PSYCHIATRIC: Pertinant items are noted in the narrative.    Past Medication Trials:  trazodone, ambien, ativan, restoril, lunesta, belsomra, and remeron for sleep    Remeron- weight gain  Lexapro- increased anxiety  Zoloft- worked, then stopped  Wellbutirn- worsened anxiety  Cymbalta-   Pristiq-   Paxil- weight gain  Trazodone-   Ambien-   Lorazepam  Tenazepam  Lunesta  Belsomra  States "SNRI- "make me feel like I have the flu"      Past Medical, Family and Social History: The patient's past medical, family and social history have been reviewed and updated as appropriate within the electronic medical record - see " encounter notes.    Compliance: yes    Side effects: None    Risk Parameters:  Patient reports no suicidal ideation  Patient reports no homicidal ideation  Patient reports no self-injurious behavior  Patient reports no violent behavior    Exam (detailed: at least 9 elements; comprehensive: all 15 elements)   Constitutional  Vitals:  Most recent vital signs, dated less than 90 days prior to this appointment, were reviewed.   There were no vitals filed for this visit.     General:  unremarkable, age appropriate, casually dressed, neatly groomed     Musculoskeletal  Muscle Strength/Tone:  not examined   Gait & Station:  virtual     Psychiatric  Speech:  no latency; no press   Mood & Affect:  steady  congruent and appropriate   Thought Process:  normal and logical   Associations:  intact   Thought Content:  normal, no suicidality, no homicidality, delusions, or paranoia   Insight:  intact   Judgement: behavior is adequate to circumstances   Orientation:  grossly intact   Memory: intact for content of interview   Language: grossly intact   Attention Span & Concentration:  able to focus   Fund of Knowledge:  intact and appropriate to age and level of education     Assessment and Diagnosis   Status/Progress: Based on the examination today, the patient's problem(s) is/are improved and adequately but not ideally controlled.  New problems have been presented today.   Lack of compliance are not complicating management of the primary condition.  There are no active rule-out diagnoses for this patient at this time.     General Impression: Patient is a 45 year old female with a psychiatric history of recurrent, moderate MDD, JULIET  Patient reports benefit with current medications, viibryd 10 mg BID, and clonazepam 1 mg qhs. She reports less ASE with viibryd 10 mg BID vs 20 mg daily. Today  patient reports symptoms consistent with hormonal fluctuates, PMDD. Is amenable to trial prozac 10 mg the week prior to cycle.     Pt is stable,  doing well.       ICD-10-CM ICD-9-CM    1. Moderate episode of recurrent major depressive disorder  F33.1 296.32       2. Insomnia, unspecified type  G47.00 780.52 clonazePAM (KLONOPIN) 1 MG tablet      3. Generalized anxiety disorder  F41.1 300.02 clonazePAM (KLONOPIN) 1 MG tablet      4. Premenstrual dysphoria  F32.81 625.4                 Intervention/Counseling/Treatment Plan   Medication Management: Continue current medications. The risks and benefits of medication were discussed with the patient.  Start prozac 10 mg one week prior to menstrual cycle (when would be- post hysterectomy)  Continue viibryd 10 mg BID mg daily  Continue clonazepam 1 mg qhs  Informed pt of the risks of continuous Benzodiazepine use including tolerance, dependence and withdrawals that may be life threatening upon abrupt cessation. Also advised not to take Benzodiazepines with Opiates or other sedatives and also not to drive or operate heavy machinery while using Benzodiazepines.   Discussed diagnosis, risk and benefits of proposed treatment above vs alternative treatment vs no treatment, and potential side effects of these treatments, and the inherent unpredictability of individual responses to these treatments. The patient expresses understanding and gives informed consent to pursue treatment at this time, believing that the potential benefits outweigh the potential risks. Patient has no other questions. Risks/adverse effects at this time include but are not limited to: GI side effects, sexual dysfunction, activation vs sedation, triggering of suicidal ideation, and serotonin syndrome.   Patient voices understanding and agreement with this plan  Encouraged patient to keep future appointments  Instruct patient to call or message with questions  In the event of an emergency, including suicidal ideation, patient was advised to go to the emergency room      Return to Clinic: 3 months    Amber Barbosa PA-C

## 2025-05-01 ENCOUNTER — OFFICE VISIT (OUTPATIENT)
Dept: PSYCHIATRY | Facility: CLINIC | Age: 45
End: 2025-05-01
Payer: OTHER GOVERNMENT

## 2025-05-01 DIAGNOSIS — F32.81 PREMENSTRUAL DYSPHORIA: ICD-10-CM

## 2025-05-01 DIAGNOSIS — F41.1 GENERALIZED ANXIETY DISORDER: ICD-10-CM

## 2025-05-01 DIAGNOSIS — G47.00 INSOMNIA, UNSPECIFIED TYPE: ICD-10-CM

## 2025-05-01 DIAGNOSIS — F33.1 MODERATE EPISODE OF RECURRENT MAJOR DEPRESSIVE DISORDER: Primary | ICD-10-CM

## 2025-05-01 RX ORDER — CLONAZEPAM 1 MG/1
1 TABLET ORAL NIGHTLY
Qty: 30 TABLET | Refills: 3 | Status: SHIPPED | OUTPATIENT
Start: 2025-05-01

## 2025-05-01 RX ORDER — FLUOXETINE 10 MG/1
CAPSULE ORAL
Qty: 30 CAPSULE | Refills: 0 | Status: SHIPPED | OUTPATIENT
Start: 2025-05-01

## 2025-05-01 RX ORDER — VILAZODONE HYDROCHLORIDE 10 MG/1
10 TABLET ORAL 2 TIMES DAILY
Qty: 60 TABLET | Refills: 2 | Status: SHIPPED | OUTPATIENT
Start: 2025-05-01 | End: 2025-07-30

## 2025-05-07 NOTE — PROGRESS NOTES
"CC: Postop visit    Shayna Melchor is a 45 y.o. female  post-op from a TLH/BS on 10/23/24.  Patient is Doing well postoperatively.  Though remains with some pain.    The pathology revealed:    1. UTERUS AND CERVIX, HYSTERECTOMY:    UTERUS:  - Dyssynchronous endometrium with partial progesterone hormone effect.  - Medical device consistent with intrauterine device is present (for gross examination only).  - No hyperplasia, atypia, or malignancy.    CERVIX:  - Benign cervix with reactive changes.    - No high-grade dysplasia or malignancy.    2. FALLOPIAN TUBE, RIGHT, SALPINGECTOMY:  - Benign fallopian tube and fimbriated end.  - Negative for malignancy.       Past Medical History:   Diagnosis Date    ADHD (attention deficit hyperactivity disorder)     Depression     Hyperlipidemia     Hypertension     EHSAN (obstructive sleep apnea)     wears cpap    PTSD (post-traumatic stress disorder)      Past Surgical History:   Procedure Laterality Date     SECTION  2015, 2017    ENDOSCOPIC CARPAL TUNNEL RELEASE Right 2024    Procedure: RELEASE, CARPAL TUNNEL, ENDOSCOPIC - right;  Surgeon: Jamey Mccord MD;  Location: [facility] OR;  Service: Orthopedics;  Laterality: Right;    LAPAROSCOPIC SALPINGECTOMY Right 10/23/2024    Procedure: SALPINGECTOMY, LAPAROSCOPIC;  Surgeon: Rima Vann DO;  Location: [facility] OR;  Service: OB/GYN;  Laterality: Right;    LAPAROSCOPIC TOTAL HYSTERECTOMY N/A 10/23/2024    Procedure: HYSTERECTOMY, TOTAL, LAPAROSCOPIC;  Surgeon: Rima Vann DO;  Location: [facility] OR;  Service: OB/GYN;  Laterality: N/A;    SINUS SURGERY  2009    deviated septum    TUBAL LIGATION  2017       /86   Ht 5' 5" (1.651 m)   Wt 111 kg (244 lb 11.4 oz)   LMP 10/01/2024 (Approximate) Comment: speci cup given to patient for dos  BMI 40.72 kg/m²     ROS:  GENERAL: No fever, chills, fatigability or weight loss.  VULVAR: No pain, no lesions and no itching.  VAGINAL: No " relaxation, no itching, no discharge, no abnormal bleeding and no lesions.  ABDOMEN: No abdominal pain. Denies nausea. Denies vomiting. No diarrhea. No constipation  BREAST: Denies pain. No lumps. No discharge.  URINARY: No incontinence, no nocturia, no frequency and no dysuria.  CARDIOVASCULAR: No chest pain. No shortness of breath. No leg cramps.  NEUROLOGICAL: No headaches. No vision changes.    PE:   GEN: AAO x 3, NAD  PELVIC: Normal external female genitalia without lesions. Normal hair distribution. Adequate perineal body, normal urethral meatus. Vagina moist and without lesions or discharge. No significant cystocele or rectocele. Uterus and cervix surgically absent. Vaginal cuff intact and appears normal. Bimanual exam revealed no masses, tenderness or abnormality.        ICD-10-CM ICD-9-CM    1. Status post hysterectomy  Z90.710 V88.01 Ambulatory referral/consult to Physical/Occupational Therapy            No follow-ups on file.

## 2025-05-16 ENCOUNTER — OFFICE VISIT (OUTPATIENT)
Dept: INTERNAL MEDICINE | Facility: CLINIC | Age: 45
End: 2025-05-16
Payer: OTHER GOVERNMENT

## 2025-05-16 ENCOUNTER — OFFICE VISIT (OUTPATIENT)
Dept: ORTHOPEDICS | Facility: CLINIC | Age: 45
End: 2025-05-16
Payer: OTHER GOVERNMENT

## 2025-05-16 VITALS
HEIGHT: 65 IN | BODY MASS INDEX: 42.44 KG/M2 | SYSTOLIC BLOOD PRESSURE: 118 MMHG | DIASTOLIC BLOOD PRESSURE: 80 MMHG | OXYGEN SATURATION: 98 % | HEART RATE: 80 BPM | WEIGHT: 254.75 LBS

## 2025-05-16 VITALS — WEIGHT: 247.38 LBS | HEIGHT: 65 IN | BODY MASS INDEX: 41.22 KG/M2

## 2025-05-16 DIAGNOSIS — I10 HYPERTENSION, UNSPECIFIED TYPE: ICD-10-CM

## 2025-05-16 DIAGNOSIS — Z00.00 HEALTH CARE MAINTENANCE: Primary | ICD-10-CM

## 2025-05-16 DIAGNOSIS — M51.362 DEGENERATION OF INTERVERTEBRAL DISC OF LUMBAR REGION WITH DISCOGENIC BACK PAIN AND LOWER EXTREMITY PAIN: Primary | ICD-10-CM

## 2025-05-16 DIAGNOSIS — E78.2 MIXED HYPERLIPIDEMIA: ICD-10-CM

## 2025-05-16 PROCEDURE — 99999 PR PBB SHADOW E&M-EST. PATIENT-LVL IV: CPT | Mod: PBBFAC,,, | Performed by: REGISTERED NURSE

## 2025-05-16 PROCEDURE — 99396 PREV VISIT EST AGE 40-64: CPT | Mod: S$PBB,,, | Performed by: INTERNAL MEDICINE

## 2025-05-16 PROCEDURE — 99214 OFFICE O/P EST MOD 30 MIN: CPT | Mod: PBBFAC | Performed by: REGISTERED NURSE

## 2025-05-16 PROCEDURE — 99999 PR PBB SHADOW E&M-EST. PATIENT-LVL V: CPT | Mod: PBBFAC,,, | Performed by: INTERNAL MEDICINE

## 2025-05-16 PROCEDURE — 99215 OFFICE O/P EST HI 40 MIN: CPT | Mod: PBBFAC,27 | Performed by: INTERNAL MEDICINE

## 2025-05-16 RX ORDER — METHOCARBAMOL 500 MG/1
500-1500 TABLET, FILM COATED ORAL 4 TIMES DAILY
Qty: 90 TABLET | Refills: 3 | Status: SHIPPED | OUTPATIENT
Start: 2025-05-16

## 2025-05-16 RX ORDER — MELOXICAM 15 MG/1
15 TABLET ORAL DAILY
Qty: 30 TABLET | Refills: 1 | Status: SHIPPED | OUTPATIENT
Start: 2025-05-16

## 2025-05-16 NOTE — PROGRESS NOTES
"Subjective:       Patient ID: Shayna Melchor is a 45 y.o. female.    Chief Complaint: Annual Exam (Weight loss )    HPI  Shayna Melchor is a 45 y.o. female here for a yearly preventative healthcare visit.     HTN  Labetalol changed to carvedilol 12.5mg bid  Telmisartan 80mg daily    Check Holter/TST/TTE.      TLH/ BS on 10/23/24     Mild venous insufficiency. Symptoms improved w hctz, but holding for now. Compression stockings.     OA of knees- received euflexxa injections of bilateral knees    Fluoxetine 10mg daily one week prior to menstrual cycle for PMDD  Viibryd 10 mg BID  Clonazepam 1 mg qhs    She and  do low cholesterol diet. Walks twice a day.     Mild intermittent asthma  Had one attack when oak trees blooming.   Review of Systems   Constitutional:  Negative for activity change and unexpected weight change.   HENT:  Negative for hearing loss, rhinorrhea and trouble swallowing.    Eyes:  Negative for discharge and visual disturbance.   Respiratory:  Negative for chest tightness and wheezing.    Cardiovascular:  Negative for chest pain and palpitations.   Gastrointestinal:  Positive for diarrhea. Negative for blood in stool, constipation and vomiting.   Endocrine: Negative for polydipsia and polyuria.   Genitourinary:  Negative for difficulty urinating, dysuria, hematuria and menstrual problem.   Musculoskeletal:  Positive for arthralgias and joint swelling. Negative for neck pain.   Neurological:  Positive for headaches. Negative for weakness.   Psychiatric/Behavioral:  Positive for dysphoric mood. Negative for confusion.        Objective:   /80 (BP Location: Left arm, Patient Position: Sitting)   Pulse 80   Ht 5' 5" (1.651 m)   Wt 115.5 kg (254 lb 11.9 oz)   LMP 10/01/2024 (Approximate) Comment: speci cup given to patient for dos  SpO2 98%   BMI 42.39 kg/m²      Physical Exam  Constitutional:       General: She is not in acute distress.     Appearance: She is well-developed. " She is not diaphoretic.   HENT:      Head: Normocephalic and atraumatic.   Cardiovascular:      Rate and Rhythm: Normal rate and regular rhythm.   Pulmonary:      Effort: Pulmonary effort is normal. No respiratory distress.      Breath sounds: No wheezing or rales.   Skin:     General: Skin is warm and dry.   Neurological:      Mental Status: She is alert and oriented to person, place, and time.   Psychiatric:         Behavior: Behavior normal.         Assessment:       1. Health care maintenance    2. Hypertension, unspecified type    3. Mixed hyperlipidemia        Plan:       Health care maintenance  -     CBC Auto Differential; Future; Expected date: 05/16/2025  -     Hemoglobin A1C; Future; Expected date: 05/16/2025  -     Comprehensive Metabolic Panel; Future; Expected date: 05/16/2025  -     Lipid Panel; Future; Expected date: 05/16/2025  -     TSH; Future; Expected date: 05/16/2025  -     Vitamin D; Future; Expected date: 05/16/2025    Hypertension, unspecified type  carvedilol 12.5mg bid  Telmisartan 80mg daily    Mixed hyperlipidemia   low cholesterol diet.     The 10-year ASCVD risk score (Lynne DK, et al., 2019) is: 1.9%    Values used to calculate the score:      Age: 45 years      Sex: Female      Is Non- : No      Diabetic: No      Tobacco smoker: No      Systolic Blood Pressure: 118 mmHg      Is BP treated: Yes      HDL Cholesterol: 38 mg/dL      Total Cholesterol: 225 mg/dL          You are up to date for your primary preventive health care, and there are no reminders at this time.     [facility]

## 2025-05-23 ENCOUNTER — TELEPHONE (OUTPATIENT)
Dept: GASTROENTEROLOGY | Facility: CLINIC | Age: 45
End: 2025-05-23
Payer: OTHER GOVERNMENT

## 2025-05-23 ENCOUNTER — LAB VISIT (OUTPATIENT)
Dept: LAB | Facility: HOSPITAL | Age: 45
End: 2025-05-23
Attending: INTERNAL MEDICINE
Payer: OTHER GOVERNMENT

## 2025-05-23 DIAGNOSIS — Z00.00 HEALTH CARE MAINTENANCE: ICD-10-CM

## 2025-05-23 LAB
25(OH)D3+25(OH)D2 SERPL-MCNC: 42 NG/ML (ref 30–96)
ABSOLUTE EOSINOPHIL (OHS): 0.18 K/UL
ABSOLUTE MONOCYTE (OHS): 0.48 K/UL (ref 0.3–1)
ABSOLUTE NEUTROPHIL COUNT (OHS): 7.91 K/UL (ref 1.8–7.7)
ALBUMIN SERPL BCP-MCNC: 3.4 G/DL (ref 3.5–5.2)
ALP SERPL-CCNC: 88 UNIT/L (ref 40–150)
ALT SERPL W/O P-5'-P-CCNC: 27 UNIT/L (ref 10–44)
ANION GAP (OHS): 10 MMOL/L (ref 8–16)
AST SERPL-CCNC: 10 UNIT/L (ref 11–45)
BASOPHILS # BLD AUTO: 0.06 K/UL
BASOPHILS NFR BLD AUTO: 0.6 %
BILIRUB SERPL-MCNC: 0.5 MG/DL (ref 0.1–1)
BUN SERPL-MCNC: 10 MG/DL (ref 6–20)
CALCIUM SERPL-MCNC: 9.1 MG/DL (ref 8.7–10.5)
CHLORIDE SERPL-SCNC: 105 MMOL/L (ref 95–110)
CHOLEST SERPL-MCNC: 219 MG/DL (ref 120–199)
CHOLEST/HDLC SERPL: 6.1 {RATIO} (ref 2–5)
CO2 SERPL-SCNC: 24 MMOL/L (ref 23–29)
CREAT SERPL-MCNC: 0.9 MG/DL (ref 0.5–1.4)
EAG (OHS): 108 MG/DL (ref 68–131)
ERYTHROCYTE [DISTWIDTH] IN BLOOD BY AUTOMATED COUNT: 13.6 % (ref 11.5–14.5)
GFR SERPLBLD CREATININE-BSD FMLA CKD-EPI: >60 ML/MIN/1.73/M2
GLUCOSE SERPL-MCNC: 85 MG/DL (ref 70–110)
HBA1C MFR BLD: 5.4 % (ref 4–5.6)
HCT VFR BLD AUTO: 37.5 % (ref 37–48.5)
HDLC SERPL-MCNC: 36 MG/DL (ref 40–75)
HDLC SERPL: 16.4 % (ref 20–50)
HGB BLD-MCNC: 12.1 GM/DL (ref 12–16)
IMM GRANULOCYTES # BLD AUTO: 0.1 K/UL (ref 0–0.04)
IMM GRANULOCYTES NFR BLD AUTO: 0.9 % (ref 0–0.5)
LDLC SERPL CALC-MCNC: 152.6 MG/DL (ref 63–159)
LYMPHOCYTES # BLD AUTO: 1.9 K/UL (ref 1–4.8)
MCH RBC QN AUTO: 31.6 PG (ref 27–31)
MCHC RBC AUTO-ENTMCNC: 32.3 G/DL (ref 32–36)
MCV RBC AUTO: 98 FL (ref 82–98)
NONHDLC SERPL-MCNC: 183 MG/DL
NUCLEATED RBC (/100WBC) (OHS): 0 /100 WBC
PLATELET # BLD AUTO: 285 K/UL (ref 150–450)
PMV BLD AUTO: 9.9 FL (ref 9.2–12.9)
POTASSIUM SERPL-SCNC: 4.4 MMOL/L (ref 3.5–5.1)
PROT SERPL-MCNC: 7.2 GM/DL (ref 6–8.4)
RBC # BLD AUTO: 3.83 M/UL (ref 4–5.4)
RELATIVE EOSINOPHIL (OHS): 1.7 %
RELATIVE LYMPHOCYTE (OHS): 17.9 % (ref 18–48)
RELATIVE MONOCYTE (OHS): 4.5 % (ref 4–15)
RELATIVE NEUTROPHIL (OHS): 74.4 % (ref 38–73)
SODIUM SERPL-SCNC: 139 MMOL/L (ref 136–145)
TRIGL SERPL-MCNC: 152 MG/DL (ref 30–150)
TSH SERPL-ACNC: 1.2 UIU/ML (ref 0.4–4)
WBC # BLD AUTO: 10.63 K/UL (ref 3.9–12.7)

## 2025-05-23 PROCEDURE — 80061 LIPID PANEL: CPT

## 2025-05-23 PROCEDURE — 85025 COMPLETE CBC W/AUTO DIFF WBC: CPT

## 2025-05-23 PROCEDURE — 36415 COLL VENOUS BLD VENIPUNCTURE: CPT

## 2025-05-23 PROCEDURE — 83036 HEMOGLOBIN GLYCOSYLATED A1C: CPT

## 2025-05-23 PROCEDURE — 80053 COMPREHEN METABOLIC PANEL: CPT

## 2025-05-23 PROCEDURE — 84443 ASSAY THYROID STIM HORMONE: CPT

## 2025-05-23 PROCEDURE — 82306 VITAMIN D 25 HYDROXY: CPT

## 2025-05-23 NOTE — TELEPHONE ENCOUNTER
----- Message from Ranjan sent at 5/23/2025  2:21 PM CDT -----  Regarding: /call marva  Contact: [phone]  Type:  call Cristian Called: anneWould the patient rather a call back or a response via [facility]? [facility] Back Number: [phone] Additional Information:

## 2025-05-23 NOTE — TELEPHONE ENCOUNTER
----- Message from Ivy sent at 5/23/2025  2:05 PM CDT -----  Patient is requesting a call back to schedule an annual visit for check up and meds. Please call patient back at [phone]

## 2025-05-29 ENCOUNTER — PATIENT MESSAGE (OUTPATIENT)
Dept: GASTROENTEROLOGY | Facility: CLINIC | Age: 45
End: 2025-05-29
Payer: OTHER GOVERNMENT

## 2025-05-29 DIAGNOSIS — R10.9 ABDOMINAL CRAMPING: ICD-10-CM

## 2025-05-29 RX ORDER — HYOSCYAMINE SULFATE 0.12 MG/1
0.12 TABLET SUBLINGUAL EVERY 4 HOURS PRN
Qty: 120 TABLET | Refills: 1 | Status: SHIPPED | OUTPATIENT
Start: 2025-05-29 | End: 2025-08-27

## 2025-06-02 ENCOUNTER — PATIENT MESSAGE (OUTPATIENT)
Dept: SLEEP MEDICINE | Facility: CLINIC | Age: 45
End: 2025-06-02
Payer: OTHER GOVERNMENT

## 2025-06-04 ENCOUNTER — PATIENT MESSAGE (OUTPATIENT)
Dept: SLEEP MEDICINE | Facility: CLINIC | Age: 45
End: 2025-06-04

## 2025-06-04 ENCOUNTER — OFFICE VISIT (OUTPATIENT)
Dept: SLEEP MEDICINE | Facility: CLINIC | Age: 45
End: 2025-06-04
Payer: OTHER GOVERNMENT

## 2025-06-04 VITALS
WEIGHT: 249.13 LBS | DIASTOLIC BLOOD PRESSURE: 76 MMHG | BODY MASS INDEX: 41.51 KG/M2 | HEIGHT: 65 IN | HEART RATE: 70 BPM | SYSTOLIC BLOOD PRESSURE: 140 MMHG

## 2025-06-04 DIAGNOSIS — G47.33 OSA (OBSTRUCTIVE SLEEP APNEA): Primary | ICD-10-CM

## 2025-06-04 DIAGNOSIS — F51.09 OTHER INSOMNIA NOT DUE TO A SUBSTANCE OR KNOWN PHYSIOLOGICAL CONDITION: ICD-10-CM

## 2025-06-04 PROCEDURE — 99214 OFFICE O/P EST MOD 30 MIN: CPT | Mod: PBBFAC | Performed by: PHYSICIAN ASSISTANT

## 2025-06-04 PROCEDURE — 99214 OFFICE O/P EST MOD 30 MIN: CPT | Mod: S$PBB,,, | Performed by: PHYSICIAN ASSISTANT

## 2025-06-04 PROCEDURE — 99999 PR PBB SHADOW E&M-EST. PATIENT-LVL IV: CPT | Mod: PBBFAC,,, | Performed by: PHYSICIAN ASSISTANT

## 2025-06-18 ENCOUNTER — RESULTS FOLLOW-UP (OUTPATIENT)
Dept: INTERNAL MEDICINE | Facility: CLINIC | Age: 45
End: 2025-06-18

## 2025-06-24 ENCOUNTER — CLINICAL SUPPORT (OUTPATIENT)
Dept: REHABILITATION | Facility: OTHER | Age: 45
End: 2025-06-24
Payer: OTHER GOVERNMENT

## 2025-06-24 DIAGNOSIS — R68.89 DECREASED FUNCTIONAL ACTIVITY TOLERANCE: ICD-10-CM

## 2025-06-24 DIAGNOSIS — M51.362 DEGENERATION OF INTERVERTEBRAL DISC OF LUMBAR REGION WITH DISCOGENIC BACK PAIN AND LOWER EXTREMITY PAIN: ICD-10-CM

## 2025-06-24 DIAGNOSIS — R29.898 DECREASED STRENGTH OF TRUNK AND BACK: Primary | ICD-10-CM

## 2025-06-24 PROCEDURE — 97110 THERAPEUTIC EXERCISES: CPT

## 2025-06-24 PROCEDURE — 97161 PT EVAL LOW COMPLEX 20 MIN: CPT

## 2025-06-24 NOTE — TELEPHONE ENCOUNTER
Spoke with the patient. Notified of 6:00 AM arrival time to the [facility], [facility].  Informed of current visitor policy.  Reminded of NPO. Patient verbalized understanding to all.    Mary Asher   Medical Assistant to Dr. Ross Dunbar [facility]  
no...

## 2025-06-24 NOTE — PROGRESS NOTES
Outpatient Rehab    Physical Therapy Evaluation    Patient Name: Olga Melchor  MRN: [id number]  YOB: 1980  Encounter Date: 6/24/2025    Therapy Diagnosis:   Encounter Diagnoses   Name Primary?    Degeneration of intervertebral disc of lumbar region with discogenic back pain and lower extremity pain     Decreased strength of trunk and back Yes    Decreased functional activity tolerance      Physician: ABNER Horvath NP    Physician Orders: Eval and Treat  Medical Diagnosis: Degeneration of intervertebral disc of lumbar region with discogenic back pain and lower extremity pain  Surgical Diagnosis: Not applicable for this Episode   Surgical Date: Not applicable for this Episode  Days Since Last Surgery: Not applicable for this Episode    Visit # / Visits Authorized:  1 / 1  Insurance Authorization Period: 5/16/2025 to 12/31/2025  Date of Evaluation: 6/24/2025  Plan of Care Certification: 6/24/2025 to 9/16/2025      INSURANCE and OUTCOMES: Fee for Service with FOTO Outcomes 1/3    Precautions: standard, OA    Pattern of pain determined: 1 PEP    Time In: 1310   Time Out: 1400  Total Time (in minutes): 50   Total Billable Time (in minutes): 50    Intake Outcome Measure for FOTO Survey    Therapist reviewed FOTO scores for Olga Melchor on 6/24/2025.   FOTO report - see Media section or FOTO account episode details.     Intake Score: 53%    Precautions:       Subjective         She reports constant low back pain and upper back pain. She reports arthritis. The pain started in her 20s, she was here blast year and had to due to BP. Sits in a chair most of the day for work. She feels like she has a limited ROM for twisting. Couple months ago she tweaked something in the back after picking up a ivon and that spot in the back gets aggravated when twisting. Difficulty with lifting laundry and taking ut up the stairs. It hurts when leaning back. Wears a lift in the shoe on the L side and it helps with radiating  pain. Pain would radiate to the mid thigh and crotch areas. Good day would be the ability to walk 8-9 block, on a bad day 4 blocks, the pain is immediate but some days she is able to power through it. The upper back is worse because that is where the arthritis is but also depends on the activity she is doing.      PER MD:    M51.362 (ICD-10-CM) - Degeneration of intervertebral disc of lumbar region with discogenic back pain and lower extremity pain     HISTORY:  Shayna Melchor is a 45 y.o. female here for initial evaluation of low back and intermittent left posterior leg pain (Back - 2, Leg - 0).  The pain in the left low back is what bothers her most.  The pain has been present for years. The patient describes the pain as dull and aching.  The pain is worse with sitting and improved by medications. There is associated numbness and tingling. There is no subjective weakness. Prior treatments have included the healthy back program, mobic, robaxin, and advil, but no RUBEN or surgery.    Past Medical History/Physical Systems Review:   Shayna Melchor  has a past medical history of ADHD (attention deficit hyperactivity disorder), Depression, Hyperlipidemia, Hypertension, EHSAN (obstructive sleep apnea), and PTSD (post-traumatic stress disorder).    Shayna Melchor  has a past surgical history that includes  section (2015, 2017); Tubal ligation (2017); Sinus surgery (); Laparoscopic total hysterectomy (N/A, 10/23/2024); Laparoscopic salpingectomy (Right, 10/23/2024); and Endoscopic carpal tunnel release (Right, 2024).    Shayna Rutherford has a current medication list which includes the following prescription(s): albuterol, azelastine, carvedilol, clonazepam, epinephrine, esomeprazole, famotidine, fluoxetine, hyoscyamine, meloxicam, methocarbamol, montelukast, omega-3/dha/epa/algal oil, ondansetron, telmisartan, and vilazodone.    Review of patient's allergies indicates:    Allergen Reactions    Benadryl allergy-sinus Palpitations     tachycardia    Ciprofloxacin Hives, Shortness Of Breath and Other (See Comments)     hives    Diphenhydramine Shortness Of Breath, Palpitations and Other (See Comments)     Other Reaction(s): Other        Fish containing products Other (See Comments), Anaphylaxis, Hives and Shortness Of Breath     White fish    Any type of fish.    Latex, natural rubber Hives    Nickel Hives and Rash      Imaging: X-ray 4/2025  EXAMINATION:  XR LUMBAR SPINE AP AND LAT WITH FLEX/EXT     CLINICAL HISTORY:  Other intervertebral disc degeneration, lumbar region without mention of lumbar back pain or lower extremity pain     TECHNIQUE:  AP and lateral views as well as lateral flexion and extension images are performed through the lumbar spine.     FINDINGS:  There is partial visualization of a dextroscoliosis of the thoracolumbar spine.  There is grade 1 retrolisthesis of L1 in relation to L2.  There is loss of disc space height with degenerative endplate change at T12-L1 and L1-L2 as well as L5-S1.  There is no instability upon flexion extension.  There is no fracture, dislocation, or bony erosion.     Impression:     As above.    Prior Therapy: HB last year  Prior Treatment: Massage is helpful  Social History:  lives with their family  Occupation: , sedentary  Leisure: Bake, read      Prior Level of Function: I with ADL  Current Level of Function: Has to take breaks with laundry, cooking  DME owned/used: bands, strap, row machine, yoga, physico ball  Gym Membership: yes    Pain:  Current 3/10, worst 8/10, best 0/10   Location: central L side will radiate pain down   Description: tender and aching, sometimes sharp depending on the movements  Aggravating Factors: sitting, leaning back, rotation, sitting in car  Easing Factors: ice pack, Muscle relaxer 2x/day, miloxicam for OA  Disturbed Sleep: yes, has to take muscle relaxer's at night    Pattern of pain  questions:  1.  Where is your pain the worst? back  2.  Is your pain constant or intermittent? intermittent  3.  Does bending forward make your typical pain worse? yes  4.  Since the start of your back pain, has there been a change in your bowel or bladder? no  5.  What can't you do now that you use to be able to do? Sitting, standing, walking, bending    Pts goals: Be able to walk without increased low back pain    Red Flag Screening:   Cough/Sneeze Strain: (--)  Bladder/bowel: (--)  Falls: (--)  Night pain: (--)  Unexplained weight loss: (--)  General health: fair    Objective          Postural examination/scapula alignment: Rounded shoulder and Decreased lordosis R shoulder and pelvis elevated  Joint integrity: Firm end feeling, increased pain with palpation at upper lumbar lower thoracic region  Skin integrity:WNL   Edema: Moderate  Correction of posture: better with lumbar roll  Sitting: fair  Standing: fair    GAIT:  Assistive Device used: none  Level of Assistance: independent  Patient displays the following gait deviations: unsteady gait.    MOVEMENT LOSS - Lumbar   Norms ROM Loss Initial   Flexion Fingers touch toes, sacral angle >/= 70 deg, uniform spinal curvature, posterior weight shift  minimal loss P LE   Extension ASIS surpasses toes, spine of scapulae surpasses heels, uniform spinal curve within functional limits   Side glide Right  minimal loss   Side glide Left  minimal loss   Rotation Right PT observes contralateral shoulder major loss   Rotation Left PT observes contralateral shoulder major loss P!     Lower Extremity Strength  Right LE  Left LE    Hip flexion: 4/5 Hip flexion: 3+/5   Hip extension:  4/5 Hip extension: 3+/5   Hip abduction: 5/5 Hip abduction: 5/5   Hip adduction:  5/5 Hip adduction:  5/5   Knee Flexion 5/5 Knee Flexion 5/5   Knee Extension 5/5 Knee Extension 5/5   Ankle dorsiflexion: 5/5 Ankle dorsiflexion: 5/5   Ankle plantarflexion: 5/5 Ankle plantarflexion: 5/5       Special  Tests:   Test Name  Test Result   Prone Instability Test (+)   SI Joint Provocation Test (--)   Thigh thrust (--)   Sacral Thrust (--)   Straight Leg Raise (--)   Neural Tension Test (+)   Crossed Straight Leg Raise (--)   DORETHA (--)     R hip IR  L hip IR, L pirifomris length    NEUROLOGICAL SCREENING:     Sensory deficits:  Intact to light touch    Reflexes:    Left Right   Patella Tendon 2+ 2+   Clonus (--) (--)     REPEATED TEST MOVEMENTS:    Baseline symptoms:  Repeated Flexion in Standing worse in T spine   Repeated Extension in Standing worse in T spine   Repeated Flexion in lying better   Repeated Extension in lying  no effect       STATIC TESTS and other movements:   Prone lie no effect   Prone lie on elbows no effect   Sitting slouched  worse   Sitting erect no effect       Lumbar testing Visit 2  Treatment:  Therapeutic Exercise  TE 1: Warm up - NV  TE 2: Prone press up x10  TE 3: Extension in standing x10  TE 4: Thoracic extension x10      Therapeutic Education/Activity provided for 5 minutes:   - Patient was given an [facility] Healthy Back Visit 1 handout which discusses the following:  - what to expect in therapy  - an overview of the program, including health coaching and wellness  - importance of spinal hygiene, proper posture, lifting mechanics, sleep quality, and nutrition/hydration   - Patient received a handout regarding anticipated muscular soreness following the isometric test and strategies for management were reviewed with patient including stretching, using ice and scheduled rest.   - Patient received verbal education on the following:   - Healthy Back program   - purpose of the isometric test  - safe progression of lumbar strengthening, wellness approach, and systemic strengthening.   - safe usage of MedX machine and testing protocols.        Time Entry(in minutes):  PT Evaluation (Low) Time Entry: 40  Therapeutic Activity Time Entry: 5  Therapeutic Exercise Time Entry: 5    Assessment & Plan    Assessment  Olga presents with a condition of Low complexity.   Presentation of Symptoms: Stable  Will Comorbidities Impact Care: Yes  Arthritis    Functional Limitations: Activity tolerance, Completing work/school activities, Transfers, Range of motion, Performing household chores, Decreased ambulation distance/endurance, Disrupted sleep pattern, Standing tolerance, Sitting tolerance, Squatting, Driving  Impairments: Activity intolerance  Personal Factors Affecting Prognosis: Pain    Prognosis: Excellent  Assessment Details: Olga presents to therapy with complaints of thoracic and lumbar level pain with L side sx that causes difficulty with static sitting, standing for cooking, cleaning, baking, work related activities, walking, bending, and driving. The evaluation shows decrease in cardinal plane mobility with pain, inconclusive neurological findings, however has positive reports of radicular sx, and decreased hip strength findings. Pt appears to have extension movement preference and would benefit from exercises to improve pain free movement and functional activity. All of the above noted supports potential  classification as a pattern 1 PEP with recurrent/ or consistent symptoms, thus pt is a good candidate for the Healthy Back Program. Pt would benefit from comprehensive UE and LE and trunk mobility training, stability training,  improved cardiovascular and muscular endurance, neuromuscular re-education for posture, coordination, and muscular recruitment and education on positional offloading techniques to decrease the intensity and frequency of flare-ups. She was not able to complete Lumbar medx testing and will complete next visit. Olga has been given initial HEP and will also benefit from strength and stability exercises as they progress through the program.    Plan  From a physical therapy perspective, the patient would benefit from: Skilled Rehab Services    Planned therapy interventions include:  Therapeutic exercise, Therapeutic activities, Neuromuscular re-education, Manual therapy, ADLs/IADLs, and Sensory integration.    Planned modalities to include: Electrical stimulation - attended, Cryotherapy (cold pack), Electrical stimulation - passive/unattended, Thermotherapy (hot pack), and Other (Comment). Dry Needle      Visit Frequency: 2 times Per Week for 10 Weeks.       This plan was discussed with Patient.   Discussion participants: Agreed Upon Plan of Care  Plan details: Healthy Back protocol 2/week for isolated medx strengthening, whole body strengthening, therapy, manual skills and modalities as needed          The patient's spiritual, cultural, and educational needs were considered, and the patient is agreeable to the plan of care and goals.           Goals:   Active       Long term goals: 10 weeks or 20 visits        Pt will demonstrate increased lumbar ROM by at least 6 degrees from initial ROM value, resulting in improved ability to perform functional forward bending while standing and sitting.       Start:  06/24/25    Expected End:  09/16/25            Pt will demonstrate increased MedX average isometric strength value by 10% from MIDPOINT TEST resulting in improved ability to perform bending, lifting, and carrying activities safely and confidently.       Start:  06/24/25    Expected End:  09/16/25            Pt to demonstrate ability to independently control and reduce their pain through posture positioning and mechanical movements throughout a typical day.       Start:  06/24/25    Expected End:  09/16/25            Pt will demonstrate reduced pain and improved functional outcomes as reported on the FOTO by reaching an intake score of >/= 59% functional ability in order to demonstrate subjective improvement in patient's condition.        Start:  06/24/25    Expected End:  09/16/25            Pt will be able to tolerate standing for baking without increased low back pain       Start:  06/24/25     Expected End:  09/16/25            Pt will be able to walk 1 mile without increase in low back pain.       Start:  06/24/25    Expected End:  09/16/25            Pt will demonstrate independence with the HEP at discharge.       Start:  06/24/25    Expected End:  09/16/25               Short term goals:  6 weeks or 10 visits       Pt will demonstrate increased lumbar ROM by at least 3 degrees from the initial ROM value with improvements noted in functional ROM and ability to perform ADLs.       Start:  06/24/25    Expected End:  08/05/25            Pt will demonstrate increased MedX average isometric strength value by 15% from initial test resulting in improved ability to perform bending, lifting, and carrying activities safely, confidently.       Start:  06/24/25    Expected End:  08/05/25            Pt will report a reduction in worst pain score by 1-2 points for improved tolerance for static sitting.       Start:  06/24/25    Expected End:  08/05/25            Pt able to perform HEP correctly with minimal cueing or supervision from therapist to encourage independent management of symptoms.       Start:  06/24/25    Expected End:  08/05/25                Maria Isabel Galloway, PT

## 2025-06-30 ENCOUNTER — CLINICAL SUPPORT (OUTPATIENT)
Dept: REHABILITATION | Facility: OTHER | Age: 45
End: 2025-06-30
Payer: OTHER GOVERNMENT

## 2025-06-30 DIAGNOSIS — R68.89 DECREASED FUNCTIONAL ACTIVITY TOLERANCE: ICD-10-CM

## 2025-06-30 DIAGNOSIS — R29.898 DECREASED STRENGTH OF TRUNK AND BACK: Primary | ICD-10-CM

## 2025-06-30 PROCEDURE — 97112 NEUROMUSCULAR REEDUCATION: CPT

## 2025-06-30 PROCEDURE — 97110 THERAPEUTIC EXERCISES: CPT

## 2025-06-30 NOTE — PROGRESS NOTES
Outpatient Rehab    Physical Therapy Visit    Patient Name: Olag Melchor  MRN: [id number]  YOB: 1980  Encounter Date: 6/30/2025    Therapy Diagnosis:   Encounter Diagnoses   Name Primary?    Decreased strength of trunk and back Yes    Decreased functional activity tolerance      Physician: ABNER Horvath NP    Physician Orders: Eval and Treat  Medical Diagnosis: Degeneration of intervertebral disc of lumbar region with discogenic back pain and lower extremity pain  Surgical Diagnosis: Not applicable for this Episode   Surgical Date: Not applicable for this Episode  Days Since Last Surgery: Not applicable for this Episode    Visit # / Visits Authorized:  1 / 20 + Eval  Insurance Authorization Period: 6/24/2025 to 12/31/2025  Date of Evaluation: 6/24/2025  Plan of Care Certification: 6/25/2025 to 9/16/2025      PT/PTA: PT   Number of PTA visits since last PT visit:0  Time In: 0800   Time Out: 0845  Total Time (in minutes): 45   Total Billable Time (in minutes): 40    FOTO:  Intake Score:  %  Survey Score 2:  %  Survey Score 3:  %    Precautions:       Subjective   The worst few days after the initial eval and she took the muscle relaxer and was able to power through the exercises. This morning her pain is at baseline..  Pain reported as 2/10.      Objective              Isometric Testing on Med X equipment: Testing administered by PT    Test Initial Midpoint Final   Spine area:Lumbar      Date 6/30/2025     ROM 0-45 deg     Max Peak Torque 159      Min Peak Torque 38      Flex/Ext Ratio 4.18:1     % below normative data 27%     % gain from initial test Not available visit 1          Treatment:  Therapeutic Exercise  TE 1: NuStep 5'  TE 2: Prone on elbows 1' -- Prone press up x10  TE 4: Prone hip extension x10  TE 5: LTR x10  TE 10: Updoming visits: TrA isometric, PPT, bridges, open book stretch  Balance/Neuromuscular Re-Education  NMR 1: Lumbar Medx Testing    Therapeutic Exercise  Peripheral muscle  strengthening which included one set of 15-20 repetitions at a slow and controlled 10-13 second per rep pace focused on strengthening supporting musculature in order to improve body mechanics and functional mobility. Patient and therapist focused on proper form during treatment to ensure optimal strengthening of each targeted muscle group.  Machines utilized included:Torso rotation, Leg Ext, Leg Curl, Chest Press, and RowingTriceps, Biceps, Hip Abd, and Leg Press     Balance/Neuromuscular Re-Education    MedX testing: Patient  received neuromuscular education to engage spinal musculature correctly for motor control and engagement of musculature for 15 minutes including the MedX exercise component and practice and standard testing. MedX dynamic exercise and baseline isometric test performed with instructions to guide the patient safely through the testing procedure. Patient instructed to perform isometric test correctly and safely while building to an optimal force with a pain-free effort. Patient also instructed that they should feel support/pressure from MedX restraints but no pain/discomfort, and encouraged to report any pain to therapist. Patient demonstrated appropriate understanding of information and tolerance of test.  Education regarding purpose of test, safety during test given, and reviewed possible more soreness and strategies.             6/30/2025     8:21 AM   [facility]   Visit Number 2   VAS Pain Rating 2   Time 5   Extension in Lying 10   Extension in Standing 10   Lumbar Extension Seat Pad 1   Femur Restraint 5   Top Dead Center 24   Counterweight 295   Lumbar Flexion 45   Lumbar Extension 0   Lumbar Peak Torque 159 ft. lbs.   Min Torque 38   Test Percent Below Normative Data 27 %         Time Entry(in minutes):  Hot/Cold Pack Time Entry: 5  Neuromuscular Re-Education Time Entry: 15  Therapeutic Exercise Time Entry: 25    Assessment & Plan   Assessment: Olga presents to [facility]  back visit reporting minimal complaints of low back pain, citing typical morning soreness. Continued with HEP stretches and pt was able to complete with min cues. Added LTR exercises to improve mobility in the lumbar spine. Pt completed lumbar MedX testing and is 27% below averages. Pt was also able to complete half of the peripheral strengthening exercises without increased discomfort and will complete the complete circuit next visit as tolerated.   Evaluation/Treatment Tolerance: Patient tolerated treatment well    The patient will continue to benefit from skilled outpatient physical therapy in order to address the deficits listed in the problem list on the initial evaluation, provide patient and family education, and maximize the patients level of independence in the home and community environments.     The patient's spiritual, cultural, and educational needs were considered, and the patient is agreeable to the plan of care and goals.           Plan: Continue with established Plan of Care towards established PT goals.    Goals:   Active       Long term goals: 10 weeks or 20 visits        Pt will demonstrate increased lumbar ROM by at least 6 degrees from initial ROM value, resulting in improved ability to perform functional forward bending while standing and sitting. (Ongoing)       Start:  06/24/25    Expected End:  09/16/25            Pt will demonstrate increased MedX average isometric strength value by 10% from MIDPOINT TEST resulting in improved ability to perform bending, lifting, and carrying activities safely and confidently. (Ongoing)       Start:  06/24/25    Expected End:  09/16/25            Pt to demonstrate ability to independently control and reduce their pain through posture positioning and mechanical movements throughout a typical day. (Ongoing)       Start:  06/24/25    Expected End:  09/16/25            Pt will demonstrate reduced pain and improved functional outcomes as reported on the FOTO  by reaching an intake score of >/= 59% functional ability in order to demonstrate subjective improvement in patient's condition.  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 (Ongoing)       Start:  06/24/25    Expected End:  09/16/25            Pt will demonstrate independence with the HEP at discharge. (Ongoing)       Start:  06/24/25    Expected End:  09/16/25               Short term goals:  6 weeks or 10 visits       Pt will demonstrate increased lumbar ROM by at least 3 degrees from the initial ROM value with improvements noted in functional ROM and ability to perform ADLs. (Ongoing)       Start:  06/24/25    Expected End:  08/05/25            Pt will demonstrate increased MedX average isometric strength value by 15% from initial test resulting in improved ability to perform bending, lifting, and carrying activities safely, confidently. (Ongoing)       Start:  06/24/25    Expected End:  08/05/25            Pt will report a reduction in worst pain score by 1-2 points for improved tolerance for static sitting. (Ongoing)       Start:  06/24/25    Expected End:  08/05/25            Pt able to perform HEP correctly with minimal cueing or supervision from therapist to encourage independent management of symptoms. (Ongoing)       Start:  06/24/25    Expected End:  08/05/25                Maria Isabel Galloway, PT

## 2025-07-01 RX ORDER — FLUOXETINE 10 MG/1
CAPSULE ORAL
Qty: 30 CAPSULE | Refills: 0 | Status: SHIPPED | OUTPATIENT
Start: 2025-07-01

## 2025-07-03 ENCOUNTER — CLINICAL SUPPORT (OUTPATIENT)
Dept: REHABILITATION | Facility: OTHER | Age: 45
End: 2025-07-03
Payer: OTHER GOVERNMENT

## 2025-07-03 DIAGNOSIS — R29.898 DECREASED STRENGTH OF TRUNK AND BACK: Primary | ICD-10-CM

## 2025-07-03 DIAGNOSIS — R68.89 DECREASED FUNCTIONAL ACTIVITY TOLERANCE: ICD-10-CM

## 2025-07-03 PROCEDURE — 97110 THERAPEUTIC EXERCISES: CPT

## 2025-07-03 PROCEDURE — 97112 NEUROMUSCULAR REEDUCATION: CPT

## 2025-07-03 NOTE — PROGRESS NOTES
"    Outpatient Rehab    Physical Therapy Visit    Patient Name: Olga Melchor  MRN: [id number]  YOB: 1980  Encounter Date: 7/3/2025    Therapy Diagnosis:   Encounter Diagnoses   Name Primary?    Decreased strength of trunk and back Yes    Decreased functional activity tolerance      Physician: ABNER Horvath NP    Physician Orders: Eval and Treat  Medical Diagnosis: Degeneration of intervertebral disc of lumbar region with discogenic back pain and lower extremity pain  Surgical Diagnosis: Not applicable for this Episode   Surgical Date: Not applicable for this Episode  Days Since Last Surgery: Not applicable for this Episode    Visit # / Visits Authorized:  2 / 20 + Eval  Insurance Authorization Period: 6/24/2025 to 12/31/2025  Date of Evaluation: 6/24/2025  Plan of Care Certification: 6/25/2025 to 9/16/2025      PT/PTA: PT   Number of PTA visits since last PT visit:0  Time In: 0800   Time Out: 0900  Total Time (in minutes): 60   Total Billable Time (in minutes): 55    FOTO:  Intake Score:  %  Survey Score 2:  %  Survey Score 3:  %    Precautions:       Subjective   Patient reports minimal LBP. Her pain is mostly worse in the mornings and gets better in the evenings. She felt fine after IE and reports HEP is going fine..  Pain reported as 3/10.      Objective              Isometric Testing on Med X equipment: Testing administered by PT    Test Initial Midpoint Final   Spine area:Lumbar      Date 6/30/2025     ROM 0-45 deg     Max Peak Torque 159      Min Peak Torque 38      Flex/Ext Ratio 4.18:1     % below normative data 27%     % gain from initial test Not available visit 1          Treatment:  Therapeutic Exercise  TE 1: Recumbent bike, 5'  TE 2: Prone on elbows 1' -- Prone press up x10  TE 3: Extension in standing x10  TE 4: Prone hip extension x10  TE 5: LTR x10  TE 6: seated thoracic extensions, x10x5"  TE 7: +PPT, x10x5"  TE 8: +bridges, x10x5"  TE 10: Updoming visits: TrA isometric, open book " stretch  Balance/Neuromuscular Re-Education  NMR 1: Lumbar Medx Testing  Modalities  Cryotherapy (Minutes\Location): 5/low back    Therapeutic Exercise  Peripheral muscle strengthening which included one set of 15-20 repetitions at a slow and controlled 10-13 second per rep pace focused on strengthening supporting musculature in order to improve body mechanics and functional mobility. Patient and therapist focused on proper form during treatment to ensure optimal strengthening of each targeted muscle group.  Machines utilized included:Torso rotation, Leg Ext, Leg Curl, Chest Press, and RowingTriceps, Biceps, Hip Abd, and Leg Press     Balance/Neuromuscular Re-Education    MedX testing: Patient  received neuromuscular education to engage spinal musculature correctly for motor control and engagement of musculature for 15 minutes including the MedX exercise component and practice and standard testing. MedX dynamic exercise and baseline isometric test performed with instructions to guide the patient safely through the testing procedure. Patient instructed to perform isometric test correctly and safely while building to an optimal force with a pain-free effort. Patient also instructed that they should feel support/pressure from MedX restraints but no pain/discomfort, and encouraged to report any pain to therapist. Patient demonstrated appropriate understanding of information and tolerance of test.  Education regarding purpose of test, safety during test given, and reviewed possible more soreness and strategies.             7/3/2025     8:14 AM   [facility]   Visit Number 3   VAS Pain Rating 0   Time 5   Extension in Lying 10   Extension in Standing 10   Flexion in Lying 10   Lumbar Weight 80 lbs   Repetitions 15   Rating of Perceived Exertion 6   Ice - Z Lie (in min.) 5   *DECREASE RESISTANCE NV TO 70 FT/LBS*         Time Entry(in minutes):  Hot/Cold Pack Time Entry: 5  Neuromuscular Re-Education Time Entry:  10  Therapeutic Exercise Time Entry: 45    Assessment & Plan   Assessment: Patient presents to therapy with minimal LBP. She performed exercises well without exhibiting exacerbation of symptoms. Added PPT and bridges to treatment log with good patient feedback. Lumbar MedX resistance performed at half of max torque which is 80 ft/lbs with completion of 15 reps at 6/10 RPE. Plan to decrease resistance to 70 ft/lbs NV for better patient tolerance. Half of peripheral circuit performed without difficulty. Remaining peripheral circuit to be performed NV. Will progress HB protocol as tolerated by patient.   Evaluation/Treatment Tolerance: Patient tolerated treatment well    The patient will continue to benefit from skilled outpatient physical therapy in order to address the deficits listed in the problem list on the initial evaluation, provide patient and family education, and maximize the patients level of independence in the home and community environments.     The patient's spiritual, cultural, and educational needs were considered, and the patient is agreeable to the plan of care and goals.           Plan: Continue with established Plan of Care towards established PT goals.    Goals:   Active       Long term goals: 10 weeks or 20 visits        Pt will demonstrate increased lumbar ROM by at least 6 degrees from initial ROM value, resulting in improved ability to perform functional forward bending while standing and sitting. (Ongoing)       Start:  06/24/25    Expected End:  09/16/25            Pt will demonstrate increased MedX average isometric strength value by 10% from MIDPOINT TEST resulting in improved ability to perform bending, lifting, and carrying activities safely and confidently. (Ongoing)       Start:  06/24/25    Expected End:  09/16/25            Pt to demonstrate ability to independently control and reduce their pain through posture positioning and mechanical movements throughout a typical day.  (Ongoing)       Start:  06/24/25    Expected End:  09/16/25            Pt will demonstrate reduced pain and improved functional outcomes as reported on the FOTO by reaching an intake score of >/= 59% functional ability in order to demonstrate subjective improvement in patient's condition.  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 (Ongoing)       Start:  06/24/25    Expected End:  09/16/25            Pt will demonstrate independence with the HEP at discharge. (Ongoing)       Start:  06/24/25    Expected End:  09/16/25               Short term goals:  6 weeks or 10 visits       Pt will demonstrate increased lumbar ROM by at least 3 degrees from the initial ROM value with improvements noted in functional ROM and ability to perform ADLs. (Ongoing)       Start:  06/24/25    Expected End:  08/05/25            Pt will demonstrate increased MedX average isometric strength value by 15% from initial test resulting in improved ability to perform bending, lifting, and carrying activities safely, confidently. (Ongoing)       Start:  06/24/25    Expected End:  08/05/25            Pt will report a reduction in worst pain score by 1-2 points for improved tolerance for static sitting. (Ongoing)       Start:  06/24/25    Expected End:  08/05/25            Pt able to perform HEP correctly with minimal cueing or supervision from therapist to encourage independent management of symptoms. (Ongoing)       Start:  06/24/25    Expected End:  08/05/25                Amalia Mackenzie, PT

## 2025-07-08 ENCOUNTER — CLINICAL SUPPORT (OUTPATIENT)
Dept: REHABILITATION | Facility: OTHER | Age: 45
End: 2025-07-08
Payer: OTHER GOVERNMENT

## 2025-07-08 DIAGNOSIS — R29.898 DECREASED STRENGTH OF TRUNK AND BACK: Primary | ICD-10-CM

## 2025-07-08 DIAGNOSIS — R68.89 DECREASED FUNCTIONAL ACTIVITY TOLERANCE: ICD-10-CM

## 2025-07-08 PROCEDURE — 97110 THERAPEUTIC EXERCISES: CPT

## 2025-07-08 NOTE — PROGRESS NOTES
"    Outpatient Rehab    Physical Therapy Visit    Patient Name: Olga Melchor  MRN: [id number]  YOB: 1980  Encounter Date: 7/8/2025    Therapy Diagnosis:   Encounter Diagnoses   Name Primary?    Decreased strength of trunk and back Yes    Decreased functional activity tolerance      Physician: ABNER Horvath NP    Physician Orders: Eval and Treat  Medical Diagnosis: Degeneration of intervertebral disc of lumbar region with discogenic back pain and lower extremity pain  Surgical Diagnosis: Not applicable for this Episode   Surgical Date: Not applicable for this Episode  Days Since Last Surgery: Not applicable for this Episode    Visit # / Visits Authorized:  3 / 20 + Eval  Insurance Authorization Period: 6/24/2025 to 12/31/2025  Date of Evaluation: 6/24/2025  Plan of Care Certification: 6/25/2025 to 9/16/2025      PT/PTA: PT   Number of PTA visits since last PT visit:0  Time In: 0730   Time Out: 0825  Total Time (in minutes): 55   Total Billable Time (in minutes): 50    FOTO:  Intake Score:  %  Survey Score 2:  %  Survey Score 3:  %    Precautions:       Subjective   Olga reports pain level at this time is about 2/10 at this time..  Pain reported as 2/10.      Objective              Isometric Testing on Med X equipment: Testing administered by PT    Test Initial Midpoint Final   Spine area:Lumbar      Date 6/30/2025     ROM 0-45 deg     Max Peak Torque 159      Min Peak Torque 38      Flex/Ext Ratio 4.18:1     % below normative data 27%     % gain from initial test Not available visit 1          Treatment:  Therapeutic Exercise  TE 1: NuStep 5'  TE 2: Prone on elbows 1' -- Prone press up x10  TE 4: Prone hip extension x10  TE 5: LTR x10  TE 6: seated thoracic extensions, x10x5"  TE 7: PPT, x10x5"  TE 8: Bridges, x10x5"  TE 9: Cat/Camel x10 -- open book stretch x10  TE 10: Updoming visits: TrA isometric, pallof press, PPT + march, PPT + 90/90      Therapeutic Exercise  Peripheral muscle strengthening " which included one set of 15-20 repetitions at a slow and controlled 10-13 second per rep pace focused on strengthening supporting musculature in order to improve body mechanics and functional mobility. Patient and therapist focused on proper form during treatment to ensure optimal strengthening of each targeted muscle group.  Machines utilized included:Torso rotation, Leg Ext, Leg Curl, Chest Press, and RowingTriceps, Biceps, Hip Abd, and Leg Press     Balance/Neuromuscular Re-Education    MedX testing: Patient  received neuromuscular education to engage spinal musculature correctly for motor control and engagement of musculature for 15 minutes including the MedX exercise component and practice and standard testing. MedX dynamic exercise and baseline isometric test performed with instructions to guide the patient safely through the testing procedure. Patient instructed to perform isometric test correctly and safely while building to an optimal force with a pain-free effort. Patient also instructed that they should feel support/pressure from MedX restraints but no pain/discomfort, and encouraged to report any pain to therapist. Patient demonstrated appropriate understanding of information and tolerance of test.  Education regarding purpose of test, safety during test given, and reviewed possible more soreness and strategies.             7/8/2025     8:02 AM   [facility]   Visit Number 4   VAS Pain Rating 2   Time 5   Extension in Lying 10   Lumbar Weight 80 lbs   Repetitions 18   Rating of Perceived Exertion 6   Ice - Z Lie (in min.) 5         *DECREASE RESISTANCE NV TO 70 FT/LBS*         Time Entry(in minutes):  Hot/Cold Pack Time Entry: 5  Neuromuscular Re-Education Time Entry: 10  Therapeutic Exercise Time Entry: 40    Assessment & Plan   Assessment: Patient presents to therapy with minimal LBP. She performed exercises well without exhibiting exacerbation of symptoms and reports of no discomfort and  ability to lift L leg higher with prone hip extensions. Added Cat/Camel and open book stretch with good tolerance to mobility. Lumbar MedX resistance resumed 80 ft/lbs with completion of 18 reps at 6/10 RPE. Per prior pt, pPlan to decrease resistance to 70 ft/lbs NV for better patient tolerance. All of peripheral circuit performed without difficulty. Will progress HB protocol as tolerated by patient.   Evaluation/Treatment Tolerance: Patient tolerated treatment well    The patient will continue to benefit from skilled outpatient physical therapy in order to address the deficits listed in the problem list on the initial evaluation, provide patient and family education, and maximize the patients level of independence in the home and community environments.     The patient's spiritual, cultural, and educational needs were considered, and the patient is agreeable to the plan of care and goals.           Plan: Continue with established Plan of Care towards established PT goals.    Goals:   Active       Long term goals: 10 weeks or 20 visits        Pt will demonstrate increased lumbar ROM by at least 6 degrees from initial ROM value, resulting in improved ability to perform functional forward bending while standing and sitting. (Ongoing)       Start:  06/24/25    Expected End:  09/16/25            Pt will demonstrate increased MedX average isometric strength value by 10% from MIDPOINT TEST resulting in improved ability to perform bending, lifting, and carrying activities safely and confidently. (Ongoing)       Start:  06/24/25    Expected End:  09/16/25            Pt to demonstrate ability to independently control and reduce their pain through posture positioning and mechanical movements throughout a typical day. (Ongoing)       Start:  06/24/25    Expected End:  09/16/25            Pt will demonstrate reduced pain and improved functional outcomes as reported on the FOTO by reaching an intake score of >/= 59% functional  ability in order to demonstrate subjective improvement in patient's condition.  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 (Ongoing)       Start:  06/24/25    Expected End:  09/16/25            Pt will demonstrate independence with the HEP at discharge. (Ongoing)       Start:  06/24/25    Expected End:  09/16/25               Short term goals:  6 weeks or 10 visits       Pt will demonstrate increased lumbar ROM by at least 3 degrees from the initial ROM value with improvements noted in functional ROM and ability to perform ADLs. (Ongoing)       Start:  06/24/25    Expected End:  08/05/25            Pt will demonstrate increased MedX average isometric strength value by 15% from initial test resulting in improved ability to perform bending, lifting, and carrying activities safely, confidently. (Ongoing)       Start:  06/24/25    Expected End:  08/05/25            Pt will report a reduction in worst pain score by 1-2 points for improved tolerance for static sitting. (Ongoing)       Start:  06/24/25    Expected End:  08/05/25            Pt able to perform HEP correctly with minimal cueing or supervision from therapist to encourage independent management of symptoms. (Ongoing)       Start:  06/24/25    Expected End:  08/05/25                Maria Isabel Galloway, PT

## 2025-07-10 ENCOUNTER — CLINICAL SUPPORT (OUTPATIENT)
Dept: REHABILITATION | Facility: OTHER | Age: 45
End: 2025-07-10
Payer: OTHER GOVERNMENT

## 2025-07-10 DIAGNOSIS — R29.898 DECREASED STRENGTH OF TRUNK AND BACK: Primary | ICD-10-CM

## 2025-07-10 DIAGNOSIS — R68.89 DECREASED FUNCTIONAL ACTIVITY TOLERANCE: ICD-10-CM

## 2025-07-10 PROCEDURE — 97110 THERAPEUTIC EXERCISES: CPT | Mod: CQ

## 2025-07-10 PROCEDURE — 97112 NEUROMUSCULAR REEDUCATION: CPT | Mod: CQ

## 2025-07-10 NOTE — PROGRESS NOTES
"    Outpatient Rehab    Physical Therapy Visit    Patient Name: Olga Melchor  MRN: [id number]  YOB: 1980  Encounter Date: 7/10/2025    Therapy Diagnosis:   Encounter Diagnoses   Name Primary?    Decreased strength of trunk and back Yes    Decreased functional activity tolerance        Physician: ABNER Horvath NP    Physician Orders: Eval and Treat  Medical Diagnosis: Degeneration of intervertebral disc of lumbar region with discogenic back pain and lower extremity pain  Surgical Diagnosis: Not applicable for this Episode   Surgical Date: Not applicable for this Episode  Days Since Last Surgery: Not applicable for this Episode    Visit # / Visits Authorized:  5/20 + Eval  Insurance Authorization Period: 6/24/2025 to 12/31/2025  Date of Evaluation: 6/24/2025  Plan of Care Certification: 6/25/2025 to 9/16/2025      PT/PTA:   1/5  Number of PTA visits since last PT visit:   Time In:   0800  Time Out:  0900  Total Time (in minutes):   60  Total Billable Time (in minutes): 55     FOTO:  Intake Score:  %  Survey Score 2:  %  Survey Score 3:  %    Precautions:       Subjective   Olga reports fatigue die to increased hrs at work, pain is about baseline 2/10.         Objective            Isometric Testing on Med X equipment: Testing administered by PT    Test Initial Midpoint Final   Spine area:Lumbar      Date 6/30/2025     ROM 0-45 deg     Max Peak Torque 159      Min Peak Torque 38      Flex/Ext Ratio 4.18:1     % below normative data 27%     % gain from initial test Not available visit 1          Treatment:  Therapeutic Exercise  TE 1: NuStep 5'  TE 2: Prone on elbows 1' -- Prone press up x10  TE 3: Extension in standing x10 NP   --->    Prone alternating hip ext x10  TE 4: Prone hip extension x10  TE 5: LTR x10  TE 6: seated thoracic extensions, x10x5"  TE 7: PPT, x10x5" ------>PPT + march,  TE 8: Bridges, x10x5"  TE 9: Cat/Camel x10 -- open book stretch x10  TE 10: Updoming visits: TrA isometric, pallof " press, PPT + march, PPT + 90/90  Balance/Neuromuscular Re-Education  NMR 1: Lumbar Medx Testing        Therapeutic Exercise  Peripheral muscle strengthening which included one set of 15-20 repetitions at a slow and controlled 10-13 second per rep pace focused on strengthening supporting musculature in order to improve body mechanics and functional mobility. Patient and therapist focused on proper form during treatment to ensure optimal strengthening of each targeted muscle group.  Machines utilized included:Torso rotation, Leg Ext, Leg Curl, Chest Press, and RowingTriceps, Biceps, Hip Abd, and Leg Press     Balance/Neuromuscular Re-Education    MedX testing: Patient  received neuromuscular education to engage spinal musculature correctly for motor control and engagement of musculature for 15 minutes including the MedX exercise component and practice and standard testing. MedX dynamic exercise and baseline isometric test performed with instructions to guide the patient safely through the testing procedure. Patient instructed to perform isometric test correctly and safely while building to an optimal force with a pain-free effort. Patient also instructed that they should feel support/pressure from MedX restraints but no pain/discomfort, and encouraged to report any pain to therapist. Patient demonstrated appropriate understanding of information and tolerance of test.  Education regarding purpose of test, safety during test given, and reviewed possible more soreness and strategies.             7/10/2025     8:31 AM   [facility]   Visit Number 5   VAS Pain Rating 2   Time 5   Extension in Lying 10   Lumbar Weight 70 lbs   Repetitions 20   Rating of Perceived Exertion 6     *Increase by no more than 5%  Time Entry(in minutes):  Hot/Cold Pack Time Entry: 5  Neuromuscular Re-Education Time Entry: 10  Therapeutic Exercise Time Entry: 45    Assessment & Plan   Assessment: Shayna presents to  with minimal LBP and  increased fatigue. Treatment continued with lumbopelvic/core flexibility and strengthening ex's adding supine march c/ PPT to challenge core stability. She was able to perform without increased symptoms. Lumbar MedX resistance reduced to 70 ft/lbs for better tolerance. Pt completed of 20 reps at 6/10 RPE. Remind pt to stay within the 3,4,5 PRE. All of peripheral circuit performed without difficulty. Will progress HB protocol as tolerated by patient.      *Increase by no more than 5%  The patient will continue to benefit from skilled outpatient physical therapy in order to address the deficits listed in the problem list on the initial evaluation, provide patient and family education, and maximize the patients level of independence in the home and community environments.     The patient's spiritual, cultural, and educational needs were considered, and the patient is agreeable to the plan of care and goals.           Plan:  Cont POC    Goals:   Active       Long term goals: 10 weeks or 20 visits        Pt will demonstrate increased lumbar ROM by at least 6 degrees from initial ROM value, resulting in improved ability to perform functional forward bending while standing and sitting. (Ongoing)       Start:  06/24/25    Expected End:  09/16/25            Pt will demonstrate increased MedX average isometric strength value by 10% from MIDPOINT TEST resulting in improved ability to perform bending, lifting, and carrying activities safely and confidently. (Ongoing)       Start:  06/24/25    Expected End:  09/16/25            Pt to demonstrate ability to independently control and reduce their pain through posture positioning and mechanical movements throughout a typical day. (Ongoing)       Start:  06/24/25    Expected End:  09/16/25            Pt will demonstrate reduced pain and improved functional outcomes as reported on the FOTO by reaching an intake score of >/= 59% functional ability in order to demonstrate subjective  improvement in patient's condition.  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 (Ongoing)       Start:  06/24/25    Expected End:  09/16/25            Pt will demonstrate independence with the HEP at discharge. (Ongoing)       Start:  06/24/25    Expected End:  09/16/25               Short term goals:  6 weeks or 10 visits       Pt will demonstrate increased lumbar ROM by at least 3 degrees from the initial ROM value with improvements noted in functional ROM and ability to perform ADLs. (Ongoing)       Start:  06/24/25    Expected End:  08/05/25            Pt will demonstrate increased MedX average isometric strength value by 15% from initial test resulting in improved ability to perform bending, lifting, and carrying activities safely, confidently. (Ongoing)       Start:  06/24/25    Expected End:  08/05/25            Pt will report a reduction in worst pain score by 1-2 points for improved tolerance for static sitting. (Ongoing)       Start:  06/24/25    Expected End:  08/05/25            Pt able to perform HEP correctly with minimal cueing or supervision from therapist to encourage independent management of symptoms. (Ongoing)       Start:  06/24/25    Expected End:  08/05/25                  Karely Paulson PTA

## 2025-07-14 ENCOUNTER — CLINICAL SUPPORT (OUTPATIENT)
Dept: REHABILITATION | Facility: OTHER | Age: 45
End: 2025-07-14
Payer: OTHER GOVERNMENT

## 2025-07-14 DIAGNOSIS — R68.89 DECREASED FUNCTIONAL ACTIVITY TOLERANCE: ICD-10-CM

## 2025-07-14 DIAGNOSIS — R29.898 DECREASED STRENGTH OF TRUNK AND BACK: Primary | ICD-10-CM

## 2025-07-14 PROCEDURE — 97110 THERAPEUTIC EXERCISES: CPT

## 2025-07-14 PROCEDURE — 97112 NEUROMUSCULAR REEDUCATION: CPT

## 2025-07-14 NOTE — PROGRESS NOTES
"    Outpatient Rehab    Physical Therapy Visit    Patient Name: Olga Melchor  MRN: [id number]  YOB: 1980  Encounter Date: 7/14/2025    Therapy Diagnosis:   Encounter Diagnoses   Name Primary?    Decreased strength of trunk and back Yes    Decreased functional activity tolerance        Physician: ABNER Horvath NP    Physician Orders: Eval and Treat  Medical Diagnosis: Degeneration of intervertebral disc of lumbar region with discogenic back pain and lower extremity pain  Surgical Diagnosis: Not applicable for this Episode   Surgical Date: Not applicable for this Episode  Days Since Last Surgery: Not applicable for this Episode    Visit # / Visits Authorized:  6/20   Insurance Authorization Period: 6/24/2025 to 12/31/2025  Date of Evaluation: 6/24/2025  Plan of Care Certification: 6/25/2025 to 9/16/2025      PT/PTA: PT   Number of PTA visits since last PT visit:0  Time In: 0800   Time Out: 0854  Total Time (in minutes): 54   Total Billable Time (in minutes): 49    FOTO:  Intake Score:  %  Survey Score 2:  %  Survey Score 3:  %    Precautions:       Subjective   Olga resports she feels like she is getting progressivly better and feeling pretty good today. Yesterday she vaccumed and changed all the sheets in the house. After she was hurting and eas able to rest..  Pain reported as 2/10.      Objective            Isometric Testing on Med X equipment: Testing administered by PT    Test Initial Midpoint Final   Spine area:Lumbar      Date 6/30/2025     ROM 0-45 deg     Max Peak Torque 159      Min Peak Torque 38      Flex/Ext Ratio 4.18:1     % below normative data 27%     % gain from initial test Not available visit 1          Treatment:  Therapeutic Exercise  TE 1: NuStep 5'  TE 2: Prone on elbows 1' -- Prone press up x10  TE 4: Prone hip extension x10 single -- Prone bilat hip extension  TE 7: PPT, x5x5" ------>PPT + march (NP)  TE 8: Bridges, x10x5"  TE 9: Cat/Camel x10 -- open book stretch " x10  Balance/Neuromuscular Re-Education  NMR 1: Lumbar Medx  NMR 2: TrA isometric with ball x10  NMR 3: TrA isometric + SLRx10  NMR 4: Pallof press GTB x12      Therapeutic Exercise  Peripheral muscle strengthening which included one set of 15-20 repetitions at a slow and controlled 10-13 second per rep pace focused on strengthening supporting musculature in order to improve body mechanics and functional mobility. Patient and therapist focused on proper form during treatment to ensure optimal strengthening of each targeted muscle group.  Machines utilized included:Torso rotation, Leg Ext, Leg Curl, Chest Press, and RowingTriceps, Biceps, Hip Abd, and Leg Press     Balance/Neuromuscular Re-Education    MedX testing: Patient  received neuromuscular education to engage spinal musculature correctly for motor control and engagement of musculature for 15 minutes including the MedX exercise component and practice and standard testing. MedX dynamic exercise and baseline isometric test performed with instructions to guide the patient safely through the testing procedure. Patient instructed to perform isometric test correctly and safely while building to an optimal force with a pain-free effort. Patient also instructed that they should feel support/pressure from MedX restraints but no pain/discomfort, and encouraged to report any pain to therapist. Patient demonstrated appropriate understanding of information and tolerance of test.  Education regarding purpose of test, safety during test given, and reviewed possible more soreness and strategies.             7/14/2025     8:30 AM   [facility]   Visit Number 6   VAS Pain Rating 2   Time 5   Extension in Lying 10   Lumbar Weight 74 lbs   Repetitions 15   Rating of Perceived Exertion 5   Ice - Z Lie (in min.) 5       *Increase by no more than 5%  Time Entry(in minutes):  Hot/Cold Pack Time Entry: 5  Neuromuscular Re-Education Time Entry: 15  Therapeutic Exercise Time  Entry: 39    Assessment & Plan   Assessment: Shayna presents to  with no verbal complaints of LBP. Treatment resumed with stability exercises and progressed prone hip extension exercise to bilateral, added TrA isometric, and standing pallof press for trunk and spine stability with functional strength. She was able to perform without increased symptoms. Lumbar MedX resistance increased to 74 ft/lbs, she was able to complete 15 reps at 5/10 RPE.  All of peripheral circuit performed without difficulty. Will progress  protocol as tolerated by patient.   Evaluation/Treatment Tolerance: Patient tolerated treatment well    The patient will continue to benefit from skilled outpatient physical therapy in order to address the deficits listed in the problem list on the initial evaluation, provide patient and family education, and maximize the patients level of independence in the home and community environments.     The patient's spiritual, cultural, and educational needs were considered, and the patient is agreeable to the plan of care and goals.           Plan: Continue with established Plan of Care towards established PT goals.    Goals:   Active       Long term goals: 10 weeks or 20 visits        Pt will demonstrate increased lumbar ROM by at least 6 degrees from initial ROM value, resulting in improved ability to perform functional forward bending while standing and sitting. (Ongoing)       Start:  06/24/25    Expected End:  09/16/25            Pt will demonstrate increased MedX average isometric strength value by 10% from MIDPOINT TEST resulting in improved ability to perform bending, lifting, and carrying activities safely and confidently. (Ongoing)       Start:  06/24/25    Expected End:  09/16/25            Pt to demonstrate ability to independently control and reduce their pain through posture positioning and mechanical movements throughout a typical day. (Ongoing)       Start:  06/24/25    Expected End:  09/16/25             Pt will demonstrate reduced pain and improved functional outcomes as reported on the FOTO by reaching an intake score of >/= 59% functional ability in order to demonstrate subjective improvement in patient's condition.  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 (Ongoing)       Start:  06/24/25    Expected End:  09/16/25            Pt will demonstrate independence with the HEP at discharge. (Ongoing)       Start:  06/24/25    Expected End:  09/16/25               Short term goals:  6 weeks or 10 visits       Pt will demonstrate increased lumbar ROM by at least 3 degrees from the initial ROM value with improvements noted in functional ROM and ability to perform ADLs. (Ongoing)       Start:  06/24/25    Expected End:  08/05/25            Pt will demonstrate increased MedX average isometric strength value by 15% from initial test resulting in improved ability to perform bending, lifting, and carrying activities safely, confidently. (Ongoing)       Start:  06/24/25    Expected End:  08/05/25            Pt will report a reduction in worst pain score by 1-2 points for improved tolerance for static sitting. (Ongoing)       Start:  06/24/25    Expected End:  08/05/25            Pt able to perform HEP correctly with minimal cueing or supervision from therapist to encourage independent management of symptoms. (Ongoing)       Start:  06/24/25    Expected End:  08/05/25                  Maria Isabel Galloway, PT

## 2025-07-17 ENCOUNTER — CLINICAL SUPPORT (OUTPATIENT)
Dept: REHABILITATION | Facility: OTHER | Age: 45
End: 2025-07-17
Payer: OTHER GOVERNMENT

## 2025-07-17 DIAGNOSIS — R68.89 DECREASED FUNCTIONAL ACTIVITY TOLERANCE: ICD-10-CM

## 2025-07-17 DIAGNOSIS — R29.898 DECREASED STRENGTH OF TRUNK AND BACK: Primary | ICD-10-CM

## 2025-07-17 PROCEDURE — 97112 NEUROMUSCULAR REEDUCATION: CPT | Mod: CQ

## 2025-07-17 PROCEDURE — 97110 THERAPEUTIC EXERCISES: CPT | Mod: CQ

## 2025-07-17 NOTE — PROGRESS NOTES
"    Outpatient Rehab    Physical Therapy Visit    Patient Name: Olga Melchor  MRN: [id number]  YOB: 1980  Encounter Date: 7/17/2025    Therapy Diagnosis:   Encounter Diagnoses   Name Primary?    Decreased strength of trunk and back Yes    Decreased functional activity tolerance          Physician: ABNER Horvath NP    Physician Orders: Eval and Treat  Medical Diagnosis: Degeneration of intervertebral disc of lumbar region with discogenic back pain and lower extremity pain  Surgical Diagnosis: Not applicable for this Episode   Surgical Date: Not applicable for this Episode  Days Since Last Surgery: Not applicable for this Episode    Visit # / Visits Authorized:  7/20   Insurance Authorization Period: 6/24/2025 to 12/31/2025  Date of Evaluation: 6/24/2025  Plan of Care Certification: 6/25/2025 to 9/16/2025     INSURANCE and OUTCOMES: Fee for Service with FOTO Outcomes 1/3     Precautions: standard, OA   PT/PTA: PTA   Number of PTA visits since last PT visit:1  Time In:   0800  Time Out:  0900  Total Time (in minutes):   60  Total Billable Time (in minutes):  55    FOTO:  Intake Score: 53 %  Survey Score 2:  %  Survey Score 3:  %    Precautions:       Subjective   Olga resports basline LBP. Pt reports ease with ex and ability to complete household chores..  Pain reported as 2/10.      Objective            Isometric Testing on Med X equipment: Testing administered by PT    Test Initial Midpoint Final   Spine area:Lumbar      Date 6/30/2025     ROM 0-45 deg     Max Peak Torque 159      Min Peak Torque 38      Flex/Ext Ratio 4.18:1     % below normative data 27%     % gain from initial test Not available visit 1          Treatment:  Therapeutic Exercise  TE 1: NuStep 5'  TE 2: Prone on elbows 1' -- Prone press up x10  TE 4: Prone hip extension x10 single -- Prone bilat hip extension x10  TE 8: Bridges, x10x5"  TE 9: Cat/Camel x10 -- open book stretch x10  Balance/Neuromuscular Re-Education  NMR 1: Lumbar " Medx  NMR 3: TrA isometric + SLRx10  NMR 4: Pallof press GTB x12      Therapeutic Exercise  Peripheral muscle strengthening which included one set of 15-20 repetitions at a slow and controlled 10-13 second per rep pace focused on strengthening supporting musculature in order to improve body mechanics and functional mobility. Patient and therapist focused on proper form during treatment to ensure optimal strengthening of each targeted muscle group.  Machines utilized included:Torso rotation, Leg Ext, Leg Curl, Chest Press, and RowingTriceps, Biceps, Hip Abd, and Leg Press     Balance/Neuromuscular Re-Education    MedX testing: Patient  received neuromuscular education to engage spinal musculature correctly for motor control and engagement of musculature for 15 minutes including the MedX exercise component and practice and standard testing. MedX dynamic exercise and baseline isometric test performed with instructions to guide the patient safely through the testing procedure. Patient instructed to perform isometric test correctly and safely while building to an optimal force with a pain-free effort. Patient also instructed that they should feel support/pressure from MedX restraints but no pain/discomfort, and encouraged to report any pain to therapist. Patient demonstrated appropriate understanding of information and tolerance of test.  Education regarding purpose of test, safety during test given, and reviewed possible more soreness and strategies.           7/14/2025     8:30 AM   [facility]   Visit Number 6   VAS Pain Rating 2   Time 5   Extension in Lying 10   Lumbar Weight 74 lbs   Repetitions 15   Rating of Perceived Exertion 5   Ice - Z Lie (in min.) 5       *Increase by no more than 5%  Time Entry(in minutes):       Assessment & Plan   Assessment:         The patient will continue to benefit from skilled outpatient physical therapy in order to address the deficits listed in the problem list on the  initial evaluation, provide patient and family education, and maximize the patients level of independence in the home and community environments.     The patient's spiritual, cultural, and educational needs were considered, and the patient is agreeable to the plan of care and goals.           Plan: Cont POC     Goals:   Active       Long term goals: 10 weeks or 20 visits        Pt will demonstrate increased lumbar ROM by at least 6 degrees from initial ROM value, resulting in improved ability to perform functional forward bending while standing and sitting. (Ongoing)       Start:  06/24/25    Expected End:  09/16/25            Pt will demonstrate increased MedX average isometric strength value by 10% from MIDPOINT TEST resulting in improved ability to perform bending, lifting, and carrying activities safely and confidently. (Ongoing)       Start:  06/24/25    Expected End:  09/16/25            Pt to demonstrate ability to independently control and reduce their pain through posture positioning and mechanical movements throughout a typical day. (Ongoing)       Start:  06/24/25    Expected End:  09/16/25            Pt will demonstrate reduced pain and improved functional outcomes as reported on the FOTO by reaching an intake score of >/= 59% functional ability in order to demonstrate subjective improvement in patient's condition.  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 (Ongoing)       Start:  06/24/25    Expected End:  09/16/25            Pt will demonstrate independence with the HEP at discharge. (Ongoing)       Start:  06/24/25    Expected End:  09/16/25               Short term goals:  6 weeks or 10 visits       Pt will demonstrate increased lumbar ROM by at least 3  degrees from the initial ROM value with improvements noted in functional ROM and ability to perform ADLs. (Ongoing)       Start:  06/24/25    Expected End:  08/05/25            Pt will demonstrate increased MedX average isometric strength value by 15% from initial test resulting in improved ability to perform bending, lifting, and carrying activities safely, confidently. (Ongoing)       Start:  06/24/25    Expected End:  08/05/25            Pt will report a reduction in worst pain score by 1-2 points for improved tolerance for static sitting. (Ongoing)       Start:  06/24/25    Expected End:  08/05/25            Pt able to perform HEP correctly with minimal cueing or supervision from therapist to encourage independent management of symptoms. (Ongoing)       Start:  06/24/25    Expected End:  08/05/25                    Karely Paulson, PTA

## 2025-07-18 DIAGNOSIS — M51.362 DEGENERATION OF INTERVERTEBRAL DISC OF LUMBAR REGION WITH DISCOGENIC BACK PAIN AND LOWER EXTREMITY PAIN: ICD-10-CM

## 2025-07-21 ENCOUNTER — TELEPHONE (OUTPATIENT)
Dept: ENDOSCOPY | Facility: HOSPITAL | Age: 45
End: 2025-07-21
Payer: OTHER GOVERNMENT

## 2025-07-21 ENCOUNTER — OFFICE VISIT (OUTPATIENT)
Dept: GASTROENTEROLOGY | Facility: CLINIC | Age: 45
End: 2025-07-21
Payer: OTHER GOVERNMENT

## 2025-07-21 DIAGNOSIS — R13.10 DYSPHAGIA, UNSPECIFIED TYPE: ICD-10-CM

## 2025-07-21 DIAGNOSIS — K21.9 GASTROESOPHAGEAL REFLUX DISEASE, UNSPECIFIED WHETHER ESOPHAGITIS PRESENT: Primary | ICD-10-CM

## 2025-07-21 DIAGNOSIS — K29.60 REFLUX GASTRITIS: Primary | ICD-10-CM

## 2025-07-21 PROCEDURE — 98006 SYNCH AUDIO-VIDEO EST MOD 30: CPT | Mod: 95,,, | Performed by: STUDENT IN AN ORGANIZED HEALTH CARE EDUCATION/TRAINING PROGRAM

## 2025-07-21 RX ORDER — MELOXICAM 15 MG/1
15 TABLET ORAL DAILY
Qty: 30 TABLET | Refills: 1 | Status: SHIPPED | OUTPATIENT
Start: 2025-07-21

## 2025-07-21 RX ORDER — ONDANSETRON 8 MG/1
8 TABLET, FILM COATED ORAL EVERY 8 HOURS PRN
Qty: 30 TABLET | Refills: 1 | Status: SHIPPED | OUTPATIENT
Start: 2025-07-21 | End: 2025-08-18

## 2025-07-21 NOTE — TELEPHONE ENCOUNTER
Patient is scheduled for a Upper Endoscopy (EGD) on 7/26/25 with Dr. GET Elizalde  Referral for procedure from Clinic visit

## 2025-07-21 NOTE — PROGRESS NOTES
[facility] Telemedicine Consult Note    The patient location is: home   The chief complaint leading to consultation is: IBS-D, reflux    Visit type: audiovisual    Face to Face time with patient: 15  30 minutes of total time spent on the encounter, which includes face to face time and non-face to face time preparing to see the patient (eg, review of tests), Obtaining and/or reviewing separately obtained history, Documenting clinical information in the electronic or other health record, Independently interpreting results (not separately reported) and communicating results to the patient/family/caregiver, or Care coordination (not separately reported).       Each patient to whom he or she provides medical services by telemedicine is:  (1) informed of the relationship between the physician and patient and the respective role of any other health care provider with respect to management of the patient; and (2) notified that he or she may decline to receive medical services by telemedicine and may withdraw from such care at any time.       PCP:   Stefanie Baltazar       Referring MD:  No referring provider defined for this encounter.    HPI:  This is a 45 y.o. female here for follow up of IBS-D and reflux. Last seen in 2024. PMHx of ADHD, Depression, HLD, HTN, EHSAN, PTSD. She was previously following at [facility] prior to moving here in 12/2023. She was diagnosed with GERD and IBS-D based on their work up. 2020 work up showed normal celiac serologies. Stool studies showed normal fecal fat and borderline elevations in calprotectin. Infectious stool studies were negative.  She had cscope in 2021 which was normal including TI. Biopsies of TI an colon were normal. She also had C diff on three occasions (first time in 2015 after her C section, second time in the same year, and third time in 2017 after having her second child). She was treated with vanc on each time. Third time she had to do a few  "round and a vanc taper. She also has hx of diverticulitis, confirmed by imaging 2021. Since then, she has been on levsin which she uses daily and this helps her diarrhea. Currently. She has mostly soft, formed stools 2-3 times daily which is normal for her. She still has flares of watery diarrhea with cramping which seems to be triggered by high fiber or "healthy" foods like greens. She has not seen a nutritionist before. Weight and appetite are stable. Has some food allergies to banana, avocados.      She also has history of reflux. In her 20s, she had severe reflux and had EGDs which showed an esophageal stricture which required serial dilations. At that time, she was having dysphagia. Since the dilations, she has been continued on PPI and no current dysphagia. Reflux symptoms are well controlled on PPI in AM and pepcid at night. Blood work 2/2024 showed normal CBC and CMP.      She is on buspar, PPI (nexium), levsin, zofran, pepcid at night.     Interval history:   Since last visit, she was been by GI nutrition which helped her symptoms a lot. She is able to avoid high fodmap trigger foods and she has learned how to cook certain foods that allow her to eat them without issue.takes levsin PRN when she is going to eat a meal that may cause symptoms and this helps. She is on nexium which controls her symptoms as long as she takes it. Every once in a while with large pills or meats she does have mild dysphagia. She was recently put on mobic. Also considering starting zepbound.      Imaging:  Reviewed      CT 2023   No acute intra-abdominal abnormality. IUD is in appropriate position.      Endoscopy:       Cscope 2021 at [place]     09/2021 Colonoscopy:   - The examined portion of the ileum was normal. Biopsied.  - The sigmoid colon, descending colon, transverse colon, ascending colon and cecum are  normal. Biopsied.  - Hemorrhoids.    1)  SMALL BOWEL, TERMINAL ILEUM, BIOPSY: SMALL INTESTINAL MUCOSA WITH NO " SIGNIFICANT HISTOPATHOLOGIC CHANGE.       2)  COLON, RANDOM, BIOPSY: COLONIC MUCOSA WITH NO SIGNIFICANT HISTOPATHOLOGIC CHANGE.      Assessment:    1. IBS-D   2. GERD   3. Dysphagia  4. History of peptic stricture     Currently, doing well on antispasmodics and low FODMAP diet. UTD on cscope. Her reflux is well controlled on PPI/H2 blocker combination and I do recommend to continue this while on NSAIDs and with her history of peptic strictures. Due to occasional dysphagia to pills and meats will repeat EGD      Recommendations:     - EGD for reflux and recurrent dysphagia   - B12, mag as she is on long term PPI. Cr wnl   - GI nutrition referral last visit   - Continue levsin   - Take PPI on empty stomach, 30 mins before meals   - H2 blocker for breakthrough symptoms   - GERD precautions discussed       RTC after endoscopy       Order summary:  Orders Placed This Encounter    Vitamin B12    Magnesium    ondansetron (ZOFRAN) 8 MG tablet         Thank you so much for allowing me to participate in the care of Shayna Elizalde MD

## 2025-07-21 NOTE — TELEPHONE ENCOUNTER
"  Message  Received: Today  Haily Elizalde MD Piglia, Ashley, RN  Caller: Unspecified (Today,  9:14 AM)  Procedure: EGD    Diagnosis: GERD, dysphagia    Procedure Timing: Within 12 weeks    *If within 4 weeks selected, please toby as high priority*    *If greater than 12 weeks, please select "5-12 weeks" and delay sending until 3 months prior to requested date*    Location: Any Site    Additional Scheduling Information: No scheduling concerns    Prep Specifications:N/A    Is the patient taking a GLP-1 Agonist:no    Have you attached a patient to this message: yes  "

## 2025-07-22 ENCOUNTER — CLINICAL SUPPORT (OUTPATIENT)
Dept: REHABILITATION | Facility: OTHER | Age: 45
End: 2025-07-22
Payer: OTHER GOVERNMENT

## 2025-07-22 DIAGNOSIS — R29.898 DECREASED STRENGTH OF TRUNK AND BACK: Primary | ICD-10-CM

## 2025-07-22 DIAGNOSIS — R68.89 DECREASED FUNCTIONAL ACTIVITY TOLERANCE: ICD-10-CM

## 2025-07-22 PROCEDURE — 97110 THERAPEUTIC EXERCISES: CPT

## 2025-07-22 PROCEDURE — 97112 NEUROMUSCULAR REEDUCATION: CPT

## 2025-07-22 NOTE — PROGRESS NOTES
Outpatient Rehab    Physical Therapy Progress Note    Patient Name: Olag Melchor  MRN: [id number]  YOB: 1980  Encounter Date: 7/22/2025    Therapy Diagnosis:   Encounter Diagnoses   Name Primary?    Decreased strength of trunk and back Yes    Decreased functional activity tolerance      Physician: ABNER Horvath NP    Physician Orders: Eval and Treat  Medical Diagnosis: Degeneration of intervertebral disc of lumbar region with discogenic back pain and lower extremity pain  Surgical Diagnosis: Not applicable for this Episode   Surgical Date: Not applicable for this Episode  Days Since Last Surgery: Not applicable for this Episode    Visit # / Visits Authorized:  7 / 20  Insurance Authorization Period: 6/24/2025 to 12/31/2025  Date of Evaluation: 6/24/2025  Plan of Care Certification: 6/25/2025 to 9/16/2025      PT/PTA: PT   Number of PTA visits since last PT visit:0  Time In: 0700   Time Out: 0755  Total Time (in minutes): 55   Total Billable Time (in minutes): 50    FOTO:  Intake Score (%): 53  Survey Score 2 (%): Not applicable for this Episode  Survey Score 3 (%): Not applicable for this Episode    Precautions:       Subjective   Pt reprots L side low back pain that caused discomfort with sleeping. She feels it may be due to prolonged sitting at work. She reports having a fancy office chair when inquiring lumbar support about. She does her stretches about 3 times a day. She was able to help her parents with cleaning their house over the weekend and her back felt fine.  Pain reported as 2/10.      Objective          Isometric Testing on Med X equipment: Testing administered by PT     Test Initial Midpoint Final   Spine area:Lumbar         Date 6/30/2025       ROM 0-45 deg       Max Peak Torque 159        Min Peak Torque 38        Flex/Ext Ratio 4.18:1       % below normative data 27%       % gain from initial test Not available visit 1          Treatment:  Therapeutic Exercise  TE 1: NuStep 5'  TE  "2: Prone on elbows 1' -- Prone press up x10  TE 4: Prone hip extension x5 single -- Prone bilat hip extension x10  TE 5: QL/LTR ittthjq98"x2  TE 7: Piriformis stretch 2x20"  TE 8: Bridges, x15x5" GTB  TE 9: Cat/Camel x10 -- open book stretch x10  Balance/Neuromuscular Re-Education  NMR 4: Pallof press GTB x12  NMR 5: Bird/Dog x10 not alternating  NMR 6: 90/90 isometric hold 20"x2       Therapeutic Exercise  Peripheral muscle strengthening which included one set of 15-20 repetitions at a slow and controlled 10-13 second per rep pace focused on strengthening supporting musculature in order to improve body mechanics and functional mobility. Patient and therapist focused on proper form during treatment to ensure optimal strengthening of each targeted muscle group.  Machines utilized included:Torso rotation, Leg Ext, Leg Curl, Chest Press, and RowingTriceps, Biceps, Hip Abd, and Leg Press      Balance/Neuromuscular Re-Education     MedX testing: Patient  received neuromuscular education to engage spinal musculature correctly for motor control and engagement of musculature for 15 minutes including the MedX exercise component and practice and standard testing. MedX dynamic exercise and baseline isometric test performed with instructions to guide the patient safely through the testing procedure. Patient instructed to perform isometric test correctly and safely while building to an optimal force with a pain-free effort. Patient also instructed that they should feel support/pressure from MedX restraints but no pain/discomfort, and encouraged to report any pain to therapist. Patient demonstrated appropriate understanding of information and tolerance of test.  Education regarding purpose of test, safety during test given, and reviewed possible more soreness and strategies.             7/22/2025     7:33 AM   [facility]   Visit Number 8   VAS Pain Rating 2   Time 5   Extension in Lying 10   Lumbar Weight 74 lbs "   Repetitions 18   Rating of Perceived Exertion 6   Ice - Z Lie (in min.) 5         Time Entry(in minutes):  Neuromuscular Re-Education Time Entry: 12  Therapeutic Exercise Time Entry: 38    Assessment & Plan   Assessment: Shayna presents to  with baseline LBP with L side soreness. Treatment continued with lumbopelvic/core flexibility and strengthening ex's which she was able to perform without complaints. Added piriformis stretch and QL stretch with good tolerance and reports of feeling more of a targeted stretch with QL on L. Lumbar MedX resistance increased to 74 ft/lbs, she was able to complete 18 reps at 5/10 RPE with 1 rest break around rep 15.  All of peripheral circuit performed without difficulty. Will progress  protocol as tolerated by patient.   Evaluation/Treatment Tolerance: Patient tolerated treatment well    The patient will continue to benefit from skilled outpatient physical therapy in order to address the deficits listed in the problem list on the initial evaluation, provide patient and family education, and maximize the patients level of independence in the home and community environments.     The patient's spiritual, cultural, and educational needs were considered, and the patient is agreeable to the plan of care and goals.           Plan: Continue with established Plan of Care towards established PT goals.    Goals:   Active       Long term goals: 10 weeks or 20 visits        Pt will demonstrate increased lumbar ROM by at least 6 degrees from initial ROM value, resulting in improved ability to perform functional forward bending while standing and sitting. (Ongoing)       Start:  06/24/25    Expected End:  09/16/25            Pt will demonstrate increased MedX average isometric strength value by 10% from MIDPOINT TEST resulting in improved ability to perform bending, lifting, and carrying activities safely and confidently. (Ongoing)       Start:  06/24/25    Expected End:  09/16/25             Pt to demonstrate ability to independently control and reduce their pain through posture positioning and mechanical movements throughout a typical day. (Ongoing)       Start:  06/24/25    Expected End:  09/16/25            Pt will demonstrate reduced pain and improved functional outcomes as reported on the FOTO by reaching an intake score of >/= 59% functional ability in order to demonstrate subjective improvement in patient's condition.  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 (Ongoing)       Start:  06/24/25    Expected End:  09/16/25            Pt will demonstrate independence with the HEP at discharge. (Ongoing)       Start:  06/24/25    Expected End:  09/16/25               Short term goals:  6 weeks or 10 visits       Pt will demonstrate increased lumbar ROM by at least 3 degrees from the initial ROM value with improvements noted in functional ROM and ability to perform ADLs. (Ongoing)       Start:  06/24/25    Expected End:  08/05/25            Pt will demonstrate increased MedX average isometric strength value by 15% from initial test resulting in improved ability to perform bending, lifting, and carrying activities safely, confidently. (Ongoing)       Start:  06/24/25    Expected End:  08/05/25            Pt will report a reduction in worst pain score by 1-2 points for improved tolerance for static sitting. (Ongoing)       Start:  06/24/25    Expected End:  08/05/25            Pt able to perform HEP correctly with minimal cueing or supervision from therapist to encourage independent management of symptoms. (Ongoing)       Start:  06/24/25    Expected End:  08/05/25                Maria Isabel Galloway, PT

## 2025-07-23 ENCOUNTER — LAB VISIT (OUTPATIENT)
Dept: LAB | Facility: HOSPITAL | Age: 45
End: 2025-07-23
Attending: STUDENT IN AN ORGANIZED HEALTH CARE EDUCATION/TRAINING PROGRAM
Payer: OTHER GOVERNMENT

## 2025-07-23 DIAGNOSIS — K29.60 REFLUX GASTRITIS: ICD-10-CM

## 2025-07-23 LAB
MAGNESIUM SERPL-MCNC: 2 MG/DL (ref 1.6–2.6)
VIT B12 SERPL-MCNC: 614 PG/ML (ref 210–950)

## 2025-07-23 PROCEDURE — 83735 ASSAY OF MAGNESIUM: CPT

## 2025-07-23 PROCEDURE — 36415 COLL VENOUS BLD VENIPUNCTURE: CPT

## 2025-07-23 PROCEDURE — 82607 VITAMIN B-12: CPT

## 2025-07-24 ENCOUNTER — ANESTHESIA EVENT (OUTPATIENT)
Dept: ENDOSCOPY | Facility: HOSPITAL | Age: 45
End: 2025-07-24
Payer: OTHER GOVERNMENT

## 2025-07-24 ENCOUNTER — CLINICAL SUPPORT (OUTPATIENT)
Dept: REHABILITATION | Facility: OTHER | Age: 45
End: 2025-07-24
Payer: OTHER GOVERNMENT

## 2025-07-24 DIAGNOSIS — R29.898 DECREASED STRENGTH OF TRUNK AND BACK: Primary | ICD-10-CM

## 2025-07-24 DIAGNOSIS — R68.89 DECREASED FUNCTIONAL ACTIVITY TOLERANCE: ICD-10-CM

## 2025-07-24 PROCEDURE — 97110 THERAPEUTIC EXERCISES: CPT | Mod: CQ

## 2025-07-24 PROCEDURE — 97112 NEUROMUSCULAR REEDUCATION: CPT | Mod: CQ

## 2025-07-24 NOTE — ANESTHESIA PREPROCEDURE EVALUATION
07/24/2025  Shayna Melchor is a 45 y.o., female.  Procedure: EGD (ESOPHAGOGASTRODUODENOSCOPY) (N/A)       Problem List[1]    Past Medical History:   Diagnosis Date    ADHD (attention deficit hyperactivity disorder)     Depression     Hyperlipidemia     Hypertension     EHSAN (obstructive sleep apnea)     wears cpap    PTSD (post-traumatic stress disorder)        ECHO: Results for orders placed during the hospital encounter of 09/25/24    Echo    Interpretation Summary    Left Ventricle: The left ventricle is normal in size. Normal wall thickness. There is concentric remodeling. There is normal systolic function with a visually estimated ejection fraction of 60 - 65%. There is normal diastolic function.    Right Ventricle: The right ventricular cavity size is at the upper limit of normal in size. Wall thickness is normal. Systolic function is normal.    Pulmonary Artery: Pulmonary artery pressure could not be estimated due to a lack of tricuspid valve regurgitation.  However, the RVOT acceleration time is at least 110ms, consistent with normal PA pressures.    IVC/SVC: Normal venous pressure at 3 mmHg.      There is no height or weight on file to calculate BMI.    Tobacco Use: Low Risk  (6/4/2025)    Patient History     Smoking Tobacco Use: Never     Smokeless Tobacco Use: Never     Passive Exposure: Not on file       Social History     Substance and Sexual Activity   Drug Use Never        Alcohol Use: Not At Risk (4/2/2025)    AUDIT-C     Frequency of Alcohol Consumption: 2-4 times a month     Average Number of Drinks: 1 or 2     Frequency of Binge Drinking: Never       Review of patient's allergies indicates:   Allergen Reactions    Benadryl allergy-sinus Palpitations     tachycardia    Ciprofloxacin Hives, Shortness Of Breath and Other (See Comments)     hives    Diphenhydramine Shortness Of Breath,  Palpitations and Other (See Comments)     Other Reaction(s): Other        Fish containing products Other (See Comments), Anaphylaxis, Hives and Shortness Of Breath     White fish    Any type of fish.    Latex, natural rubber Hives    Nickel Hives and Rash         Airway:  No value filed.      Pre-op Assessment    I have reviewed the Patient Summary Reports.     I have reviewed the Nursing Notes. I have reviewed the NPO Status.   I have reviewed the Medications.     Review of Systems  Anesthesia Hx:  No problems with previous Anesthesia             Denies Family Hx of Anesthesia complications.    Denies Personal Hx of Anesthesia complications.                    Hematology/Oncology:  Hematology Normal   Oncology Normal                                   EENT/Dental:  EENT/Dental Normal           Cardiovascular:  Exercise tolerance: good   Hypertension           hyperlipidemia   ECG has been reviewed.                            Pulmonary:    Asthma    Sleep Apnea                Renal/:  Renal/ Normal                 Hepatic/GI:     GERD                Musculoskeletal:  Musculoskeletal Normal                Neurological:    Neuromuscular Disease,  Headaches                                 Endocrine:  Endocrine Normal            Dermatological:  Skin Normal    Psych:  Psychiatric History anxiety               Physical Exam  General: Well nourished, Cooperative and Alert    Airway:  Mallampati: II / II  Mouth Opening: Normal  TM Distance: Normal  Tongue: Normal  Neck ROM: Normal ROM        Anesthesia Plan  Type of Anesthesia, risks & benefits discussed:    Anesthesia Type: Gen Natural Airway  Intra-op Monitoring Plan: Standard ASA Monitors  Post Op Pain Control Plan: multimodal analgesia and IV/PO Opioids PRN  Induction:  IV  Informed Consent: Informed consent signed with the Patient and all parties understand the risks and agree with anesthesia plan.  All questions answered. Patient consented to blood products?  No  ASA Score: 2  Day of Surgery Review of History & Physical: H&P Update referred to the surgeon/provider.    Ready For Surgery From Anesthesia Perspective.     .           [1]   Patient Active Problem List  Diagnosis    ADHD (attention deficit hyperactivity disorder), combined type    Panic disorder    Hypertension    Post-traumatic stress disorder, chronic    Obstructive sleep apnea of adult    Mixed hyperlipidemia    Asthma in adult, mild intermittent, uncomplicated    Migraine headache    Major depressive disorder, recurrent, mild    Esophageal reflux    Degenerative disc disease, lumbar    Functional diarrhea (hx of multiple bouts of cdiff)    Class 3 severe obesity due to excess calories with serious comorbidity and body mass index (BMI) of 40.0 to 44.9 in adult    Decreased range of motion of trunk and back    Decreased strength of trunk and back    Postural imbalance    Dysmenorrhea    S/P TLH/BS    Carpal tunnel syndrome of right wrist    Right wrist pain    Decreased  strength of right hand    Pelvic floor dysfunction    Decreased functional activity tolerance

## 2025-07-24 NOTE — PROGRESS NOTES
"  Outpatient Rehab    Physical Therapy Progress Note    Patient Name: Olga Melchor  MRN: [id number]  YOB: 1980  Encounter Date: 7/24/2025    Therapy Diagnosis:   Encounter Diagnoses   Name Primary?    Decreased strength of trunk and back Yes    Decreased functional activity tolerance      Physician: ABNER Horvath NP    Physician Orders: Eval and Treat  Medical Diagnosis: Degeneration of intervertebral disc of lumbar region with discogenic back pain and lower extremity pain  Surgical Diagnosis: Not applicable for this Episode   Surgical Date: Not applicable for this Episode  Days Since Last Surgery: Not applicable for this Episode    Visit # / Visits Authorized:  9/20  Insurance Authorization Period: 6/24/2025 to 12/31/2025  Date of Evaluation: 6/24/2025  Plan of Care Certification: 6/25/2025 to 9/16/2025      PT/PTA: PTA   Number of PTA visits since last PT visit:1  Time In: 1130   Time Out: 1225  Total Time (in minutes): 55   Total Billable Time (in minutes): 50    FOTO:  Intake Score (%): 53  Survey Score 2 (%): Not applicable for this Episode  Survey Score 3 (%): Not applicable for this Episode    Precautions:       Subjective   Patient reports DAVID knee pain > lumbar upon entry. She reports lumbar pain varies from center, upper back, and L lumbar region, which depends on the day..  Pain reported as 2/10.      Objective          Isometric Testing on Med X equipment: Testing administered by PT     Test Initial Midpoint Final   Spine area:Lumbar         Date 6/30/2025       ROM 0-45 deg       Max Peak Torque 159        Min Peak Torque 38        Flex/Ext Ratio 4.18:1       % below normative data 27%       % gain from initial test Not available visit 1          Treatment:  Therapeutic Exercise  TE 1: NuStep 5'  TE 2: Prone on elbows 1' -- Prone press up x10  TE 5: QL/LTR stretch 10"x3  TE 7: Piriformis stretch 2x20"  TE 8: Bridges, x15x5" GTB  TE 10: Updoming visits: TrA isometric, pallof press, PPT + " "march, PPT + 90/90  Balance/Neuromuscular Re-Education  NMR 1: Medical MedX Treatment as follows:  Patient received neuromuscular education for 10 minutes via participation on the Medical MedX Machine. Therapist assisted patient in isolating and engaging spinal stabilization musculature in order to improve functional ability and postural control. Patient performed exercise with therapist guidance in order to accurately use pacer function, avoid valsalva, and optimally exert effort within a safe and effective range via the Brigitte Exertion Rating Scale. Patient instructed to perform at a midrange of exertion and to complete 15-20 repetitions within appropriate split time, with proper technique, and while maintaining safety.  NMR 2: TrA isometric with ball x10  NMR 3: TrA isometric + SLRx10  NMR 6: 90/90 isometric hold 20"x2           7/24/2025    12:23 PM   [facility]   Visit Number 9   VAS Pain Rating 2   Time 5   Extension in Lying 10   Flexion in Lying 10   Lumbar Weight 74 lbs   Repetitions 20   Rating of Perceived Exertion 7   Ice - Z Lie (in min.) 5           Therapeutic Exercise  Peripheral muscle strengthening which included one set of 15-20 repetitions at a slow and controlled 10-13 second per rep pace focused on strengthening supporting musculature in order to improve body mechanics and functional mobility. Patient and therapist focused on proper form during treatment to ensure optimal strengthening of each targeted muscle group.  Machines utilized included:Torso rotation, Leg Ext, Leg Curl, Chest Press, and RowingTriceps, Biceps, Hip Abd, and Leg Press        Time Entry(in minutes):  Neuromuscular Re-Education Time Entry: 12  Therapeutic Exercise Time Entry: 38    Assessment & Plan   Assessment:  Shayna presents to HB session with reports Mateo knee pain> lumbar pain. Treatment continued with lumbopelvic/core flexibility and strengthening ex's, which she was able to perform without complaints despite " weakness noted. Lumbar MedX resistance of 74 ft/lbs with completion of 20 reps at 7/10 RPE, requiring 2 rest break at 10 and 15.  All of peripheral circuit performed without difficulty. Will progress HB protocol as tolerated by patient.   Evaluation/Treatment Tolerance: Patient tolerated treatment well    The patient will continue to benefit from skilled outpatient physical therapy in order to address the deficits listed in the problem list on the initial evaluation, provide patient and family education, and maximize the patients level of independence in the home and community environments.     The patient's spiritual, cultural, and educational needs were considered, and the patient is agreeable to the plan of care and goals.           Plan: Continue with established Plan of Care towards established PT goals.    Goals:   Active       Long term goals: 10 weeks or 20 visits        Pt will demonstrate increased lumbar ROM by at least 6 degrees from initial ROM value, resulting in improved ability to perform functional forward bending while standing and sitting. (Ongoing)       Start:  06/24/25    Expected End:  09/16/25            Pt will demonstrate increased MedX average isometric strength value by 10% from MIDPOINT TEST resulting in improved ability to perform bending, lifting, and carrying activities safely and confidently. (Ongoing)       Start:  06/24/25    Expected End:  09/16/25            Pt to demonstrate ability to independently control and reduce their pain through posture positioning and mechanical movements throughout a typical day. (Ongoing)       Start:  06/24/25    Expected End:  09/16/25            Pt will demonstrate reduced pain and improved functional outcomes as reported on the FOTO by reaching an intake score of >/= 59% functional ability in order to demonstrate subjective improvement in patient's condition.  (Ongoing)       Start:  06/24/25    Expected End:  09/16/25            Pt will be able  to tolerate standing for baking without increased low back pain (Ongoing)       Start:  06/24/25    Expected End:  09/16/25            Pt will be able to walk 1 mile without increase in low back pain. (Ongoing)       Start:  06/24/25    Expected End:  09/16/25            Pt will demonstrate independence with the HEP at discharge. (Ongoing)       Start:  06/24/25    Expected End:  09/16/25               Short term goals:  6 weeks or 10 visits       Pt will demonstrate increased lumbar ROM by at least 3 degrees from the initial ROM value with improvements noted in functional ROM and ability to perform ADLs. (Ongoing)       Start:  06/24/25    Expected End:  08/05/25            Pt will demonstrate increased MedX average isometric strength value by 15% from initial test resulting in improved ability to perform bending, lifting, and carrying activities safely, confidently. (Ongoing)       Start:  06/24/25    Expected End:  08/05/25            Pt will report a reduction in worst pain score by 1-2 points for improved tolerance for static sitting. (Ongoing)       Start:  06/24/25    Expected End:  08/05/25            Pt able to perform HEP correctly with minimal cueing or supervision from therapist to encourage independent management of symptoms. (Ongoing)       Start:  06/24/25    Expected End:  08/05/25                Sharon Cano PTA

## 2025-07-26 ENCOUNTER — HOSPITAL ENCOUNTER (OUTPATIENT)
Facility: HOSPITAL | Age: 45
Discharge: HOME | End: 2025-07-26
Attending: STUDENT IN AN ORGANIZED HEALTH CARE EDUCATION/TRAINING PROGRAM | Admitting: STUDENT IN AN ORGANIZED HEALTH CARE EDUCATION/TRAINING PROGRAM
Payer: OTHER GOVERNMENT

## 2025-07-26 ENCOUNTER — ANESTHESIA (OUTPATIENT)
Dept: ENDOSCOPY | Facility: HOSPITAL | Age: 45
End: 2025-07-26
Payer: OTHER GOVERNMENT

## 2025-07-26 VITALS
DIASTOLIC BLOOD PRESSURE: 68 MMHG | HEIGHT: 65 IN | SYSTOLIC BLOOD PRESSURE: 116 MMHG | TEMPERATURE: 98 F | WEIGHT: 250 LBS | OXYGEN SATURATION: 100 % | RESPIRATION RATE: 19 BRPM | BODY MASS INDEX: 41.65 KG/M2 | HEART RATE: 75 BPM

## 2025-07-26 DIAGNOSIS — R13.10 DYSPHAGIA, UNSPECIFIED TYPE: ICD-10-CM

## 2025-07-26 DIAGNOSIS — K21.9 GERD (GASTROESOPHAGEAL REFLUX DISEASE): ICD-10-CM

## 2025-07-26 DIAGNOSIS — K21.9 GASTROESOPHAGEAL REFLUX DISEASE, UNSPECIFIED WHETHER ESOPHAGITIS PRESENT: Primary | ICD-10-CM

## 2025-07-26 PROCEDURE — 43249 ESOPH EGD DILATION <30 MM: CPT | Performed by: STUDENT IN AN ORGANIZED HEALTH CARE EDUCATION/TRAINING PROGRAM

## 2025-07-26 PROCEDURE — 37000009 HC ANESTHESIA EA ADD 15 MINS: Performed by: STUDENT IN AN ORGANIZED HEALTH CARE EDUCATION/TRAINING PROGRAM

## 2025-07-26 PROCEDURE — 63600175 PHARM REV CODE 636 W HCPCS: Performed by: STUDENT IN AN ORGANIZED HEALTH CARE EDUCATION/TRAINING PROGRAM

## 2025-07-26 PROCEDURE — C1726 CATH, BAL DIL, NON-VASCULAR: HCPCS | Performed by: STUDENT IN AN ORGANIZED HEALTH CARE EDUCATION/TRAINING PROGRAM

## 2025-07-26 PROCEDURE — 43249 ESOPH EGD DILATION <30 MM: CPT | Mod: 51,,, | Performed by: STUDENT IN AN ORGANIZED HEALTH CARE EDUCATION/TRAINING PROGRAM

## 2025-07-26 PROCEDURE — 43251 EGD REMOVE LESION SNARE: CPT | Performed by: STUDENT IN AN ORGANIZED HEALTH CARE EDUCATION/TRAINING PROGRAM

## 2025-07-26 PROCEDURE — 27200997: Performed by: STUDENT IN AN ORGANIZED HEALTH CARE EDUCATION/TRAINING PROGRAM

## 2025-07-26 PROCEDURE — 37000008 HC ANESTHESIA 1ST 15 MINUTES: Performed by: STUDENT IN AN ORGANIZED HEALTH CARE EDUCATION/TRAINING PROGRAM

## 2025-07-26 PROCEDURE — 88342 IMHCHEM/IMCYTCHM 1ST ANTB: CPT | Mod: TC,91 | Performed by: STUDENT IN AN ORGANIZED HEALTH CARE EDUCATION/TRAINING PROGRAM

## 2025-07-26 PROCEDURE — 43239 EGD BIOPSY SINGLE/MULTIPLE: CPT | Mod: XS | Performed by: STUDENT IN AN ORGANIZED HEALTH CARE EDUCATION/TRAINING PROGRAM

## 2025-07-26 PROCEDURE — 43239 EGD BIOPSY SINGLE/MULTIPLE: CPT | Mod: XS,,, | Performed by: STUDENT IN AN ORGANIZED HEALTH CARE EDUCATION/TRAINING PROGRAM

## 2025-07-26 PROCEDURE — 88342 IMHCHEM/IMCYTCHM 1ST ANTB: CPT | Mod: 26,,, | Performed by: PATHOLOGY

## 2025-07-26 PROCEDURE — 25000003 PHARM REV CODE 250: Performed by: STUDENT IN AN ORGANIZED HEALTH CARE EDUCATION/TRAINING PROGRAM

## 2025-07-26 PROCEDURE — 27201012 HC FORCEPS, HOT/COLD, DISP: Performed by: STUDENT IN AN ORGANIZED HEALTH CARE EDUCATION/TRAINING PROGRAM

## 2025-07-26 PROCEDURE — 43251 EGD REMOVE LESION SNARE: CPT | Mod: ,,, | Performed by: STUDENT IN AN ORGANIZED HEALTH CARE EDUCATION/TRAINING PROGRAM

## 2025-07-26 PROCEDURE — 88305 TISSUE EXAM BY PATHOLOGIST: CPT | Mod: 26,,, | Performed by: PATHOLOGY

## 2025-07-26 RX ORDER — FENTANYL CITRATE 50 UG/ML
INJECTION, SOLUTION INTRAMUSCULAR; INTRAVENOUS
Status: DISCONTINUED | OUTPATIENT
Start: 2025-07-26 | End: 2025-07-26

## 2025-07-26 RX ORDER — LIDOCAINE HYDROCHLORIDE 20 MG/ML
INJECTION INTRAVENOUS
Status: DISCONTINUED | OUTPATIENT
Start: 2025-07-26 | End: 2025-07-26

## 2025-07-26 RX ORDER — PROPOFOL 10 MG/ML
VIAL (ML) INTRAVENOUS
Status: DISCONTINUED | OUTPATIENT
Start: 2025-07-26 | End: 2025-07-26

## 2025-07-26 RX ORDER — DEXMEDETOMIDINE HYDROCHLORIDE 100 UG/ML
INJECTION, SOLUTION INTRAVENOUS
Status: DISCONTINUED | OUTPATIENT
Start: 2025-07-26 | End: 2025-07-26

## 2025-07-26 RX ORDER — SODIUM CHLORIDE 9 MG/ML
INJECTION, SOLUTION INTRAVENOUS CONTINUOUS
Status: DISCONTINUED | OUTPATIENT
Start: 2025-07-26 | End: 2025-07-26 | Stop reason: HOSPADM

## 2025-07-26 RX ORDER — PROPOFOL 10 MG/ML
VIAL (ML) INTRAVENOUS CONTINUOUS PRN
Status: DISCONTINUED | OUTPATIENT
Start: 2025-07-26 | End: 2025-07-26

## 2025-07-26 RX ADMIN — SODIUM CHLORIDE: 0.9 INJECTION, SOLUTION INTRAVENOUS at 10:07

## 2025-07-26 RX ADMIN — PROPOFOL 175 MCG/KG/MIN: 10 INJECTION, EMULSION INTRAVENOUS at 10:07

## 2025-07-26 RX ADMIN — FENTANYL CITRATE 25 MCG: 50 INJECTION, SOLUTION INTRAMUSCULAR; INTRAVENOUS at 10:07

## 2025-07-26 RX ADMIN — PROPOFOL 100 MG: 10 INJECTION, EMULSION INTRAVENOUS at 10:07

## 2025-07-26 RX ADMIN — LIDOCAINE HYDROCHLORIDE 60 MG: 20 INJECTION INTRAVENOUS at 10:07

## 2025-07-26 RX ADMIN — DEXMEDETOMIDINE HYDROCHLORIDE 12 MCG: 100 INJECTION, SOLUTION INTRAVENOUS at 10:07

## 2025-07-26 NOTE — PROVATION PATIENT INSTRUCTIONS
Discharge Summary/Instructions after an Endoscopic Procedure  Patient Name: Shayna Melchor  Patient MRN: [id number]  Patient   YOB: 1980 Saturday, July 26, 2025  Haily Elizalde MD  Dear patient,  As a result of recent federal legislation (The Federal Cures Act), you may   receive lab or pathology results from your procedure in your [facility]   account before your physician is able to contact you. Your physician or   their representative will relay the results to you with their   recommendations at their soonest availability.  Thank you,  RESTRICTIONS:  During your procedure today, you received medications for sedation.  These   medications may affect your judgment, balance and coordination.  Therefore,   for 24 hours, you have the following restrictions:   - DO NOT drive a car, operate machinery, make legal/financial decisions,   sign important papers or drink alcohol.    ACTIVITY:  Today: no heavy lifting, straining or running due to procedural   sedation/anesthesia.  The following day: return to full activity including work.  DIET:  Eat and drink normally unless instructed otherwise.     TREATMENT FOR COMMON SIDE EFFECTS:  - Mild abdominal pain, nausea, belching, bloating or excessive gas:  rest,   eat lightly and use a heating pad.  - Sore Throat: treat with throat lozenges and/or gargle with warm salt   water.  - Because air was used during the procedure, expelling large amounts of air   from your rectum or belching is normal.  - If a bowel prep was taken, you may not have a bowel movement for 1-3 days.    This is normal.  SYMPTOMS TO WATCH FOR AND REPORT TO YOUR PHYSICIAN:  1. Abdominal pain or bloating, other than gas cramps.  2. Chest pain.  3. Back pain.  4. Signs of infection such as: chills or fever occurring within 24 hours   after the procedure.  5. Rectal bleeding, which would show as bright red, maroon, or black stools.   (A tablespoon of blood from the rectum is not serious,  especially if   hemorrhoids are present.)  6. Vomiting.  7. Weakness or dizziness.  GO DIRECTLY TO THE NEAREST EMERGENCY ROOM IF YOU HAVE ANY OF THE FOLLOWING:      Difficulty breathing              Chills and/or fever over 101 F   Persistent vomiting and/or vomiting blood   Severe abdominal pain   Severe chest pain   Black, tarry stools   Bleeding- more than one tablespoon   Any other symptom or condition that you feel may need urgent attention  Your doctor recommends these additional instructions:  If any biopsies were taken, your doctors clinic will contact you in 1 to 2   weeks with any results.  - Discharge patient to home (ambulatory).   - Resume regular diet.   - Continue present medications.   - Await pathology results.  For questions, problems or results please call your physician - Haily Elizalde MD at Work:  [phone].  [facility] [place], EMERGENCY ROOM PHONE NUMBER: [phone]  IF A COMPLICATION OR EMERGENCY SITUATION ARISES AND YOU ARE UNABLE TO REACH   YOUR PHYSICIAN - GO DIRECTLY TO THE EMERGENCY ROOM.  Haily Elizalde MD  7/26/2025 10:35:26 AM  This report has been verified and signed electronically.  Dear patient,  As a result of recent federal legislation (The Federal Cures Act), you may   receive lab or pathology results from your procedure in your [facility]   account before your physician is able to contact you. Your physician or   their representative will relay the results to you with their   recommendations at their soonest availability.  Thank you,  PROVATION

## 2025-07-26 NOTE — ANESTHESIA POSTPROCEDURE EVALUATION
Anesthesia Post Evaluation    Patient: Shayna Melchor    Procedure(s) Performed: Procedure(s) (LRB):  EGD (ESOPHAGOGASTRODUODENOSCOPY) (N/A)    Final Anesthesia Type: general      Patient location during evaluation: PACU  Patient participation: Yes- Able to Participate  Level of consciousness: awake and alert  Post-procedure vital signs: reviewed and stable  Pain management: adequate  Airway patency: patent  EHSAN mitigation strategies: Intraoperative administration of CPAP, nasopharyngeal airway, or oral appliance during sedation  PONV status at discharge: No PONV  Anesthetic complications: no      Cardiovascular status: blood pressure returned to baseline  Respiratory status: unassisted  Hydration status: euvolemic  Follow-up not needed.              Vitals Value Taken Time   /68 07/26/25 10:57   Temp 36.8 °C (98.2 °F) 07/26/25 10:35   Pulse 75 07/26/25 10:58   Resp 26 07/26/25 10:58   SpO2 100 % 07/26/25 10:58   Vitals shown include unfiled device data.      Event Time   Out of Recovery 10:50:00         Pain/Panfilo Score: Panfilo Score: 10 (7/26/2025 10:54 AM)

## 2025-07-26 NOTE — TRANSFER OF CARE
"Anesthesia Transfer of Care Note    Patient: Shayna Melchor    Procedure(s) Performed: Procedure(s) (LRB):  EGD (ESOPHAGOGASTRODUODENOSCOPY) (N/A)    Patient location: PACU    Anesthesia Type: general    Transport from OR: Transported from OR on room air with adequate spontaneous ventilation    Post pain: adequate analgesia    Post assessment: no apparent anesthetic complications    Post vital signs: stable    Level of consciousness: awake    Nausea/Vomiting: no nausea/vomiting    Complications: none    Transfer of care protocol was followed    Last vitals: Visit Vitals  BP (!) 93/54 (BP Location: Left arm, Patient Position: Lying)   Pulse 76   Temp 36.8 °C (98.2 °F) (Temporal)   Resp 20   Ht 5' 5" (1.651 m)   Wt 113.4 kg (250 lb)   LMP 10/01/2024 (Approximate)   SpO2 100%   Breastfeeding No   BMI 41.60 kg/m²     " no

## 2025-07-26 NOTE — H&P
Short Stay Endoscopy History and Physical    PCP - Stefanie Baltazar MD  Referring Physician - PRE-ADMIT, ENDO -[facility]  No address on file    Procedure - EGD  ASA - per anesthesia  Mallampati - per anesthesia  History of Anesthesia problems - no  Family history Anesthesia problems -  no   Plan of anesthesia - General    HPI  45 y.o. female  Reason for procedure:  Gastroesophageal reflux disease, unspecified whether esophagitis present [K21.9]  Dysphagia, unspecified type [R13.10]      ROS:  Constitutional: No fevers, chills, No weight loss  CV: No chest pain  Pulm: No cough, No shortness of breath  GI: see HPI    Medical History:  has a past medical history of ADHD (attention deficit hyperactivity disorder), Depression, Hyperlipidemia, Hypertension, EHSAN (obstructive sleep apnea), and PTSD (post-traumatic stress disorder).    Surgical History:  has a past surgical history that includes  section (2015, 2017); Tubal ligation (2017); Sinus surgery (); Laparoscopic total hysterectomy (N/A, 10/23/2024); Laparoscopic salpingectomy (Right, 10/23/2024); and Endoscopic carpal tunnel release (Right, 2024).    Family History: family history includes Arthritis in her mother and paternal grandmother; Asthma in her maternal grandmother; Breast cancer in her paternal aunt, paternal aunt, and paternal grandfather; Cancer in her paternal aunt and paternal grandmother; Depression in her brother, father, maternal grandmother, mother, paternal grandmother, and sister; Diabetes in her mother; Heart disease in her paternal grandfather; Hyperlipidemia in her mother; Hypertension in her mother; Learning disabilities in her brother and sister; Mental illness in her brother, father, mother, and sister; Miscarriages / Stillbirths in her paternal grandmother; Vision loss in her father, mother, and paternal grandmother..    Social History:  reports that she has never smoked. She has never used  smokeless tobacco. She reports that she does not currently use alcohol after a past usage of about 2.0 standard drinks of alcohol per week. She reports that she does not use drugs.    Review of patient's allergies indicates:   Allergen Reactions    Benadryl allergy-sinus Palpitations     tachycardia    Ciprofloxacin Hives, Shortness Of Breath and Other (See Comments)     hives    Diphenhydramine Shortness Of Breath, Palpitations and Other (See Comments)     Other Reaction(s): Other        Fish containing products Other (See Comments), Anaphylaxis, Hives and Shortness Of Breath     White fish    Any type of fish.    Latex, natural rubber Hives    Nickel Hives and Rash       Medications:   Prescriptions Prior to Admission[1]    Physical Exam:    Vital Signs: There were no vitals filed for this visit.    General Appearance: Well appearing in no acute distress  Abdomen: Soft, non tender, non distended with normal bowel sounds, no masses      Labs:  Lab Results   Component Value Date    WBC 10.63 05/23/2025    HGB 12.1 05/23/2025    HCT 37.5 05/23/2025     05/23/2025    CHOL 219 (H) 05/23/2025    TRIG 152 (H) 05/23/2025    HDL 36 (L) 05/23/2025    ALT 27 05/23/2025    AST 10 (L) 05/23/2025     05/23/2025    K 4.4 05/23/2025     05/23/2025    CREATININE 0.9 05/23/2025    BUN 10 05/23/2025    CO2 24 05/23/2025    TSH 1.201 05/23/2025    INR 1.0 10/03/2024    HGBA1C 5.4 05/23/2025       I have explained the risks and benefits of this endoscopic procedure to the patient including but not limited to bleeding, inflammation, infection, perforation, and death.    Assessment/Plan:     GERD   Dysphagia     - Proceed with EGD     Haily Elizalde MD  Gastroenterology   [facility]          [1]   Medications Prior to Admission   Medication Sig Dispense Refill Last Dose/Taking    albuterol (PROAIR HFA) 90 mcg/actuation inhaler Inhale 2 puffs into the lungs every 4 (four) hours as needed for Wheezing. Rescue  36 g 3     azelastine (ASTELIN) 137 mcg (0.1 %) nasal spray 2 sprays (274 mcg total) by Nasal route 2 (two) times daily. 30 mL 11     carvediloL (COREG) 6.25 MG tablet Take 1 tablet (6.25 mg total) by mouth 2 (two) times daily with meals. 180 tablet 3     clonazePAM (KLONOPIN) 1 MG tablet Take 1 tablet (1 mg total) by mouth every evening. 30 tablet 3     EPINEPHrine (EPIPEN 2-TRINITY) 0.3 mg/0.3 mL AtIn Inject 0.3 mLs (0.3 mg total) into the muscle once. for 1 dose 0.3 mL 6     esomeprazole (NEXIUM) 40 MG capsule Take 1 capsule (40 mg total) by mouth before breakfast. 90 capsule 3     famotidine (PEPCID) 20 MG tablet Take 1 tablet (20 mg total) by mouth every evening. 30 tablet 11     FLUoxetine 10 MG capsule Take 1 capsule (10 mg) once daily for one week prior to menstrual cycle for PMDD 30 capsule 0     hyoscyamine 0.125 mg Subl Place 1 tablet (0.125 mg total) under the tongue every 4 (four) hours as needed (abdominal cramping). 120 tablet 1     meloxicam (MOBIC) 15 MG tablet Take 1 tablet (15 mg total) by mouth once daily. 30 tablet 1     methocarbamoL (ROBAXIN) 500 MG Tab Take 1-3 tablets (500-1,500 mg total) by mouth 4 (four) times daily. 90 tablet 3     montelukast (SINGULAIR) 10 mg tablet Take 1 tablet (10 mg total) by mouth every evening. 90 tablet 3     omega-3s/dha/epa/algal oil (MEGARED ADVANCED OMEGA-3 ALGAE ORAL) Algae based omega 3.  IWi one po daily.       ondansetron (ZOFRAN) 8 MG tablet Take 1 tablet (8 mg total) by mouth every 8 (eight) hours as needed for Nausea. 30 tablet 1     telmisartan (MICARDIS) 80 MG Tab Take 1 tablet (80 mg total) by mouth once daily. 90 tablet 3     vilazodone (VIIBRYD) 10 mg Tab tablet Take 1 tablet (10 mg total) by mouth 2 (two) times a day. 60 tablet 2

## 2025-07-29 ENCOUNTER — CLINICAL SUPPORT (OUTPATIENT)
Dept: REHABILITATION | Facility: OTHER | Age: 45
End: 2025-07-29
Payer: OTHER GOVERNMENT

## 2025-07-29 DIAGNOSIS — R29.898 DECREASED STRENGTH OF TRUNK AND BACK: Primary | ICD-10-CM

## 2025-07-29 DIAGNOSIS — R68.89 DECREASED FUNCTIONAL ACTIVITY TOLERANCE: ICD-10-CM

## 2025-07-29 PROCEDURE — 97112 NEUROMUSCULAR REEDUCATION: CPT

## 2025-07-29 PROCEDURE — 97110 THERAPEUTIC EXERCISES: CPT

## 2025-07-29 NOTE — PROGRESS NOTES
"  Outpatient Rehab    Physical Therapy Visit    Patient Name: Olga Melchor  MRN: [id number]  YOB: 1980  Encounter Date: 7/29/2025    Therapy Diagnosis:   Encounter Diagnoses   Name Primary?    Decreased strength of trunk and back Yes    Decreased functional activity tolerance      Physician: ABNER Horvath NP    Physician Orders: Eval and Treat  Medical Diagnosis: Degeneration of intervertebral disc of lumbar region with discogenic back pain and lower extremity pain  Surgical Diagnosis: Not applicable for this Episode   Surgical Date: Not applicable for this Episode  Days Since Last Surgery: Not applicable for this Episode    Visit # / Visits Authorized:  9 / 20  Insurance Authorization Period: 6/24/2025 to 12/31/2025  Date of Evaluation: 6/24/2025  Plan of Care Certification: 6/25/2025 to 9/16/2025      PT/PTA: PT   Number of PTA visits since last PT visit:0  Time In: 0700   Time Out: 0758  Total Time (in minutes): 58   Total Billable Time (in minutes): 53    FOTO:  Intake Score (%): 53  Survey Score 2 (%): Not applicable for this Episode  Survey Score 3 (%): Not applicable for this Episode    Precautions:         Subjective   Pt reports some L side discomfort over the past few days.         Objective          Isometric Testing on Med X equipment: Testing administered by PT     Test Initial Midpoint Final   Spine area:Lumbar         Date 6/30/2025 7/29/2025     ROM 0-45 deg  0-48     Max Peak Torque 159   169     Min Peak Torque 38   37     Flex/Ext Ratio 4.18:1  4.5:1     % below normative data 27%  -15     % gain from initial test Not available visit 1  +5        Treatment:  Therapeutic Exercise  TE 1: NuStep 5'  TE 2: Prone on elbows 1' -- Prone press up x10  TE 3: METS for R posterior rotated innominate 5x5"  TE 4: Prone bilat hip extension x10 -- Hyper extension off edge of table x10  TE 8: Bridges, x15x5" GTB  TE 9: Cat/Camel x10 -- open book stretch x10  Balance/Neuromuscular " "Re-Education  NMR 3: TrA isometric + SLRx10  NMR 6: 90/90 isometric hold 30"x2      Therapeutic Exercise    Patient received therapeutic exercise with peripheral muscle strengthening which included one set of 15-20 repetitions at a slow and controlled 10-13 second per rep pace focused on strengthening supporting musculature in order to improve body mechanics and functional mobility. Patient and therapist focused on proper form during treatment to ensure optimal strengthening of each targeted muscle group.  Machines utilized included:Torso rotation, Leg Ext, Leg Curl, Chest Press, and RowingTriceps, Biceps, Hip Abd, and Leg Press     Balance/Neuromuscular Re-Education    MedX testing: Patient  received neuromuscular education to engage spinal musculature correctly for motor control and engagement of musculature for 10 minutes including the MedX exercise component and practice and standard testing. MedX dynamic exercise and baseline isometric test performed with instructions to guide the patient safely through the testing procedure. Patient instructed to perform isometric test correctly and safely while building to an optimal force with a pain-free effort. Patient also instructed that they should feel support/pressure from MedX restraints but no pain/discomfort, and encouraged to report any pain to therapist. Patient demonstrated appropriate understanding of information and tolerance of test.  Education regarding purpose of test, safety during test given, and reviewed possible more soreness and strategies.             7/29/2025     7:32 AM   [facility]   Visit Number 10   Time 5   Extension in Lying 10   Lumbar Flexion 48   Lumbar Extension 0   Lumbar Peak Torque 169 ft. lbs.   Min Torque 37   Test Percent Gain in Strength from Initial  5 %       Time Entry(in minutes):  Hot/Cold Pack Time Entry: 5  Neuromuscular Re-Education Time Entry: 15  Therapeutic Exercise Time Entry: 38    Assessment & Plan "   Assessment: Patient has attended 10 visits at [facility] which included MD evaluation, PT evaluation with isometric testing, and physical therapy treatment including HEP instruction, education, aerobic work, dynamic strengthening on med ex equipment for the spine, and whole body strengthening on med ex equipment with increasing weight loads.  Patient is demonstrating increased ability to reduce symptoms, improved posture, improved lumbar ROM, and improved lumbar strength on med ex test by 5% average.  Evaluation/Treatment Tolerance: Patient tolerated treatment well    The patient will continue to benefit from skilled outpatient physical therapy in order to address the deficits listed in the problem list on the initial evaluation, provide patient and family education, and maximize the patients level of independence in the home and community environments.     The patient's spiritual, cultural, and educational needs were considered, and the patient is agreeable to the plan of care and goals.           Plan: Continue with established Plan of Care towards established PT goals.    Goals:   Active       Long term goals: 10 weeks or 20 visits        Pt will demonstrate increased lumbar ROM by at least 6 degrees from initial ROM value, resulting in improved ability to perform functional forward bending while standing and sitting. (Ongoing)       Start:  06/24/25    Expected End:  09/16/25            Pt will demonstrate increased MedX average isometric strength value by 10% from MIDPOINT TEST resulting in improved ability to perform bending, lifting, and carrying activities safely and confidently. (Ongoing)       Start:  06/24/25    Expected End:  09/16/25            Pt to demonstrate ability to independently control and reduce their pain through posture positioning and mechanical movements throughout a typical day. (Ongoing)       Start:  06/24/25    Expected End:  09/16/25            Pt will demonstrate reduced  pain and improved functional outcomes as reported on the FOTO by reaching an intake score of >/= 59% functional ability in order to demonstrate subjective improvement in patient's condition.  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 (Ongoing)       Start:  06/24/25    Expected End:  09/16/25            Pt will demonstrate independence with the HEP at discharge. (Ongoing)       Start:  06/24/25    Expected End:  09/16/25               Short term goals:  6 weeks or 10 visits       Pt will demonstrate increased lumbar ROM by at least 3 degrees from the initial ROM value with improvements noted in functional ROM and ability to perform ADLs. (Met)       Start:  06/24/25    Expected End:  08/05/25    Resolved:  07/29/25         Pt will demonstrate increased MedX average isometric strength value by 15% from initial test resulting in improved ability to perform bending, lifting, and carrying activities safely, confidently. (Ongoing)       Start:  06/24/25    Expected End:  08/05/25            Pt will report a reduction in worst pain score by 1-2 points for improved tolerance for static sitting. (Met)       Start:  06/24/25    Expected End:  08/05/25    Resolved:  07/29/25         Pt able to perform HEP correctly with minimal cueing or supervision from therapist to encourage independent management of symptoms. (Met)       Start:  06/24/25    Expected End:  08/05/25    Resolved:  07/29/25             Maria Isabel Galloway, PT

## 2025-07-30 ENCOUNTER — OFFICE VISIT (OUTPATIENT)
Dept: DERMATOLOGY | Facility: CLINIC | Age: 45
End: 2025-07-30
Payer: OTHER GOVERNMENT

## 2025-07-30 ENCOUNTER — PATIENT MESSAGE (OUTPATIENT)
Dept: ORTHOPEDICS | Facility: CLINIC | Age: 45
End: 2025-07-30
Payer: OTHER GOVERNMENT

## 2025-07-30 DIAGNOSIS — L82.1 SK (SEBORRHEIC KERATOSIS): ICD-10-CM

## 2025-07-30 DIAGNOSIS — D22.9 MULTIPLE BENIGN NEVI: Primary | ICD-10-CM

## 2025-07-30 DIAGNOSIS — L91.8 ST (SKIN TAG): ICD-10-CM

## 2025-07-30 DIAGNOSIS — Z12.83 SKIN CANCER SCREENING: ICD-10-CM

## 2025-07-30 DIAGNOSIS — D17.20 LIPOMA OF LOWER EXTREMITY, UNSPECIFIED LATERALITY: ICD-10-CM

## 2025-07-30 DIAGNOSIS — D18.01 CHERRY ANGIOMA: ICD-10-CM

## 2025-07-30 PROCEDURE — 99214 OFFICE O/P EST MOD 30 MIN: CPT | Mod: PBBFAC,PN | Performed by: DERMATOLOGY

## 2025-07-30 PROCEDURE — 99999 PR PBB SHADOW E&M-EST. PATIENT-LVL IV: CPT | Mod: PBBFAC,,, | Performed by: DERMATOLOGY

## 2025-07-30 NOTE — PROGRESS NOTES
"Outpatient Psychiatry Follow-Up Visit (PA)    07/30/2025    Clinical Status of Patient:  Outpatient (Ambulatory)    Chief Complaint:  Shayna Melchor is a 45 y.o. female who presents today for follow-up of depression and anxiety.  Met with patient.     Current Medications:   Viibryd 10 mg BID  Prozac 10 mg 10 days prior to cycle  Clonazepam 1 mg qhs    Interval History and Content of Current Session:  Patient seen and chart reviewed. Last seen on 5/1/2025    Patient has a psychiatric history of: MDD, anxiety    Pt reports for follow up today after starting prozac 10 mg prior to hormonal cycle    Pt reports prozac prior to her cycle has given "significant relief". She is taking 10 days prior to hormonal cycle, has helped with mood, irritability, anger prior to cycle.     Her mood has been "pretty good overall". She reports improvement in interest, sadness, tearfulness.   Her anxiety has been "okay... definitely a lot better". She notes addition of prozac as helpful for anxiety as well.   She denies any recent panic attacks    She continues to endorse benefit with new job, "its good."    She is sleeping "okay." She continues to take clonazepam 1 mg qhs.   She will be starting zepbound soon    Appt has been fine    She remains splitting the viibryd dose for 10 mg qam and 10 mg qhs, reports ASE when taking 20 mg whole. She denies ASE with vybrid when taking 10 mg BID.  She reports benefit with prozac 10 mg prior to cycle and no ASE with addition.     She reports current medications are going well, no med adjustments necessary    No SI/HI/self harm      Psychotherapy:  Target symptoms: depression, anxiety   Why chosen therapy is appropriate versus another modality: relevant to diagnosis  Outcome monitoring methods: self-report, observation  Therapeutic intervention type: supportive psychotherapy  Topics discussed/themes: symptom recognition  The patient's response to the intervention is accepting. The patient's " "progress toward treatment goals is good.   Duration of intervention: 6 minutes.    Review of Systems   PSYCHIATRIC: Pertinant items are noted in the narrative.    Past Medication Trials:  trazodone, ambien, ativan, restoril, lunesta, belsomra, and remeron for sleep    Remeron- weight gain  Lexapro- increased anxiety  Zoloft- worked, then stopped  Wellbutirn- worsened anxiety  Cymbalta-   Pristiq-   Paxil- weight gain  Trazodone-   Ambien-   Lorazepam  Tenazepam  Lunesta  Belsomra  States "SNRI- "make me feel like I have the flu"      Past Medical, Family and Social History: The patient's past medical, family and social history have been reviewed and updated as appropriate within the electronic medical record - see encounter notes.    Compliance: yes    Side effects: None    Risk Parameters:  Patient reports no suicidal ideation  Patient reports no homicidal ideation  Patient reports no self-injurious behavior  Patient reports no violent behavior    Exam (detailed: at least 9 elements; comprehensive: all 15 elements)   Constitutional  Vitals:  Most recent vital signs, dated less than 90 days prior to this appointment, were reviewed.   Vitals:    07/31/25 0952   BP: 113/73   Pulse: 79   Weight: 111.6 kg (245 lb 15.1 oz)        General:  unremarkable, age appropriate, casually dressed, neatly groomed     Musculoskeletal  Muscle Strength/Tone:  not examined   Gait & Station:  non-ataxic     Psychiatric  Speech:  no latency; no press   Mood & Affect:  steady  congruent and appropriate   Thought Process:  normal and logical   Associations:  intact   Thought Content:  normal, no suicidality, no homicidality, delusions, or paranoia   Insight:  intact   Judgement: behavior is adequate to circumstances   Orientation:  grossly intact   Memory: intact for content of interview   Language: grossly intact   Attention Span & Concentration:  able to focus   Fund of Knowledge:  intact and appropriate to age and level of education "     Assessment and Diagnosis   Status/Progress: Based on the examination today, the patient's problem(s) is/are improved.  New problems have not been presented today.   Lack of compliance are not complicating management of the primary condition.  There are no active rule-out diagnoses for this patient at this time.     General Impression: Patient is a 45 year old female with a psychiatric history of recurrent, moderate MDD, JULIET.  Patient reports benefit with current medications, viibryd 10 mg BID, and clonazepam 1 mg qhs. She reports less ASE with viibryd 10 mg BID vs 20 mg daily. Today, patient reports significant improvement in mood/anxiety prior to hormonal cycle with addition of prozac 10 mg prior to cycle. No ASE with additional medication.     Pt is stable, doing well.       ICD-10-CM ICD-9-CM    1. Premenstrual dysphoria  F32.81 625.4       2. Recurrent major depressive disorder, in partial remission  F33.41 296.35       3. Insomnia, unspecified type  G47.00 780.52 clonazePAM (KLONOPIN) 1 MG tablet      4. Generalized anxiety disorder  F41.1 300.02 clonazePAM (KLONOPIN) 1 MG tablet                  Intervention/Counseling/Treatment Plan   Medication Management: Continue current medications. The risks and benefits of medication were discussed with the patient.  continue prozac 10 mg 7-10 days prior to menstrual cycle (when would be- post hysterectomy)  Continue viibryd 10 mg BID mg daily  Continue clonazepam 1 mg qhs  Informed pt of the risks of continuous Benzodiazepine use including tolerance, dependence and withdrawals that may be life threatening upon abrupt cessation. Also advised not to take Benzodiazepines with Opiates or other sedatives and also not to drive or operate heavy machinery while using Benzodiazepines.   Discussed diagnosis, risk and benefits of proposed treatment above vs alternative treatment vs no treatment, and potential side effects of these treatments, and the inherent unpredictability  of individual responses to these treatments. The patient expresses understanding and gives informed consent to pursue treatment at this time, believing that the potential benefits outweigh the potential risks. Patient has no other questions. Risks/adverse effects at this time include but are not limited to: GI side effects, sexual dysfunction, activation vs sedation, triggering of suicidal ideation, and serotonin syndrome.   Patient voices understanding and agreement with this plan  Encouraged patient to keep future appointments  Instruct patient to call or message with questions  In the event of an emergency, including suicidal ideation, patient was advised to go to the emergency room      Return to Clinic: 3 months    Amber Barbosa PA-C

## 2025-07-30 NOTE — PROGRESS NOTES
Subjective:      Patient ID:  Shayna Melchor is a 45 y.o. female who presents for   Chief Complaint   Patient presents with    Skin Check     HPI    Pt here today for a TBSE.   Pt c/o painful red lesion under L breast x a few months. No prev tx.    Pt c/o bump on right buttock x a few years. No bleeding, pain or prev tx.     No prior hx of skin cancer.    Review of Systems   Skin:  Positive for activity-related sunscreen use. Negative for daily sunscreen use and recent sunburn.   Hematologic/Lymphatic: Does not bruise/bleed easily.       Objective:   Physical Exam   Constitutional: She appears well-developed and well-nourished. No distress.   Neurological: She is alert and oriented to person, place, and time. She is not disoriented.   Psychiatric: She has a normal mood and affect.   Skin:   Areas Examined (abnormalities noted in diagram):   Scalp / Hair Palpated and Inspected  Head / Face Inspection Performed  Neck Inspection Performed  Chest / Axilla Inspection Performed  Abdomen Inspection Performed  Genitals / Buttocks / Groin Inspection Performed  Back Inspection Performed  RUE Inspected  LUE Inspection Performed  RLE Inspected  LLE Inspection Performed  Nails and Digits Inspection Performed                 Diagram Legend     Erythematous scaling macule/papule c/w actinic keratosis       Vascular papule c/w angioma      Pigmented verrucoid papule/plaque c/w seborrheic keratosis      Yellow umbilicated papule c/w sebaceous hyperplasia      Irregularly shaped tan macule c/w lentigo     1-2 mm smooth white papules consistent with Milia      Movable subcutaneous cyst with punctum c/w epidermal inclusion cyst      Subcutaneous movable cyst c/w pilar cyst      Firm pink to brown papule c/w dermatofibroma      Pedunculated fleshy papule(s) c/w skin tag(s)      Evenly pigmented macule c/w junctional nevus     Mildly variegated pigmented, slightly irregular-bordered macule c/w mildly atypical nevus      Flesh  colored to evenly pigmented papule c/w intradermal nevus       Pink pearly papule/plaque c/w basal cell carcinoma      Erythematous hyperkeratotic cursted plaque c/w SCC      Surgical scar with no sign of skin cancer recurrence      Open and closed comedones      Inflammatory papules and pustules      Verrucoid papule consistent consistent with wart     Erythematous eczematous patches and plaques     Dystrophic onycholytic nail with subungual debris c/w onychomycosis     Umbilicated papule    Erythematous-base heme-crusted tan verrucoid plaque consistent with inflamed seborrheic keratosis     Erythematous Silvery Scaling Plaque c/w Psoriasis     See annotation      Assessment / Plan:        Multiple benign nevi  Benign-appearing nevi present on exam today. Reassurance provided. Periodically examine moles and return to clinic if any moles change or become symptomatic (bleeding, itching, pain, etc).    SK (seborrheic keratosis)  These are benign inherited growths without a malignant potential. Reassurance given to patient. No treatment is necessary.   Treatment of benign, asymptomatic lesions may be considered cosmetic.  Warned about risk of hypo- or hyperpigmentation with treatment and risk of recurrence.    Cherry angioma  This is a benign vascular lesion. Reassurance given. No treatment required. Treatment of benign, asymptomatic lesions may be considered cosmetic.    ST (skin tag)  Reassurance given to patient. No treatment is necessary.   Treatment of benign, asymptomatic lesions may be considered cosmetic.    Lipoma of lower extremity, unspecified laterality  Counseled pt that this is likely a benign collection of fatty tissue, but would recommend an excisional biopsy if lesion grows or becomes symptomatic.    Skin cancer screening  Total body skin examination performed today including at least 12 points as noted in physical examination. No lesions suspicious for malignancy noted.  Patient instructed in  importance of daily broad spectrum sunscreen use with spf at least 30. Sun avoidance and topical protection/protective clothing discussed.    POWER WAS OUT IN CLINIC AND INTERNET UNAVAILABLE.  PT MENTIONED PINK, FLUSHING ON CHEEKS. UNABLE TO PRESCRIBE MEDS TODAY. MAY REVISIT IN FUTURE.    Follow up in about 1 year (around 7/30/2026) for skin check or sooner for any concerns.

## 2025-07-31 ENCOUNTER — CLINICAL SUPPORT (OUTPATIENT)
Dept: REHABILITATION | Facility: OTHER | Age: 45
End: 2025-07-31
Payer: OTHER GOVERNMENT

## 2025-07-31 ENCOUNTER — OFFICE VISIT (OUTPATIENT)
Dept: PSYCHIATRY | Facility: CLINIC | Age: 45
End: 2025-07-31
Payer: OTHER GOVERNMENT

## 2025-07-31 VITALS
WEIGHT: 245.94 LBS | BODY MASS INDEX: 40.93 KG/M2 | SYSTOLIC BLOOD PRESSURE: 113 MMHG | HEART RATE: 79 BPM | DIASTOLIC BLOOD PRESSURE: 73 MMHG

## 2025-07-31 DIAGNOSIS — G47.00 INSOMNIA, UNSPECIFIED TYPE: ICD-10-CM

## 2025-07-31 DIAGNOSIS — F32.81 PREMENSTRUAL DYSPHORIA: Primary | ICD-10-CM

## 2025-07-31 DIAGNOSIS — R29.898 DECREASED STRENGTH OF TRUNK AND BACK: Primary | ICD-10-CM

## 2025-07-31 DIAGNOSIS — R68.89 DECREASED FUNCTIONAL ACTIVITY TOLERANCE: ICD-10-CM

## 2025-07-31 DIAGNOSIS — F41.1 GENERALIZED ANXIETY DISORDER: ICD-10-CM

## 2025-07-31 DIAGNOSIS — F33.41 RECURRENT MAJOR DEPRESSIVE DISORDER, IN PARTIAL REMISSION: ICD-10-CM

## 2025-07-31 LAB
ESTRIOL SERPL-MCNC: NORMAL NG/ML
ESTROGEN SERPL-MCNC: NORMAL PG/ML
INSULIN SERPL-ACNC: NORMAL U[IU]/ML
LAB AP CLINICAL INFORMATION: NORMAL
LAB AP GROSS DESCRIPTION: NORMAL
LAB AP PERFORMING LOCATION(S): NORMAL
LAB AP REPORT FOOTNOTES: NORMAL

## 2025-07-31 PROCEDURE — 99213 OFFICE O/P EST LOW 20 MIN: CPT | Mod: PBBFAC | Performed by: PHYSICIAN ASSISTANT

## 2025-07-31 PROCEDURE — 97110 THERAPEUTIC EXERCISES: CPT

## 2025-07-31 PROCEDURE — 97530 THERAPEUTIC ACTIVITIES: CPT

## 2025-07-31 PROCEDURE — 99999 PR PBB SHADOW E&M-EST. PATIENT-LVL III: CPT | Mod: PBBFAC,,, | Performed by: PHYSICIAN ASSISTANT

## 2025-07-31 PROCEDURE — 97112 NEUROMUSCULAR REEDUCATION: CPT

## 2025-07-31 RX ORDER — VILAZODONE HYDROCHLORIDE 10 MG/1
10 TABLET ORAL 2 TIMES DAILY
Qty: 60 TABLET | Refills: 2 | Status: SHIPPED | OUTPATIENT
Start: 2025-07-31 | End: 2025-10-29

## 2025-07-31 RX ORDER — CLONAZEPAM 1 MG/1
1 TABLET ORAL NIGHTLY
Qty: 30 TABLET | Refills: 3 | Status: SHIPPED | OUTPATIENT
Start: 2025-08-29

## 2025-07-31 RX ORDER — FLUOXETINE 10 MG/1
CAPSULE ORAL
Qty: 30 CAPSULE | Refills: 0 | Status: SHIPPED | OUTPATIENT
Start: 2025-07-31

## 2025-07-31 NOTE — PATIENT INSTRUCTIONS

## 2025-07-31 NOTE — PROGRESS NOTES
Outpatient Rehab    Physical Therapy Visit    Patient Name: Olga Melchor  MRN: [id number]  YOB: 1980  Encounter Date: 7/31/2025    Therapy Diagnosis:   Encounter Diagnoses   Name Primary?    Decreased strength of trunk and back Yes    Decreased functional activity tolerance      Physician: ABNER Horvath NP    Physician Orders: Eval and Treat  Medical Diagnosis: Degeneration of intervertebral disc of lumbar region with discogenic back pain and lower extremity pain  Surgical Diagnosis: Not applicable for this Episode   Surgical Date: Not applicable for this Episode  Days Since Last Surgery: Not applicable for this Episode    Visit # / Visits Authorized:  10 / 20 + eval  Insurance Authorization Period: 6/24/2025 to 12/31/2025  Date of Evaluation: 6/24/2025  Plan of Care Certification: 6/25/2025 to 9/16/2025      PT/PTA: PT   Number of PTA visits since last PT visit:0  Time In: 0700   Time Out: 0758  Total Time (in minutes): 58   Total Billable Time (in minutes): 53    FOTO:  Intake Score (%): 53  Survey Score 2 (%): Not applicable for this Episode  Survey Score 3 (%): Not applicable for this Episode    Precautions:         Subjective   Pt continues to report deep ache on the L side of the back and at the end of the day will have sorenesss at the upper back as well. She did confirm with her Ortho she should be having the lift on the L side to bring that side up to the R..  Pain reported as 2/10.      Objective          Isometric Testing on Med X equipment: Testing administered by PT     Test Initial Midpoint Final   Spine area:Lumbar         Date 6/30/2025 7/29/2025     ROM 0-45 deg  0-48     Max Peak Torque 159   169     Min Peak Torque 38   37     Flex/Ext Ratio 4.18:1  4.5:1     % below normative data 27%  -15     % gain from initial test Not available visit 1  +5        Treatment:  Therapeutic Exercise  TE 1: NuStep 5'  TE 2: Prone on elbows 1' -- Prone press up x10  TE 9: Cat/Camel x10 -- open  book stretch x10  TE 10: Standing QL hip hike x10  Balance/Neuromuscular Re-Education  NMR 4: Pallof press GTB x12    Lifting mechanics and education  Handout review  Proper lifting techniques  Neutral spine and hip hinge  Neutral spine with hip hinge and knee bend  Sit to stand with dowel  Lifting demonstration with crate and 10#:    (Modified) Floor lift x5   Hip hinge x5   Golfer lift x5 bilat no weights    Therapeutic Exercise    Patient received therapeutic exercise with peripheral muscle strengthening which included one set of 15-20 repetitions at a slow and controlled 10-13 second per rep pace focused on strengthening supporting musculature in order to improve body mechanics and functional mobility. Patient and therapist focused on proper form during treatment to ensure optimal strengthening of each targeted muscle group.  Machines utilized included:Torso rotation, Leg Ext, Leg Curl, Chest Press, and RowingTriceps, Biceps, Hip Abd, and Leg Press     Balance/Neuromuscular Re-Education    MedX exercise :  Patient received neuromuscular education for 8 minutes via participation on the Medical MedX Machine. Therapist assisted patient in isolating and engaging spinal stabilization musculature in order to improve functional ability and postural control. Patient performed exercise with therapist guidance in order to accurately use pacer function, avoid valsalva, and optimally exert effort within a safe and effective range via the Brigitte Exertion Rating Scale. Patient instructed to perform at a midrange of exertion and to complete 15-20 repetitions within appropriate split time, with proper technique, and while maintaining safety.             7/31/2025     8:10 AM   [facility]   Visit Number 11   Time 5   Extension in Lying 10   Lumbar Weight 78 lbs   Repetitions 15   Rating of Perceived Exertion 7   Ice - Z Lie (in min.) 5         Time Entry(in minutes):  Hot/Cold Pack Time Entry: 5  Neuromuscular  Re-Education Time Entry: 8  Therapeutic Activity Time Entry: 10  Therapeutic Exercise Time Entry: 35    Assessment & Plan   Assessment: Merly presents to healthy back visit reporting ashiness on the L side of the back. Continued with daily stretches. Completed education and demonstrating on proper lifting mechanics. Pt had the most difficulty with balancing on the RLE in the golfers lift. Added hip hike today in standing with a difference felt when balancing on the R sie. Pt education on how the L side of the back stablizes for the R side of the body in single leg standing and she was given HEP for unilateral strengthening. Lumbar medx strengthening at 78 ft lbs 15 repetitions and 7/10 RPE. Pt was also able to complete all of the peripheral strengthening exercises without increased discomfort and will continue to complete the full circuit as tolerated.    Evaluation/Treatment Tolerance: Patient tolerated treatment well    The patient will continue to benefit from skilled outpatient physical therapy in order to address the deficits listed in the problem list on the initial evaluation, provide patient and family education, and maximize the patients level of independence in the home and community environments.     The patient's spiritual, cultural, and educational needs were considered, and the patient is agreeable to the plan of care and goals.           Plan: Continue with established Plan of Care towards established PT goals.    Goals:   Active       Long term goals: 10 weeks or 20 visits        Pt will demonstrate increased lumbar ROM by at least 6 degrees from initial ROM value, resulting in improved ability to perform functional forward bending while standing and sitting. (Ongoing)       Start:  06/24/25    Expected End:  09/16/25            Pt will demonstrate increased MedX average isometric strength value by 10% from MIDPOINT TEST resulting in improved ability to perform bending, lifting, and carrying  activities safely and confidently. (Ongoing)       Start:  06/24/25    Expected End:  09/16/25            Pt to demonstrate ability to independently control and reduce their pain through posture positioning and mechanical movements throughout a typical day. (Ongoing)       Start:  06/24/25    Expected End:  09/16/25            Pt will demonstrate reduced pain and improved functional outcomes as reported on the FOTO by reaching an intake score of >/= 59% functional ability in order to demonstrate subjective improvement in patient's condition.  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 (Ongoing)       Start:  06/24/25    Expected End:  09/16/25            Pt will demonstrate independence with the HEP at discharge. (Ongoing)       Start:  06/24/25    Expected End:  09/16/25               Short term goals:  6 weeks or 10 visits       Pt will demonstrate increased lumbar ROM by at least 3 degrees from the initial ROM value with improvements noted in functional ROM and ability to perform ADLs. (Met)       Start:  06/24/25    Expected End:  08/05/25    Resolved:  07/29/25         Pt will demonstrate increased MedX average isometric strength value by 15% from initial test resulting in improved ability to perform bending, lifting, and carrying activities safely, confidently. (Ongoing)       Start:  06/24/25    Expected End:  08/05/25            Pt will report a reduction in worst pain score by 1-2 points for improved tolerance for static sitting. (Met)       Start:  06/24/25    Expected End:  08/05/25    Resolved:  07/29/25         Pt able to perform HEP correctly with minimal cueing or supervision from therapist to encourage independent management of symptoms. (Met)       Start:   06/24/25    Expected End:  08/05/25    Resolved:  07/29/25             Maria Isabel Galloway PT

## 2025-08-05 ENCOUNTER — CLINICAL SUPPORT (OUTPATIENT)
Dept: REHABILITATION | Facility: OTHER | Age: 45
End: 2025-08-05
Payer: OTHER GOVERNMENT

## 2025-08-05 DIAGNOSIS — R29.898 DECREASED STRENGTH OF TRUNK AND BACK: Primary | ICD-10-CM

## 2025-08-05 DIAGNOSIS — R68.89 DECREASED FUNCTIONAL ACTIVITY TOLERANCE: ICD-10-CM

## 2025-08-05 PROCEDURE — 97112 NEUROMUSCULAR REEDUCATION: CPT

## 2025-08-05 PROCEDURE — 97110 THERAPEUTIC EXERCISES: CPT

## 2025-08-05 NOTE — PROGRESS NOTES
Outpatient Rehab    Physical Therapy Visit    Patient Name: Olga Melchor  MRN: [id number]  YOB: 1980  Encounter Date: 8/5/2025    Therapy Diagnosis:   Encounter Diagnoses   Name Primary?    Decreased strength of trunk and back Yes    Decreased functional activity tolerance      Physician: ABNER Horvath NP    Physician Orders: Eval and Treat  Medical Diagnosis: Degeneration of intervertebral disc of lumbar region with discogenic back pain and lower extremity pain  Surgical Diagnosis: Not applicable for this Episode   Surgical Date: Not applicable for this Episode  Days Since Last Surgery: Not applicable for this Episode    Visit # / Visits Authorized:  11 / 20 + eval  Insurance Authorization Period: 6/24/2025 to 12/31/2025  Date of Evaluation: 6/24/2025  Plan of Care Certification: 6/25/2025 to 9/16/2025      PT/PTA: PT   Number of PTA visits since last PT visit:0  Time In: 0701   Time Out: 0800  Total Time (in minutes): 59   Total Billable Time (in minutes): 54    FOTO:  Intake Score (%): 53  Survey Score 2 (%): Not applicable for this Episode  Survey Score 3 (%): Not applicable for this Episode    Precautions:         Subjective   Pt reports her side doesn't typically bother her over the weekend, only during the week when working. She tried the new exercises and was off balance with single leg exercises. Olga reports being able to  line for one hour over the weekend without incrased low back pain..  Pain reported as 2/10.      Objective          Isometric Testing on Med X equipment: Testing administered by PT     Test Initial Midpoint Final   Spine area:Lumbar         Date 6/30/2025 7/29/2025     ROM 0-45 deg  0-48     Max Peak Torque 159   169     Min Peak Torque 38   37     Flex/Ext Ratio 4.18:1  4.5:1     % below normative data 27%  -15     % gain from initial test Not available visit 1  +5        Treatment:  Therapeutic Exercise  TE 1: NuStep 5'  TE 2: Prone on elbows 1' -- Prone  "press up x10  TE 4: Hyper extension off edge of table x10  TE 6: side lying clams RTB 3'x12  TE 9: Cat/Camel x10 -- open book stretch x10  TE 10: Standing QL hip hike x12  Balance/Neuromuscular Re-Education  NMR 4: Pallof press GTB x12 -- split stance pallof press GTB x12  NMR 5: Bird/Dog x10 not alternating  NMR 7: Modified side plank 2x20"      Therapeutic Exercise    Patient received therapeutic exercise with peripheral muscle strengthening which included one set of 15-20 repetitions at a slow and controlled 10-13 second per rep pace focused on strengthening supporting musculature in order to improve body mechanics and functional mobility. Patient and therapist focused on proper form during treatment to ensure optimal strengthening of each targeted muscle group.  Machines utilized included:Torso rotation, Leg Ext, Leg Curl, Chest Press, and RowingTriceps, Biceps, Hip Abd, and Leg Press     Balance/Neuromuscular Re-Education    MedX exercise :  Patient received neuromuscular education for 8 minutes via participation on the Medical MedX Machine. Therapist assisted patient in isolating and engaging spinal stabilization musculature in order to improve functional ability and postural control. Patient performed exercise with therapist guidance in order to accurately use pacer function, avoid valsalva, and optimally exert effort within a safe and effective range via the Brigitte Exertion Rating Scale. Patient instructed to perform at a midrange of exertion and to complete 15-20 repetitions within appropriate split time, with proper technique, and while maintaining safety.             8/5/2025     7:47 AM   [facility]   Visit Number 12   VAS Pain Rating 2   Time 5   Extension in Lying 10   Lumbar Weight 78 lbs   Repetitions 18   Rating of Perceived Exertion 7   Ice - Z Lie (in min.) 5           Time Entry(in minutes):  Hot/Cold Pack Time Entry: 5  Neuromuscular Re-Education Time Entry: 15  Therapeutic Exercise Time " Entry: 39    Assessment & Plan   Assessment: Olga presents to healthy back visit reporting less discomfort on the L side of the back and usually feeling better over the weekend. Continued with daily stretches and strengthening exercises to promote stability and strength in the lumbar spine. Added unilateral exercises this visit to promote stability. Lumbar medx strengthening at 78 ft lbs 18 repetitions and 7/10 RPE. Pt was also able to complete all of the peripheral strengthening exercises without increased discomfort and will continue to complete the full circuit as tolerated.    Evaluation/Treatment Tolerance: Patient tolerated treatment well    The patient will continue to benefit from skilled outpatient physical therapy in order to address the deficits listed in the problem list on the initial evaluation, provide patient and family education, and maximize the patients level of independence in the home and community environments.     The patient's spiritual, cultural, and educational needs were considered, and the patient is agreeable to the plan of care and goals.           Plan: Continue with established Plan of Care towards established PT goals.    Goals:   Active       Long term goals: 10 weeks or 20 visits        Pt will demonstrate increased lumbar ROM by at least 6 degrees from initial ROM value, resulting in improved ability to perform functional forward bending while standing and sitting. (Ongoing)       Start:  06/24/25    Expected End:  09/16/25            Pt will demonstrate increased MedX average isometric strength value by 10% from MIDPOINT TEST resulting in improved ability to perform bending, lifting, and carrying activities safely and confidently. (Ongoing)       Start:  06/24/25    Expected End:  09/16/25            Pt to demonstrate ability to independently control and reduce their pain through posture positioning and mechanical movements throughout a typical day. (Ongoing)       Start:   06/24/25    Expected End:  09/16/25            Pt will demonstrate reduced pain and improved functional outcomes as reported on the FOTO by reaching an intake score of >/= 59% functional ability in order to demonstrate subjective improvement in patient's condition.  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 (Ongoing)       Start:  06/24/25    Expected End:  09/16/25            Pt will demonstrate independence with the HEP at discharge. (Ongoing)       Start:  06/24/25    Expected End:  09/16/25               Short term goals:  6 weeks or 10 visits       Pt will demonstrate increased lumbar ROM by at least 3 degrees from the initial ROM value with improvements noted in functional ROM and ability to perform ADLs. (Met)       Start:  06/24/25    Expected End:  08/05/25    Resolved:  07/29/25         Pt will demonstrate increased MedX average isometric strength value by 15% from initial test resulting in improved ability to perform bending, lifting, and carrying activities safely, confidently. (Ongoing)       Start:  06/24/25    Expected End:  08/05/25            Pt will report a reduction in worst pain score by 1-2 points for improved tolerance for static sitting. (Met)       Start:  06/24/25    Expected End:  08/05/25    Resolved:  07/29/25         Pt able to perform HEP correctly with minimal cueing or supervision from therapist to encourage independent management of symptoms. (Met)       Start:  06/24/25    Expected End:  08/05/25    Resolved:  07/29/25             Maria Isabel Galloway, PT

## 2025-08-07 ENCOUNTER — CLINICAL SUPPORT (OUTPATIENT)
Dept: REHABILITATION | Facility: OTHER | Age: 45
End: 2025-08-07
Payer: OTHER GOVERNMENT

## 2025-08-07 DIAGNOSIS — R29.898 DECREASED STRENGTH OF TRUNK AND BACK: Primary | ICD-10-CM

## 2025-08-07 DIAGNOSIS — R68.89 DECREASED FUNCTIONAL ACTIVITY TOLERANCE: ICD-10-CM

## 2025-08-07 PROCEDURE — 97112 NEUROMUSCULAR REEDUCATION: CPT

## 2025-08-07 PROCEDURE — 97110 THERAPEUTIC EXERCISES: CPT

## 2025-08-07 NOTE — PROGRESS NOTES
Outpatient Rehab    Physical Therapy Visit    Patient Name: Olga Melchor  MRN: [id number]  YOB: 1980  Encounter Date: 8/7/2025    Therapy Diagnosis:   Encounter Diagnoses   Name Primary?    Decreased strength of trunk and back Yes    Decreased functional activity tolerance      Physician: ABNER Horvath NP    Physician Orders: Eval and Treat  Medical Diagnosis: Degeneration of intervertebral disc of lumbar region with discogenic back pain and lower extremity pain  Surgical Diagnosis: Not applicable for this Episode   Surgical Date: Not applicable for this Episode  Days Since Last Surgery: Not applicable for this Episode    Visit # / Visits Authorized:  12 / 20 + eval  Insurance Authorization Period: 6/24/2025 to 12/31/2025  Date of Evaluation: 6/24/2025  Plan of Care Certification: 6/25/2025 to 9/16/2025      PT/PTA: PT   Number of PTA visits since last PT visit:0  Time In: 0700   Time Out:    Total Time (in minutes):     Total Billable Time (in minutes):      FOTO:  Intake Score (%): 53  Survey Score 2 (%): Not applicable for this Episode  Survey Score 3 (%): Not applicable for this Episode    Precautions:         Subjective   Olga reports feeling a little better on the L side and feels like the clams exercise helped. She has a step at home and that helped with the single leg hip hiking. there is some disocmfort in the R knee today that can happen periodically with crepitus..  Pain reported as 1/10.      Objective          Isometric Testing on Med X equipment: Testing administered by PT     Test Initial Midpoint Final   Spine area:Lumbar         Date 6/30/2025 7/29/2025     ROM 0-45 deg  0-48     Max Peak Torque 159   169     Min Peak Torque 38   37     Flex/Ext Ratio 4.18:1  4.5:1     % below normative data 27%  -15     % gain from initial test Not available visit 1  +5        Treatment:  Therapeutic Exercise  TE 1: NuStep 5'  TE 2: Prone on elbows 1' -- Prone press up x10  TE 4: Hyper extension  "off edge of table x10  TE 6: side lying clams RTB 3'x12  TE 9: Cat/Camel x10 -- open book stretch x10  TE 10: Standing single leg QL hip hike x12  Balance/Neuromuscular Re-Education  NMR 4: split stance pallof press GTB x12  NMR 5: Bird/Dog x10  NMR 7: Modified side plank 2x20"      Therapeutic Exercise    Patient received therapeutic exercise with peripheral muscle strengthening which included one set of 15-20 repetitions at a slow and controlled 10-13 second per rep pace focused on strengthening supporting musculature in order to improve body mechanics and functional mobility. Patient and therapist focused on proper form during treatment to ensure optimal strengthening of each targeted muscle group.  Machines utilized included:Torso rotation, Leg Ext, Leg Curl, Chest Press, and RowingTriceps, Biceps, Hip Abd, and Leg Press     Balance/Neuromuscular Re-Education    MedX exercise :  Patient received neuromuscular education for 8 minutes via participation on the Medical MedX Machine. Therapist assisted patient in isolating and engaging spinal stabilization musculature in order to improve functional ability and postural control. Patient performed exercise with therapist guidance in order to accurately use pacer function, avoid valsalva, and optimally exert effort within a safe and effective range via the Brigitte Exertion Rating Scale. Patient instructed to perform at a midrange of exertion and to complete 15-20 repetitions within appropriate split time, with proper technique, and while maintaining safety.             8/7/2025     7:38 AM   [facility]   Visit Number 13   VAS Pain Rating 1   Time 5   Extension in Lying 10   Lumbar Weight 78 lbs   Repetitions 18   Rating of Perceived Exertion 7   Ice - Z Lie (in min.) 5         Time Entry(in minutes):       Assessment & Plan   Assessment: Olag presents to healthy back visit reporting less discomfort on the L side of the back and the new exercises are helping. " Continued with daily stretches and strengthening exercises to promote stability and strength in the lumbar spine. Continued unilateral exercises this visit to promote stability. Lumbar medx strengthening at 78 ft lbs 18 repetitions and 7/10 RPE, she required a rest break and was not able to get to 20 repetitions, will continue to maintain resistance. Pt was also able to complete all of the peripheral strengthening exercises without increased discomfort and will continue to complete the full circuit as tolerated.    Evaluation/Treatment Tolerance: Patient tolerated treatment well    The patient will continue to benefit from skilled outpatient physical therapy in order to address the deficits listed in the problem list on the initial evaluation, provide patient and family education, and maximize the patients level of independence in the home and community environments.     The patient's spiritual, cultural, and educational needs were considered, and the patient is agreeable to the plan of care and goals.           Plan: Continue with established Plan of Care towards established PT goals.    Goals:   Active       Long term goals: 10 weeks or 20 visits        Pt will demonstrate increased lumbar ROM by at least 6 degrees from initial ROM value, resulting in improved ability to perform functional forward bending while standing and sitting. (Ongoing)       Start:  06/24/25    Expected End:  09/16/25            Pt will demonstrate increased MedX average isometric strength value by 10% from MIDPOINT TEST resulting in improved ability to perform bending, lifting, and carrying activities safely and confidently. (Ongoing)       Start:  06/24/25    Expected End:  09/16/25            Pt to demonstrate ability to independently control and reduce their pain through posture positioning and mechanical movements throughout a typical day. (Ongoing)       Start:  06/24/25    Expected End:  09/16/25            Pt will demonstrate  reduced pain and improved functional outcomes as reported on the FOTO by reaching an intake score of >/= 59% functional ability in order to demonstrate subjective improvement in patient's condition.  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 (Ongoing)       Start:  06/24/25    Expected End:  09/16/25            Pt will demonstrate independence with the HEP at discharge. (Ongoing)       Start:  06/24/25    Expected End:  09/16/25               Short term goals:  6 weeks or 10 visits       Pt will demonstrate increased lumbar ROM by at least 3 degrees from the initial ROM value with improvements noted in functional ROM and ability to perform ADLs. (Met)       Start:  06/24/25    Expected End:  08/05/25    Resolved:  07/29/25         Pt will demonstrate increased MedX average isometric strength value by 15% from initial test resulting in improved ability to perform bending, lifting, and carrying activities safely, confidently. (Ongoing)       Start:  06/24/25    Expected End:  08/05/25            Pt will report a reduction in worst pain score by 1-2 points for improved tolerance for static sitting. (Met)       Start:  06/24/25    Expected End:  08/05/25    Resolved:  07/29/25         Pt able to perform HEP correctly with minimal cueing or supervision from therapist to encourage independent management of symptoms. (Met)       Start:  06/24/25    Expected End:  08/05/25    Resolved:  07/29/25             Maria Isabel Galloway, PT

## 2025-08-12 ENCOUNTER — CLINICAL SUPPORT (OUTPATIENT)
Dept: REHABILITATION | Facility: OTHER | Age: 45
End: 2025-08-12
Payer: OTHER GOVERNMENT

## 2025-08-12 DIAGNOSIS — R68.89 DECREASED FUNCTIONAL ACTIVITY TOLERANCE: ICD-10-CM

## 2025-08-12 DIAGNOSIS — R29.898 DECREASED STRENGTH OF TRUNK AND BACK: Primary | ICD-10-CM

## 2025-08-12 PROCEDURE — 97110 THERAPEUTIC EXERCISES: CPT

## 2025-08-12 PROCEDURE — 97112 NEUROMUSCULAR REEDUCATION: CPT

## 2025-08-13 DIAGNOSIS — I10 PRIMARY HYPERTENSION: ICD-10-CM

## 2025-08-13 RX ORDER — TELMISARTAN 80 MG/1
TABLET ORAL
Qty: 90 TABLET | Refills: 3 | Status: SHIPPED | OUTPATIENT
Start: 2025-08-13

## 2025-08-16 DIAGNOSIS — M51.362 DEGENERATION OF INTERVERTEBRAL DISC OF LUMBAR REGION WITH DISCOGENIC BACK PAIN AND LOWER EXTREMITY PAIN: ICD-10-CM

## 2025-08-19 RX ORDER — METHOCARBAMOL 500 MG/1
500-1500 TABLET, FILM COATED ORAL 4 TIMES DAILY
Qty: 90 TABLET | Refills: 3 | Status: SHIPPED | OUTPATIENT
Start: 2025-08-19

## 2025-08-21 ENCOUNTER — CLINICAL SUPPORT (OUTPATIENT)
Dept: REHABILITATION | Facility: OTHER | Age: 45
End: 2025-08-21
Payer: OTHER GOVERNMENT

## 2025-08-21 DIAGNOSIS — R29.898 DECREASED STRENGTH OF TRUNK AND BACK: Primary | ICD-10-CM

## 2025-08-21 DIAGNOSIS — R68.89 DECREASED FUNCTIONAL ACTIVITY TOLERANCE: ICD-10-CM

## 2025-08-21 PROCEDURE — 97112 NEUROMUSCULAR REEDUCATION: CPT

## 2025-08-21 PROCEDURE — 97110 THERAPEUTIC EXERCISES: CPT

## 2025-08-26 ENCOUNTER — CLINICAL SUPPORT (OUTPATIENT)
Dept: REHABILITATION | Facility: OTHER | Age: 45
End: 2025-08-26
Payer: OTHER GOVERNMENT

## 2025-08-26 DIAGNOSIS — R29.898 DECREASED STRENGTH OF TRUNK AND BACK: Primary | ICD-10-CM

## 2025-08-26 DIAGNOSIS — R68.89 DECREASED FUNCTIONAL ACTIVITY TOLERANCE: ICD-10-CM

## 2025-08-26 PROCEDURE — 97110 THERAPEUTIC EXERCISES: CPT

## 2025-08-26 PROCEDURE — 97112 NEUROMUSCULAR REEDUCATION: CPT

## 2025-08-28 ENCOUNTER — CLINICAL SUPPORT (OUTPATIENT)
Dept: REHABILITATION | Facility: OTHER | Age: 45
End: 2025-08-28
Payer: OTHER GOVERNMENT

## 2025-08-28 DIAGNOSIS — R68.89 DECREASED FUNCTIONAL ACTIVITY TOLERANCE: ICD-10-CM

## 2025-08-28 DIAGNOSIS — R29.898 DECREASED STRENGTH OF TRUNK AND BACK: Primary | ICD-10-CM

## 2025-08-28 PROCEDURE — 97110 THERAPEUTIC EXERCISES: CPT | Mod: CQ

## 2025-08-28 PROCEDURE — 97112 NEUROMUSCULAR REEDUCATION: CPT | Mod: CQ

## 2025-08-30 DIAGNOSIS — G47.00 INSOMNIA, UNSPECIFIED TYPE: ICD-10-CM

## 2025-08-30 DIAGNOSIS — F41.1 GENERALIZED ANXIETY DISORDER: ICD-10-CM

## 2025-09-02 RX ORDER — CLONAZEPAM 1 MG/1
1 TABLET ORAL
Qty: 30 TABLET | Refills: 2 | Status: SHIPPED | OUTPATIENT
Start: 2025-09-02

## 2025-09-03 ENCOUNTER — CLINICAL SUPPORT (OUTPATIENT)
Dept: REHABILITATION | Facility: OTHER | Age: 45
End: 2025-09-03
Payer: OTHER GOVERNMENT

## 2025-09-03 DIAGNOSIS — R29.898 DECREASED STRENGTH OF TRUNK AND BACK: Primary | ICD-10-CM

## 2025-09-03 DIAGNOSIS — R68.89 DECREASED FUNCTIONAL ACTIVITY TOLERANCE: ICD-10-CM

## 2025-09-03 PROCEDURE — 97112 NEUROMUSCULAR REEDUCATION: CPT | Mod: CQ

## 2025-09-03 PROCEDURE — 97110 THERAPEUTIC EXERCISES: CPT | Mod: CQ

## (undated) DEVICE — SUT VICRYL+ 27 UR-6 VIOL

## (undated) DEVICE — PORT ACCESS 5MM W/120MM

## (undated) DEVICE — KIT WING PAD POSITIONING

## (undated) DEVICE — SOL POVIDONE PREP IODINE 4 OZ

## (undated) DEVICE — TOWEL OR DISP STRL BLUE 4/PK

## (undated) DEVICE — GLOVE SENSICARE PI GRN 7

## (undated) DEVICE — BLADE SURG #15 CARBON STEEL

## (undated) DEVICE — KIT ENDO CARPEL TUNNAL SINGLE

## (undated) DEVICE — SYS SEE SHARP SCP ANTIFG LNG

## (undated) DEVICE — APPLICATOR ARISTA FLEX XL

## (undated) DEVICE — SOL STRL WATER INJ 1000ML BG

## (undated) DEVICE — BANDAGE MATRIX HK LOOP 2IN 5YD

## (undated) DEVICE — SUT VICRYL PLUS 0 CT1 36IN

## (undated) DEVICE — SOL NORMAL USPCA 0.9%

## (undated) DEVICE — GLOVE SENSICARE PI SURG 6.5

## (undated) DEVICE — SOL POVIDONE SCRUB IODINE 4 OZ

## (undated) DEVICE — GLOVE BIOGEL PI MICRO SZ 7.5

## (undated) DEVICE — SUT 4-0 ETHILON 18 PS-2

## (undated) DEVICE — TROCAR KII FIOS 11MM X 100MM

## (undated) DEVICE — UNDERGLOVES BIOGEL PI SIZE 8

## (undated) DEVICE — TROCAR KII FIOS 5MM X 100MM

## (undated) DEVICE — ENDOSTITCH POLYSORB 0 ES-9

## (undated) DEVICE — SLING ARM LARGE FOAM STRAP

## (undated) DEVICE — SEALER LIGASURE LAP 37CM 5MM

## (undated) DEVICE — DRAPE STERI LONG

## (undated) DEVICE — SOL IRR SOD CHL .9% POUR

## (undated) DEVICE — SYR 10CC LUER LOCK

## (undated) DEVICE — IRRIGATOR ENDOSCOPY DISP.

## (undated) DEVICE — PAD CAST 2 IN X 4YDS STERILE

## (undated) DEVICE — COVER CAMERA OPERATING ROOM

## (undated) DEVICE — KIT ANTIFOG W/SPONG & FLUID

## (undated) DEVICE — SUT 0 VICRYL PLUS CT-1 27IN

## (undated) DEVICE — SUT VICRYL 4-0 RB1 27IN UD

## (undated) DEVICE — PACK LAPAROSCOPY BAPTIST

## (undated) DEVICE — POWDER ARISTA AH 3G

## (undated) DEVICE — ENDOSTITCH INSTRUMENT

## (undated) DEVICE — SUT MCRYL PLUS 4-0 PS2 27IN

## (undated) DEVICE — GOWN NONREINF SET-IN SLV XL

## (undated) DEVICE — SPONGE COTTON TRAY 4X4IN

## (undated) DEVICE — Device

## (undated) DEVICE — SYR 50ML CATH TIP

## (undated) DEVICE — GOWN ECLIPSE REINF LVL4 TWL XL

## (undated) DEVICE — TOURNIQUET SB QC DP 18X4IN

## (undated) DEVICE — DRAPE STERI-DRAPE 1000 17X11IN

## (undated) DEVICE — TIP RUMI KOH-EFFICIENT 2.5

## (undated) DEVICE — DRESSING XEROFORM NONADH 1X8IN

## (undated) DEVICE — SCISSOR 5MMX35CM DIRECT DRIVE

## (undated) DEVICE — SET TRI-LUMEN FILTERED TUBE

## (undated) DEVICE — SET IRR CYSTO 4 LEAD 90IN

## (undated) DEVICE — ELECTRODE REM PLYHSV RETURN 9

## (undated) DEVICE — SUT EASE CROSSBOW CLSR SYS

## (undated) DEVICE — TROCAR ENDOPATH XCEL 12MM 15CM

## (undated) DEVICE — TIP RUMI KOH-EFFICIENT 3.0

## (undated) DEVICE — NDL INSUFFLATION VERRES 120MM